# Patient Record
Sex: FEMALE | Race: WHITE | NOT HISPANIC OR LATINO | Employment: OTHER | URBAN - METROPOLITAN AREA
[De-identification: names, ages, dates, MRNs, and addresses within clinical notes are randomized per-mention and may not be internally consistent; named-entity substitution may affect disease eponyms.]

---

## 2017-05-10 RX ORDER — METOPROLOL SUCCINATE 100 MG/1
100 TABLET, EXTENDED RELEASE ORAL DAILY
Qty: 30 TABLET | Refills: 2 | Status: SHIPPED | OUTPATIENT
Start: 2017-05-10 | End: 2019-03-04

## 2019-03-04 ENCOUNTER — HOSPITAL ENCOUNTER (INPATIENT)
Facility: HOSPITAL | Age: 64
LOS: 16 days | Discharge: LEFT BEFORE DISCHARGE | End: 2019-03-20
Attending: EMERGENCY MEDICINE | Admitting: SURGERY

## 2019-03-04 ENCOUNTER — APPOINTMENT (OUTPATIENT)
Dept: MRI IMAGING | Facility: HOSPITAL | Age: 64
End: 2019-03-04

## 2019-03-04 DIAGNOSIS — D32.9 MENINGIOMA (HCC): ICD-10-CM

## 2019-03-04 DIAGNOSIS — E21.3 HYPERPARATHYROIDISM (HCC): ICD-10-CM

## 2019-03-04 DIAGNOSIS — E83.51 HYPOCALCEMIA: ICD-10-CM

## 2019-03-04 DIAGNOSIS — G93.6 CEREBRAL EDEMA (HCC): Primary | ICD-10-CM

## 2019-03-04 DIAGNOSIS — I67.1 CEREBROVASCULAR DURAL AV FISTULA: ICD-10-CM

## 2019-03-04 DIAGNOSIS — R26.2 DIFFICULTY WALKING: ICD-10-CM

## 2019-03-04 DIAGNOSIS — I77.0 AVF (ARTERIOVENOUS FISTULA) (HCC): ICD-10-CM

## 2019-03-04 DIAGNOSIS — E83.52 HYPERCALCEMIA: ICD-10-CM

## 2019-03-04 DIAGNOSIS — H49.22 LATERAL RECTUS PALSY, LEFT: ICD-10-CM

## 2019-03-04 PROBLEM — H53.2 DIPLOPIA: Status: ACTIVE | Noted: 2019-03-04

## 2019-03-04 LAB
ABO GROUP BLD: NORMAL
ANION GAP SERPL CALCULATED.3IONS-SCNC: 12.3 MMOL/L
BASOPHILS # BLD AUTO: 0.02 10*3/MM3 (ref 0–0.2)
BASOPHILS NFR BLD AUTO: 0.2 % (ref 0–1.5)
BLD GP AB SCN SERPL QL: NEGATIVE
BUN BLD-MCNC: 9 MG/DL (ref 8–23)
BUN/CREAT SERPL: 11.4 (ref 7–25)
CALCIUM SPEC-SCNC: 12.3 MG/DL (ref 8.6–10.5)
CHLORIDE SERPL-SCNC: 106 MMOL/L (ref 98–107)
CO2 SERPL-SCNC: 24.7 MMOL/L (ref 22–29)
CREAT BLD-MCNC: 0.79 MG/DL (ref 0.57–1)
CRP SERPL-MCNC: 0.06 MG/DL (ref 0–0.5)
DEPRECATED RDW RBC AUTO: 44.1 FL (ref 37–54)
EOSINOPHIL # BLD AUTO: 0.07 10*3/MM3 (ref 0–0.4)
EOSINOPHIL NFR BLD AUTO: 0.7 % (ref 0.3–6.2)
ERYTHROCYTE [DISTWIDTH] IN BLOOD BY AUTOMATED COUNT: 14 % (ref 12.3–15.4)
ERYTHROCYTE [SEDIMENTATION RATE] IN BLOOD: 41 MM/HR (ref 0–30)
GFR SERPL CREATININE-BSD FRML MDRD: 73 ML/MIN/1.73
GLUCOSE BLD-MCNC: 104 MG/DL (ref 65–99)
HCT VFR BLD AUTO: 45.9 % (ref 34–46.6)
HGB BLD-MCNC: 15.3 G/DL (ref 12–15.9)
HOLD SPECIMEN: NORMAL
HOLD SPECIMEN: NORMAL
IMM GRANULOCYTES # BLD AUTO: 0.06 10*3/MM3 (ref 0–0.05)
IMM GRANULOCYTES NFR BLD AUTO: 0.6 % (ref 0–0.5)
LYMPHOCYTES # BLD AUTO: 1.68 10*3/MM3 (ref 0.7–3.1)
LYMPHOCYTES NFR BLD AUTO: 17.4 % (ref 19.6–45.3)
MCH RBC QN AUTO: 29.2 PG (ref 26.6–33)
MCHC RBC AUTO-ENTMCNC: 33.3 G/DL (ref 31.5–35.7)
MCV RBC AUTO: 87.6 FL (ref 79–97)
MONOCYTES # BLD AUTO: 0.52 10*3/MM3 (ref 0.1–0.9)
MONOCYTES NFR BLD AUTO: 5.4 % (ref 5–12)
NEUTROPHILS # BLD AUTO: 7.28 10*3/MM3 (ref 1.4–7)
NEUTROPHILS NFR BLD AUTO: 75.7 % (ref 42.7–76)
NRBC BLD AUTO-RTO: 0 /100 WBC (ref 0–0)
PLATELET # BLD AUTO: 208 10*3/MM3 (ref 140–450)
PMV BLD AUTO: 9.8 FL (ref 6–12)
POTASSIUM BLD-SCNC: 4 MMOL/L (ref 3.5–5.2)
RBC # BLD AUTO: 5.24 10*6/MM3 (ref 3.77–5.28)
RH BLD: POSITIVE
SODIUM BLD-SCNC: 143 MMOL/L (ref 136–145)
T&S EXPIRATION DATE: NORMAL
T4 FREE SERPL-MCNC: 1.36 NG/DL (ref 0.93–1.7)
TSH SERPL DL<=0.05 MIU/L-ACNC: 1.31 MIU/ML (ref 0.27–4.2)
WBC NRBC COR # BLD: 9.63 10*3/MM3 (ref 3.4–10.8)
WHOLE BLOOD HOLD SPECIMEN: NORMAL
WHOLE BLOOD HOLD SPECIMEN: NORMAL

## 2019-03-04 PROCEDURE — 25010000002 LORAZEPAM PER 2 MG: Performed by: EMERGENCY MEDICINE

## 2019-03-04 PROCEDURE — 85025 COMPLETE CBC W/AUTO DIFF WBC: CPT | Performed by: EMERGENCY MEDICINE

## 2019-03-04 PROCEDURE — 25010000002 DEXAMETHASONE PER 1 MG: Performed by: NURSE PRACTITIONER

## 2019-03-04 PROCEDURE — 80048 BASIC METABOLIC PNL TOTAL CA: CPT | Performed by: EMERGENCY MEDICINE

## 2019-03-04 PROCEDURE — 86901 BLOOD TYPING SEROLOGIC RH(D): CPT | Performed by: EMERGENCY MEDICINE

## 2019-03-04 PROCEDURE — 25010000002 DEXAMETHASONE SODIUM PHOSPHATE 10 MG/ML SOLUTION 1 ML VIAL: Performed by: NURSE PRACTITIONER

## 2019-03-04 PROCEDURE — 70544 MR ANGIOGRAPHY HEAD W/O DYE: CPT

## 2019-03-04 PROCEDURE — 86900 BLOOD TYPING SEROLOGIC ABO: CPT | Performed by: EMERGENCY MEDICINE

## 2019-03-04 PROCEDURE — 25010000002 HYDROMORPHONE PER 4 MG: Performed by: EMERGENCY MEDICINE

## 2019-03-04 PROCEDURE — 99285 EMERGENCY DEPT VISIT HI MDM: CPT

## 2019-03-04 PROCEDURE — A9577 INJ MULTIHANCE: HCPCS | Performed by: EMERGENCY MEDICINE

## 2019-03-04 PROCEDURE — 99222 1ST HOSP IP/OBS MODERATE 55: CPT | Performed by: NURSE PRACTITIONER

## 2019-03-04 PROCEDURE — 84439 ASSAY OF FREE THYROXINE: CPT | Performed by: EMERGENCY MEDICINE

## 2019-03-04 PROCEDURE — 84443 ASSAY THYROID STIM HORMONE: CPT | Performed by: EMERGENCY MEDICINE

## 2019-03-04 PROCEDURE — 70549 MR ANGIOGRAPH NECK W/O&W/DYE: CPT

## 2019-03-04 PROCEDURE — 0 GADOBENATE DIMEGLUMINE 529 MG/ML SOLUTION: Performed by: EMERGENCY MEDICINE

## 2019-03-04 PROCEDURE — 86850 RBC ANTIBODY SCREEN: CPT | Performed by: EMERGENCY MEDICINE

## 2019-03-04 PROCEDURE — 85652 RBC SED RATE AUTOMATED: CPT | Performed by: EMERGENCY MEDICINE

## 2019-03-04 PROCEDURE — 70553 MRI BRAIN STEM W/O & W/DYE: CPT

## 2019-03-04 PROCEDURE — 86140 C-REACTIVE PROTEIN: CPT | Performed by: EMERGENCY MEDICINE

## 2019-03-04 RX ORDER — HYDROMORPHONE HYDROCHLORIDE 1 MG/ML
0.5 INJECTION, SOLUTION INTRAMUSCULAR; INTRAVENOUS; SUBCUTANEOUS
Status: DISCONTINUED | OUTPATIENT
Start: 2019-03-04 | End: 2019-03-14 | Stop reason: SDUPTHER

## 2019-03-04 RX ORDER — SODIUM CHLORIDE 0.9 % (FLUSH) 0.9 %
3 SYRINGE (ML) INJECTION EVERY 12 HOURS SCHEDULED
Status: DISCONTINUED | OUTPATIENT
Start: 2019-03-04 | End: 2019-03-20 | Stop reason: HOSPADM

## 2019-03-04 RX ORDER — DEXAMETHASONE SODIUM PHOSPHATE 4 MG/ML
4 INJECTION, SOLUTION INTRA-ARTICULAR; INTRALESIONAL; INTRAMUSCULAR; INTRAVENOUS; SOFT TISSUE EVERY 6 HOURS
Status: DISCONTINUED | OUTPATIENT
Start: 2019-03-04 | End: 2019-03-12

## 2019-03-04 RX ORDER — LORAZEPAM 2 MG/ML
1 INJECTION INTRAMUSCULAR ONCE
Status: COMPLETED | OUTPATIENT
Start: 2019-03-04 | End: 2019-03-04

## 2019-03-04 RX ORDER — HYDROMORPHONE HYDROCHLORIDE 1 MG/ML
0.5 INJECTION, SOLUTION INTRAMUSCULAR; INTRAVENOUS; SUBCUTANEOUS ONCE
Status: COMPLETED | OUTPATIENT
Start: 2019-03-04 | End: 2019-03-04

## 2019-03-04 RX ORDER — HYDROCODONE BITARTRATE AND ACETAMINOPHEN 7.5; 325 MG/1; MG/1
2 TABLET ORAL EVERY 4 HOURS PRN
Status: DISCONTINUED | OUTPATIENT
Start: 2019-03-04 | End: 2019-03-05

## 2019-03-04 RX ORDER — SODIUM CHLORIDE 0.9 % (FLUSH) 0.9 %
10 SYRINGE (ML) INJECTION AS NEEDED
Status: DISCONTINUED | OUTPATIENT
Start: 2019-03-04 | End: 2019-03-20 | Stop reason: HOSPADM

## 2019-03-04 RX ORDER — OMEPRAZOLE 20 MG/1
20 CAPSULE, DELAYED RELEASE ORAL AS NEEDED
COMMUNITY
End: 2019-03-20 | Stop reason: HOSPADM

## 2019-03-04 RX ORDER — SODIUM CHLORIDE 0.9 % (FLUSH) 0.9 %
3-10 SYRINGE (ML) INJECTION AS NEEDED
Status: DISCONTINUED | OUTPATIENT
Start: 2019-03-04 | End: 2019-03-20 | Stop reason: HOSPADM

## 2019-03-04 RX ORDER — HYDROCHLOROTHIAZIDE 25 MG/1
25 TABLET ORAL DAILY
Status: DISCONTINUED | OUTPATIENT
Start: 2019-03-04 | End: 2019-03-05

## 2019-03-04 RX ORDER — NITROGLYCERIN 0.4 MG/1
0.4 TABLET SUBLINGUAL
Status: DISCONTINUED | OUTPATIENT
Start: 2019-03-04 | End: 2019-03-20 | Stop reason: HOSPADM

## 2019-03-04 RX ORDER — FAMOTIDINE 20 MG/1
20 TABLET, FILM COATED ORAL 2 TIMES DAILY
Status: DISCONTINUED | OUTPATIENT
Start: 2019-03-04 | End: 2019-03-18

## 2019-03-04 RX ORDER — ACETAMINOPHEN 325 MG/1
650 TABLET ORAL EVERY 4 HOURS PRN
Status: DISCONTINUED | OUTPATIENT
Start: 2019-03-04 | End: 2019-03-14 | Stop reason: SDUPTHER

## 2019-03-04 RX ORDER — MELATONIN
3000 DAILY
COMMUNITY
End: 2019-03-20 | Stop reason: HOSPADM

## 2019-03-04 RX ORDER — SODIUM CHLORIDE 0.9 % (FLUSH) 0.9 %
10 SYRINGE (ML) INJECTION AS NEEDED
Status: DISCONTINUED | OUTPATIENT
Start: 2019-03-04 | End: 2019-03-04

## 2019-03-04 RX ORDER — IBUPROFEN 200 MG
400 TABLET ORAL EVERY 6 HOURS PRN
COMMUNITY
End: 2019-04-09

## 2019-03-04 RX ORDER — NALOXONE HCL 0.4 MG/ML
0.4 VIAL (ML) INJECTION
Status: DISCONTINUED | OUTPATIENT
Start: 2019-03-04 | End: 2019-03-14 | Stop reason: SDUPTHER

## 2019-03-04 RX ORDER — ONDANSETRON 2 MG/ML
4 INJECTION INTRAMUSCULAR; INTRAVENOUS EVERY 6 HOURS PRN
Status: DISCONTINUED | OUTPATIENT
Start: 2019-03-04 | End: 2019-03-20 | Stop reason: HOSPADM

## 2019-03-04 RX ADMIN — FAMOTIDINE 20 MG: 20 TABLET, FILM COATED ORAL at 20:46

## 2019-03-04 RX ADMIN — LORAZEPAM 1 MG: 2 INJECTION INTRAMUSCULAR; INTRAVENOUS at 13:29

## 2019-03-04 RX ADMIN — DEXAMETHASONE SODIUM PHOSPHATE 10 MG: 10 INJECTION INTRAMUSCULAR; INTRAVENOUS at 17:29

## 2019-03-04 RX ADMIN — HYDROMORPHONE HYDROCHLORIDE 0.5 MG: 1 INJECTION, SOLUTION INTRAMUSCULAR; INTRAVENOUS; SUBCUTANEOUS at 16:48

## 2019-03-04 RX ADMIN — SODIUM CHLORIDE, PRESERVATIVE FREE 3 ML: 5 INJECTION INTRAVENOUS at 23:19

## 2019-03-04 RX ADMIN — DEXAMETHASONE SODIUM PHOSPHATE 4 MG: 4 INJECTION, SOLUTION INTRAMUSCULAR; INTRAVENOUS at 23:39

## 2019-03-04 RX ADMIN — HYDROCHLOROTHIAZIDE 25 MG: 25 TABLET ORAL at 13:20

## 2019-03-04 RX ADMIN — GADOBENATE DIMEGLUMINE 20 ML: 529 INJECTION, SOLUTION INTRAVENOUS at 14:39

## 2019-03-04 NOTE — ED NOTES
"Pt complains of \"I started having double vision about a week or so ago, and I thought it would get better, but then on Saturday I noticed when I was looking up it was really painful behind my eyes.\"      Kerrie Branch, RN  03/04/19 0599    "

## 2019-03-04 NOTE — ED PROVIDER NOTES
EMERGENCY DEPARTMENT ENCOUNTER    Room Number:  38/38  Date seen:  3/4/2019  Time seen: 12:06 PM  PCP: Juarez Winters MD  Historian: Pt      HPI:  Chief Complaint: Diplopia  A complete HPI/ROS/PMH/PSH/SH/FH are unobtainable due to: Nothing  Context: Joan Almanza is a 64 y.o. female who presents to the ED c/o diplopia when both of her eyes open, which began last night. The pt states that symptoms began in her L eye, and it is easier to see objects up close. The pt mild headache 2 days ago and denies fever, neck pain. The pt states that the pt had some vision changes last year (saw a line in her vision), but the symptoms resolved on their own. The pt has not seen her PCP in 3 years. The pt states that her BP is normally 130s.     Pain Location: Bilateral eyes  Radiation: None  Quality: Double vision  Intensity/Severity: Moderate  Duration: 1 day  Onset quality: Gradual  Timing: Constant  Progression: No change  Aggravating Factors: Unknown  Alleviating Factors: Unknown  Previous Episodes: The pt saw a line in her vision 1 year ago and the symptoms resolved on their own.   Treatment before arrival: None  Associated Symptoms: Headache    PAST MEDICAL HISTORY  Active Ambulatory Problems     Diagnosis Date Noted   • No Active Ambulatory Problems     Resolved Ambulatory Problems     Diagnosis Date Noted   • No Resolved Ambulatory Problems     Past Medical History:   Diagnosis Date   • Tachycardia          PAST SURGICAL HISTORY  Past Surgical History:   Procedure Laterality Date   • ADENOIDECTOMY     • CHOLECYSTECTOMY     • OVARY SURGERY Right    • TONSILLECTOMY           FAMILY HISTORY  History reviewed. No pertinent family history.      SOCIAL HISTORY  Social History     Socioeconomic History   • Marital status:      Spouse name: Not on file   • Number of children: Not on file   • Years of education: Not on file   • Highest education level: Not on file   Social Needs   • Financial resource strain: Not on  file   • Food insecurity - worry: Not on file   • Food insecurity - inability: Not on file   • Transportation needs - medical: Not on file   • Transportation needs - non-medical: Not on file   Occupational History   • Not on file   Tobacco Use   • Smoking status: Never Smoker   Substance and Sexual Activity   • Alcohol use: No     Frequency: Never   • Drug use: No   • Sexual activity: Defer   Other Topics Concern   • Not on file   Social History Narrative   • Not on file         ALLERGIES  Patient has no known allergies.        REVIEW OF SYSTEMS  Review of Systems   Constitutional: Negative for fever.   HENT: Negative for sore throat.    Eyes: Positive for visual disturbance (Diplopia).   Respiratory: Negative for shortness of breath.    Cardiovascular: Negative for chest pain.   Gastrointestinal: Negative for abdominal pain.   Endocrine: Negative for polyuria.   Genitourinary: Negative for dysuria.   Musculoskeletal: Negative for neck pain.   Skin: Negative for rash.   Neurological: Positive for headaches.   All other systems reviewed and are negative.           PHYSICAL EXAM  ED Triage Vitals   Temp Heart Rate Resp BP SpO2   03/04/19 1050 03/04/19 1050 03/04/19 1100 03/04/19 1050 03/04/19 1050   98.4 °F (36.9 °C) 106 16 (!) 194/115 98 %      Temp src Heart Rate Source Patient Position BP Location FiO2 (%)   03/04/19 1050 03/04/19 1050 03/04/19 1151 -- --   Tympanic Monitor Lying           GENERAL: not distressed  HENT: nares patent  EYES: no scleral icterus, PERRL, pupils 5mm, lateral rectus palsy in L eye, L eye 20/20 -1, 20/20 in R eye  CV: regular rhythm, regular rate  RESPIRATORY: normal effort  MUSCULOSKELETAL: no deformity  NEURO: alert, HERNANDEZ, FC  SKIN: warm, dry    Vital signs and nursing notes reviewed.          LAB RESULTS  Recent Results (from the past 24 hour(s))   Light Blue Top    Collection Time: 03/04/19 11:51 AM   Result Value Ref Range    Extra Tube hold for add-on    Green Top (Gel)    Collection  Time: 03/04/19 11:51 AM   Result Value Ref Range    Extra Tube Hold for add-ons.    Lavender Top    Collection Time: 03/04/19 11:51 AM   Result Value Ref Range    Extra Tube hold for add-on    Gold Top - SST    Collection Time: 03/04/19 11:51 AM   Result Value Ref Range    Extra Tube Hold for add-ons.    Basic Metabolic Panel    Collection Time: 03/04/19 11:51 AM   Result Value Ref Range    Glucose 104 (H) 65 - 99 mg/dL    BUN 9 8 - 23 mg/dL    Creatinine 0.79 0.57 - 1.00 mg/dL    Sodium 143 136 - 145 mmol/L    Potassium 4.0 3.5 - 5.2 mmol/L    Chloride 106 98 - 107 mmol/L    CO2 24.7 22.0 - 29.0 mmol/L    Calcium 12.3 (H) 8.6 - 10.5 mg/dL    eGFR Non African Amer 73 >60 mL/min/1.73    BUN/Creatinine Ratio 11.4 7.0 - 25.0    Anion Gap 12.3 mmol/L   Sedimentation Rate    Collection Time: 03/04/19 11:51 AM   Result Value Ref Range    Sed Rate 41 (H) 0 - 30 mm/hr   C-reactive Protein    Collection Time: 03/04/19 11:51 AM   Result Value Ref Range    C-Reactive Protein 0.06 0.00 - 0.50 mg/dL   TSH    Collection Time: 03/04/19 11:51 AM   Result Value Ref Range    TSH 1.310 0.270 - 4.200 mIU/mL   T4, Free    Collection Time: 03/04/19 11:51 AM   Result Value Ref Range    Free T4 1.36 0.93 - 1.70 ng/dL   CBC Auto Differential    Collection Time: 03/04/19 11:51 AM   Result Value Ref Range    WBC 9.63 3.40 - 10.80 10*3/mm3    RBC 5.24 3.77 - 5.28 10*6/mm3    Hemoglobin 15.3 12.0 - 15.9 g/dL    Hematocrit 45.9 34.0 - 46.6 %    MCV 87.6 79.0 - 97.0 fL    MCH 29.2 26.6 - 33.0 pg    MCHC 33.3 31.5 - 35.7 g/dL    RDW 14.0 12.3 - 15.4 %    RDW-SD 44.1 37.0 - 54.0 fl    MPV 9.8 6.0 - 12.0 fL    Platelets 208 140 - 450 10*3/mm3    Neutrophil % 75.7 42.7 - 76.0 %    Lymphocyte % 17.4 (L) 19.6 - 45.3 %    Monocyte % 5.4 5.0 - 12.0 %    Eosinophil % 0.7 0.3 - 6.2 %    Basophil % 0.2 0.0 - 1.5 %    Immature Grans % 0.6 (H) 0.0 - 0.5 %    Neutrophils, Absolute 7.28 (H) 1.40 - 7.00 10*3/mm3    Lymphocytes, Absolute 1.68 0.70 - 3.10 10*3/mm3     Monocytes, Absolute 0.52 0.10 - 0.90 10*3/mm3    Eosinophils, Absolute 0.07 0.00 - 0.40 10*3/mm3    Basophils, Absolute 0.02 0.00 - 0.20 10*3/mm3    Immature Grans, Absolute 0.06 (H) 0.00 - 0.05 10*3/mm3    nRBC 0.0 0.0 - 0.0 /100 WBC       Ordered the above labs and reviewed the results.        RADIOLOGY  MRI Angiogram Head Without Contrast   Preliminary Result       There are findings most compatible with a meningioma along the medial   aspect of the left middle cranial fossa which potentially invades the   adjacent cavernous sinus measuring up to approximately 2.2 x 1.6 x 2.8   cm in greatest dimensions. There is some vasogenic edema within the   anterior left temporal lobe. Additionally, there are findings compatible   with a second, much smaller meningioma along the superior aspect of the   right superior parietal lobule measuring up to 6 mm in diameter that   does not result in any significant adjacent mass effect.       There are findings most compatible with a dural AV fistula along the   lateral aspect of the right temporal occipital junction with prominent   venous vasculature seen along the lateral aspect of the right temporal   and occipital lobes.       MRA of the head was obtained with 3D time-of-flight imaging technique.   Maximum intensity projection reconstructed images were obtained.        FINDINGS:         There are findings most compatible with a dural AV fistula along the   lateral aspect of the right temporal occipital junction. There is a   hypertrophied posterior division of the right middle meningeal artery.   Along its distal margins, there is a tuft of abnormal vasculature with   findings consistent with arterialized cortical draining veins. I suspect   that the fistula drains directly into a cortical vein. Some of these   veins are dilated and ultimately, the findings are suggestive of at   least a type III is not a type IV Djindjian-Merland classification   scheme fistula.   Additionally, there is some diminished flow related   enhancement within the proximal aspect of the posterior division of the   right middle meningeal artery along with potentially some diminished   flow related enhancement along the distal margins of the middle   meningeal artery potentially representing flow-related stenoses.       The right internal carotid artery, both middle cerebral arteries, both   anterior cerebral arteries, the vertebral arteries, the basilar artery,   and the posterior cerebral arteries are all unremarkable in appearance.   There is some atherosclerotic irregularity within the cavernous segment   of the left ICA.        IMPRESSION:       There are findings most compatible with at least a type III if not a   type IV Djindjian-Merland classification scheme dural AV fistula. Again,   the arterial supply appears to be predominantly via posterior division   of the right middle meningeal artery and this fistula appears to be   directly into a cortical vein with arterialized draining cortical veins   noted along the lateral aspect of the right temporal and occipital   lobes. Furthermore, some of these veins appear dilated suggesting varix   formation. Again, there may be a flow related stenosis within the   proximal and distal aspect of the feeding posterior division of right   middle meningeal artery.       Otherwise, mild atherosclerotic irregularity is noted within the left   cavernous ICA.       MRA of the neck was obtained with 3D time-of-flight imaging technique.   Additionally, there is a contrast enhanced MRA of the neck.       FINDINGS:        There is a classic configuration of the aortic arch. There is no NASCET   stenosis within either common carotid artery bifurcation or proximal   internal carotid. The vertebral arteries are unremarkable. The left   vertebral artery is dominant. Incidental note is made of prominent flow   within the right sigmoid sinus and internal jugular vein from  the   patient's apparent dural AV fistula.       IMPRESSION:        No significant NASCET stenosis within either common carotid artery   bifurcation or proximal internal carotid. Prominent flow is noted within   the right sigmoid sinus and internal jugular vein from the patient's   apparent dural AV fistula.       These findings were discussed with Bull Branch MD on 03/04/2019.           MRI Angiogram Neck With & Without Contrast   Preliminary Result       There are findings most compatible with a meningioma along the medial   aspect of the left middle cranial fossa which potentially invades the   adjacent cavernous sinus measuring up to approximately 2.2 x 1.6 x 2.8   cm in greatest dimensions. There is some vasogenic edema within the   anterior left temporal lobe. Additionally, there are findings compatible   with a second, much smaller meningioma along the superior aspect of the   right superior parietal lobule measuring up to 6 mm in diameter that   does not result in any significant adjacent mass effect.       There are findings most compatible with a dural AV fistula along the   lateral aspect of the right temporal occipital junction with prominent   venous vasculature seen along the lateral aspect of the right temporal   and occipital lobes.       MRA of the head was obtained with 3D time-of-flight imaging technique.   Maximum intensity projection reconstructed images were obtained.        FINDINGS:         There are findings most compatible with a dural AV fistula along the   lateral aspect of the right temporal occipital junction. There is a   hypertrophied posterior division of the right middle meningeal artery.   Along its distal margins, there is a tuft of abnormal vasculature with   findings consistent with arterialized cortical draining veins. I suspect   that the fistula drains directly into a cortical vein. Some of these   veins are dilated and ultimately, the findings are suggestive of at   least  a type III is not a type IV Djindjian-Merland classification   scheme fistula.  Additionally, there is some diminished flow related   enhancement within the proximal aspect of the posterior division of the   right middle meningeal artery along with potentially some diminished   flow related enhancement along the distal margins of the middle   meningeal artery potentially representing flow-related stenoses.       The right internal carotid artery, both middle cerebral arteries, both   anterior cerebral arteries, the vertebral arteries, the basilar artery,   and the posterior cerebral arteries are all unremarkable in appearance.   There is some atherosclerotic irregularity within the cavernous segment   of the left ICA.        IMPRESSION:       There are findings most compatible with at least a type III if not a   type IV Djindjian-Merland classification scheme dural AV fistula. Again,   the arterial supply appears to be predominantly via posterior division   of the right middle meningeal artery and this fistula appears to be   directly into a cortical vein with arterialized draining cortical veins   noted along the lateral aspect of the right temporal and occipital   lobes. Furthermore, some of these veins appear dilated suggesting varix   formation. Again, there may be a flow related stenosis within the   proximal and distal aspect of the feeding posterior division of right   middle meningeal artery.       Otherwise, mild atherosclerotic irregularity is noted within the left   cavernous ICA.       MRA of the neck was obtained with 3D time-of-flight imaging technique.   Additionally, there is a contrast enhanced MRA of the neck.       FINDINGS:        There is a classic configuration of the aortic arch. There is no NASCET   stenosis within either common carotid artery bifurcation or proximal   internal carotid. The vertebral arteries are unremarkable. The left   vertebral artery is dominant. Incidental note is made  of prominent flow   within the right sigmoid sinus and internal jugular vein from the   patient's apparent dural AV fistula.       IMPRESSION:        No significant NASCET stenosis within either common carotid artery   bifurcation or proximal internal carotid. Prominent flow is noted within   the right sigmoid sinus and internal jugular vein from the patient's   apparent dural AV fistula.       These findings were discussed with Bull Branch MD on 03/04/2019.           MRI Brain With & Without Contrast   Preliminary Result       There are findings most compatible with a meningioma along the medial   aspect of the left middle cranial fossa which potentially invades the   adjacent cavernous sinus measuring up to approximately 2.2 x 1.6 x 2.8   cm in greatest dimensions. There is some vasogenic edema within the   anterior left temporal lobe. Additionally, there are findings compatible   with a second, much smaller meningioma along the superior aspect of the   right superior parietal lobule measuring up to 6 mm in diameter that   does not result in any significant adjacent mass effect.       There are findings most compatible with a dural AV fistula along the   lateral aspect of the right temporal occipital junction with prominent   venous vasculature seen along the lateral aspect of the right temporal   and occipital lobes.       MRA of the head was obtained with 3D time-of-flight imaging technique.   Maximum intensity projection reconstructed images were obtained.        FINDINGS:         There are findings most compatible with a dural AV fistula along the   lateral aspect of the right temporal occipital junction. There is a   hypertrophied posterior division of the right middle meningeal artery.   Along its distal margins, there is a tuft of abnormal vasculature with   findings consistent with arterialized cortical draining veins. I suspect   that the fistula drains directly into a cortical vein. Some of these    veins are dilated and ultimately, the findings are suggestive of at   least a type III is not a type IV Djindjian-Merland classification   scheme fistula.  Additionally, there is some diminished flow related   enhancement within the proximal aspect of the posterior division of the   right middle meningeal artery along with potentially some diminished   flow related enhancement along the distal margins of the middle   meningeal artery potentially representing flow-related stenoses.       The right internal carotid artery, both middle cerebral arteries, both   anterior cerebral arteries, the vertebral arteries, the basilar artery,   and the posterior cerebral arteries are all unremarkable in appearance.   There is some atherosclerotic irregularity within the cavernous segment   of the left ICA.        IMPRESSION:       There are findings most compatible with at least a type III if not a   type IV Djindjian-Merland classification scheme dural AV fistula. Again,   the arterial supply appears to be predominantly via posterior division   of the right middle meningeal artery and this fistula appears to be   directly into a cortical vein with arterialized draining cortical veins   noted along the lateral aspect of the right temporal and occipital   lobes. Furthermore, some of these veins appear dilated suggesting varix   formation. Again, there may be a flow related stenosis within the   proximal and distal aspect of the feeding posterior division of right   middle meningeal artery.       Otherwise, mild atherosclerotic irregularity is noted within the left   cavernous ICA.       MRA of the neck was obtained with 3D time-of-flight imaging technique.   Additionally, there is a contrast enhanced MRA of the neck.       FINDINGS:        There is a classic configuration of the aortic arch. There is no NASCET   stenosis within either common carotid artery bifurcation or proximal   internal carotid. The vertebral arteries are  unremarkable. The left   vertebral artery is dominant. Incidental note is made of prominent flow   within the right sigmoid sinus and internal jugular vein from the   patient's apparent dural AV fistula.       IMPRESSION:        No significant NASCET stenosis within either common carotid artery   bifurcation or proximal internal carotid. Prominent flow is noted within   the right sigmoid sinus and internal jugular vein from the patient's   apparent dural AV fistula.       These findings were discussed with Bull Branch MD on 03/04/2019.                  Ordered the above noted radiological studies. Reviewed by me in PACS.  Spoke with Dr. Baker (radiologist) regarding MRI scan results.          PROCEDURES  Procedures        MEDICATIONS GIVEN IN ER  Medications   sodium chloride 0.9 % flush 10 mL (not administered)   hydrochlorothiazide (HYDRODIURIL) tablet 25 mg (25 mg Oral Given 3/4/19 1320)   dexamethasone (DECADRON) 10 mg/20ml NS IV syringe (not administered)   dexamethasone (DECADRON) injection 4 mg (not administered)   LORazepam (ATIVAN) injection 1 mg (1 mg Intravenous Given 3/4/19 1329)   gadobenate dimeglumine (MULTIHANCE) injection 20 mL (20 mL Intravenous Given 3/4/19 1439)   HYDROmorphone (DILAUDID) injection 0.5 mg (0.5 mg Intravenous Given 3/4/19 1648)           PROGRESS AND CONSULTS  ED Course as of Mar 04 1727   Mon Mar 04, 2019   1233 Differential for her diplopia includes cranial nerve palsy such as intracranial aneurysm, stroke, giant cell arteritis, migraine.  Also included includes myasthenia gravis, Warnicke's, Alvarado Adrian variant, pseudotumor cerebri, thyroid ophthalmopathy.  [TD]      ED Course User Index  [TD] Bull Branch II, MD     1215 Discussed the plan to order an MRI due to the pt's vision changes. The pt states that she is claustrophobic, and I told her that I would order her medication for her symptoms. The pt agrees with the plan.     1234 Ordered sed rate, CRP, TSH, T4,  BMP, CBC, MRI angiogram neck, MRI brain, and MRI angiogram head for further evaluation. Ordered hydrodiuril for BP.     1235 Ordered ativan for claustrophobia.     1612 Placed call to neurosurgery.     1632 Discussed the pt with APRN with Dr. Bhakta (neuro) who will see the pt in the hospital.     1636 Rechecked the pt. The pt states that she has a headache, and states that she hears a heartbeat in her R ear. The pt has heard the sounds in her ear for months.  Discussed the pt's MRI and MRA results. Discussed that I will have neurosurgery see the pt to decide a further plan.     1707 Placed call to [facility] for admission.    1720 Discussed the pt with Dr. Simmons ([facility]) who agrees to admit the pt.     1720 Ordered inpatient admission.     MEDICAL DECISION MAKING      MDM  Number of Diagnoses or Management Options  Cerebrovascular dural AV fistula:   Lateral rectus palsy, left:   Meningioma (CMS/HCC):      Amount and/or Complexity of Data Reviewed  Clinical lab tests: ordered and reviewed (Sed rate: 41)  Tests in the radiology section of CPT®: ordered and reviewed (MRI/MRA: Meningioma, high grade dural AV fistula)  Discussion of test results with the performing providers: yes (Dr. Baker (radiology))  Decide to obtain previous medical records or to obtain history from someone other than the patient: yes  Review and summarize past medical records: yes  Discuss the patient with other providers: yes (Dr. Simmons ([facility]), APRN with Dr. Bhakta (neuro))    Patient Progress  Patient progress: stable             DIAGNOSIS  Final diagnoses:   Meningioma (CMS/HCC)   Lateral rectus palsy, left   Cerebrovascular dural AV fistula         DISPOSITION  ADMISSION    Discussed treatment plan and reason for admission with pt/family and admitting physician.  Pt/family voiced understanding of the plan for admission for further testing/treatment as needed.       Latest Documented Vital Signs:  As of 5:27 PM  BP- 168/100 HR- 58 Temp- 98.4 °F (36.9 °C)  (Tympanic) O2 sat- 97%        --  Documentation assistance provided by paul Siddiqui for Dr. Jose Carlos MD.  Information recorded by the scribe was done at my direction and has been verified and validated by me.     Nicki Siddiqui  03/04/19 2523       Bull Branch II, MD  03/04/19 3327

## 2019-03-04 NOTE — PROGRESS NOTES
Clinical Pharmacy Services: Medication History    Joan Almanza is a 64 y.o. female presenting to [facility] for   Chief Complaint   Patient presents with   • Headache   • Eye Pain   • Diplopia       She  has a past medical history of Morbid obesity (CMS/HCC), Ovarian cyst, and Tachycardia.    Allergies as of 03/04/2019   • (No Known Allergies)       Medication information was obtained from: Patient  Pharmacy and Phone Number: [facility] [phone]    Prior to Admission Medications     Prescriptions Last Dose Informant Patient Reported? Taking?    cholecalciferol (VITAMIN D3) 1000 units tablet  Self Yes Yes    Take 3,000 Units by mouth Daily.    ibuprofen (ADVIL,MOTRIN) 200 MG tablet  Self Yes Yes    Take 400 mg by mouth Every 6 (Six) Hours As Needed for Mild Pain .    omeprazole (priLOSEC) 20 MG capsule  Self Yes Yes    Take 20 mg by mouth As Needed.            Medication notes: Toprol removed. Prilosec, Ibuprofen, and Vitamin D added per patient.    This medication list is complete to the best of my knowledge as of 3/4/2019    Please call if questions.    Ivis Davis, Medication History Technician  3/4/2019 5:59 PM

## 2019-03-04 NOTE — PLAN OF CARE
Problem: Patient Care Overview  Goal: Plan of Care Review  Outcome: Ongoing (interventions implemented as appropriate)   03/04/19 8009   Coping/Psychosocial   Plan of Care Reviewed With patient   OTHER   Outcome Summary New admission     Goal: Individualization and Mutuality  Outcome: Ongoing (interventions implemented as appropriate)

## 2019-03-04 NOTE — CONSULTS
Patient name: Joan Almanza  Referring Provider: Dr. Branch  Reason for Consultation: AVF, meningioma    Patient Care Team:  Juarez Winters MD as PCP - General (Family Medicine)    Chief complaint: double vision    Subjective .     History of present illness:    Patient is a 64 y.o. right handed female who presents with 1 week of diplopia, horizontal and apparent in distant vision. It was initially mild but progressively more obvious and now constant. It is associated with frontal pressure HA that is worse with looking up. She also report pulsation sound in right ear that is most notable in the mornings and worsens with valsalva. She has intermittent episodes of light headedness. No swallow or speech issues. Left knee gave away once but no falls or other weakness, numbness, tingling. She report slow purposeful weight loss of 130 pounds over last 3 years.     Review of Systems  Review of Systems   Constitutional: Positive for appetite change (diminished). Negative for fever and unexpected weight change.   HENT: Negative for trouble swallowing.    Eyes: Positive for pain (with looking up) and visual disturbance. Negative for photophobia.   Respiratory: Negative for shortness of breath.    Cardiovascular: Negative for chest pain.   Gastrointestinal: Negative for nausea and vomiting.   Musculoskeletal: Negative for gait problem and neck pain.   Neurological: Positive for dizziness and headaches. Negative for facial asymmetry, speech difficulty, weakness and numbness.   Psychiatric/Behavioral: Negative for confusion.        Some  Word finding difficulties       History  PAST MEDICAL HISTORY  Past Medical History:   Diagnosis Date   • Morbid obesity (CMS/HCC)    • Ovarian cyst    • Tachycardia        PAST SURGICAL HISTORY  Past Surgical History:   Procedure Laterality Date   • ADENOIDECTOMY     • CHOLECYSTECTOMY     • OVARY SURGERY Right    • TONSILLECTOMY         FAMILY HISTORY  History reviewed. No pertinent family  history.    SOCIAL HISTORY  Social History     Tobacco Use   • Smoking status: Never Smoker   Substance Use Topics   • Alcohol use: No     Frequency: Never   • Drug use: No       unemployed  Lives with     Allergies:  Patient has no known allergies.    MEDICATIONS:    Current Facility-Administered Medications:   •  dexamethasone (DECADRON) injection 4 mg, 4 mg, Intravenous, Q6H, Elisabet Skinner APRN  •  famotidine (PEPCID) tablet 20 mg, 20 mg, Oral, BID, Elisabet Skinner APRN  •  hydrochlorothiazide (HYDRODIURIL) tablet 25 mg, 25 mg, Oral, Daily, Bull Branch II, MD, 25 mg at 03/04/19 1320  •  [COMPLETED] Insert peripheral IV, , , Once **AND** sodium chloride 0.9 % flush 10 mL, 10 mL, Intravenous, PRN, Bull Branch II, MD    Current Outpatient Medications:   •  metoprolol succinate XL (TOPROL-XL) 100 MG 24 hr tablet, Take 1 tablet by mouth Daily., Disp: 30 tablet, Rfl: 2      Objective     Results Review:  LABS:  Results from last 7 days   Lab Units 03/04/19  1151   WBC 10*3/mm3 9.63   HEMOGLOBIN g/dL 15.3   HEMATOCRIT % 45.9   PLATELETS 10*3/mm3 208     Results from last 7 days   Lab Units 03/04/19  1151   SODIUM mmol/L 143   POTASSIUM mmol/L 4.0   CHLORIDE mmol/L 106   CO2 mmol/L 24.7   BUN mg/dL 9   CREATININE mg/dL 0.79   CALCIUM mg/dL 12.3*   GLUCOSE mg/dL 104*       DIAGNOSTICS:  MRI/MRA- right temporal/occipital AVF with draining veins; left medial cranial fossa briskly enhancing mass with invasion of cavernous sinus; there is mild edema; unable to appreciate small right parietal mass    Results Review:   I reviewed the patient's new clinical results.  I personally viewed and interpreted the patient's MRI brain/MRA head- discuss with and reviewed by Dr. Bhakta    Vital Signs   Temp:  [98.4 °F (36.9 °C)] 98.4 °F (36.9 °C)  Heart Rate:  [] 58  Resp:  [16-18] 18  BP: (159-194)/(100-115) 168/100    Physical Exam:  Physical Exam   Constitutional: She is oriented to person,  place, and time. She appears well-developed.   Morbidly obese  Body mass index is 49.38 kg/m².     HENT:   Right postauricular bruit is soft but heard on auscultation   Eyes: EOM are normal. Pupils are equal, round, and reactive to light.   Neck: Neck supple. Normal carotid pulses present. Carotid bruit is not present.   Cardiovascular: Normal rate and regular rhythm.  Occasional extrasystoles are present.   Pulses:       Carotid pulses are 2+ on the right side, and 2+ on the left side.       Radial pulses are 2+ on the right side, and 2+ on the left side.        Dorsalis pedis pulses are 1+ on the right side, and 2+ on the left side.   Pulmonary/Chest: Effort normal and breath sounds normal.   Abdominal: Soft. Bowel sounds are normal. She exhibits no distension. There is no hepatosplenomegaly. There is no tenderness.   Musculoskeletal:        Cervical back: She exhibits normal range of motion.   Neurological: She is alert and oriented to person, place, and time. She has an abnormal Heel to Shin Test (left). She has a normal Finger-Nose-Finger Test.   Skin: Skin is warm and dry. Capillary refill takes less than 2 seconds.   Psychiatric: She has a normal mood and affect. Her speech is normal and behavior is normal. Judgment normal.     Neurologic Exam     Mental Status   Oriented to person, place, and time.   Registration: recalls 3 of 3 objects. Follows 3 step commands.   Attention: normal. Concentration: normal.   Speech: speech is normal   Level of consciousness: alert  Knowledge: good.   Able to name object. Able to repeat. Normal comprehension.     Cranial Nerves     CN II   Visual fields full to confrontation.     CN III, IV, VI   Pupils are equal, round, and reactive to light.  Extraocular motions are normal.   CN III: no CN III palsy  CN VI: no CN VI palsy  Nystagmus: none   Diplopia: horizontal and bilateral  Ophthalmoparesis: none  Conjugate gaze: present    CN V   Facial sensation intact.     CN VII    Facial expression full, symmetric.     CN VIII   CN VIII normal.     CN IX, X   CN IX normal.   CN X normal.     CN XI   CN XI normal.     CN XII   CN XII normal.     Motor Exam   Right arm pronator drift: absent  Left arm pronator drift: present (slight with LEG not arm)    Sensory Exam   Light touch normal.     Gait, Coordination, and Reflexes     Gait  Gait: (not tested for safety)    Coordination   Finger to nose coordination: normal  Heel to shin coordination: abnormal (left)    Reflexes   Right plantar: normal  Left plantar: normal  Right Stearns: absent  Left Stearns: absent      Assessment/Plan       Cerebral edema (CMS/HCC)    Meningioma (CMS/HCC)    AVF (arteriovenous fistula) (CMS/[facility])      PLAN: Patient presents secondary to diplopia.  Imaging findings reveal a left medial fossa lesion with invasion into the cavernous sinus.  This is likely the source of the diplopia.  She was also found to have an incidental right temporal occipital AV fistula.  No evidence of hemorrhage.  After further investigation she does admit to pulsatile tinnitus over the last several months that is aggravated by Valsalva.    Her exam is as noted above.  She reports diplopia, visual fields and other cranial nerves are intact otherwise.  She has a mild drift of the left lower extremity and ataxia on heel to shin, but upper extremity normal.  At this time, we will observe her overnight to see if some steroid will improve her visual complaints.  I have also added GI prophylaxis.  Most important factor is blood pressure control.  Recommend systolic pressure to be normotensive, but definitely less than 150.  We will leave this to the admitting physician.  Dr. Bhakta to follow-up and discuss potential treatment options with the patient tomorrow morning.  Patient will likely need to be discharged on prednisone due to cost prohibitive nature of dexamethasone without insurance.    I discussed the patients findings and my recommendations  with patient and family    Elisabet Skinner, APRN  03/04/19  5:41 PM    Dragon disclaimer:   Much of this encounter note is an electronic transcription/translation of spoken language to printed text. The electronic translation of spoken language may permit erroneous, or at times, nonsensical words or phrases to be inadvertently transcribed; Although I have reviewed the note for such errors, some may still exist.

## 2019-03-04 NOTE — PROGRESS NOTES
"Entered room and introduced self to pt/explained role. Verified information on facesheet is correct. Asked pt if she currently had a PCP. Pt responded that she does have a PCP, but hasn't been to see him in a while \"beause I lost my job and my insurance. I currently see a clinic for primary care. But now I am only 86 days away for qualifying for medicare, so I will return to Dr. Winters when I get insurance again.\" Pt provided a clinic list, so that she may find a clinic more convenient to her address. Pt thanked CCP and asked that someone might come and assist her with the potential for applying for medicaid. [facility], who stated she would see the pt once a dispostion was set on pt's chart.   "

## 2019-03-04 NOTE — ED NOTES
MRI tech called and screening sheet gone over. MRI tech stated they will send for her now.     Rafaela Landin, RN  03/04/19 7051

## 2019-03-05 ENCOUNTER — TELEPHONE (OUTPATIENT)
Dept: NEUROSURGERY | Facility: CLINIC | Age: 64
End: 2019-03-05

## 2019-03-05 LAB
25(OH)D3 SERPL-MCNC: 36.2 NG/ML (ref 30–100)
ALBUMIN SERPL-MCNC: 4.1 G/DL (ref 3.5–5.2)
ALBUMIN/GLOB SERPL: 1.1 G/DL
ALP SERPL-CCNC: 114 U/L (ref 39–117)
ALT SERPL W P-5'-P-CCNC: 12 U/L (ref 1–33)
ANION GAP SERPL CALCULATED.3IONS-SCNC: 11.3 MMOL/L
AST SERPL-CCNC: 11 U/L (ref 1–32)
BASOPHILS # BLD AUTO: 0.01 10*3/MM3 (ref 0–0.2)
BASOPHILS NFR BLD AUTO: 0.1 % (ref 0–1.5)
BILIRUB SERPL-MCNC: 0.6 MG/DL (ref 0.1–1.2)
BUN BLD-MCNC: 10 MG/DL (ref 8–23)
BUN/CREAT SERPL: 12 (ref 7–25)
CALCIUM SPEC-SCNC: 13 MG/DL (ref 8.6–10.5)
CHLORIDE SERPL-SCNC: 103 MMOL/L (ref 98–107)
CO2 SERPL-SCNC: 24.7 MMOL/L (ref 22–29)
CREAT BLD-MCNC: 0.83 MG/DL (ref 0.57–1)
DEPRECATED RDW RBC AUTO: 42.4 FL (ref 37–54)
EOSINOPHIL # BLD AUTO: 0 10*3/MM3 (ref 0–0.4)
EOSINOPHIL NFR BLD AUTO: 0 % (ref 0.3–6.2)
ERYTHROCYTE [DISTWIDTH] IN BLOOD BY AUTOMATED COUNT: 13.6 % (ref 12.3–15.4)
GFR SERPL CREATININE-BSD FRML MDRD: 69 ML/MIN/1.73
GLOBULIN UR ELPH-MCNC: 3.8 GM/DL
GLUCOSE BLD-MCNC: 169 MG/DL (ref 65–99)
HBA1C MFR BLD: 5 % (ref 4.8–5.6)
HCT VFR BLD AUTO: 44.9 % (ref 34–46.6)
HGB BLD-MCNC: 15.2 G/DL (ref 12–15.9)
IMM GRANULOCYTES # BLD AUTO: 0.13 10*3/MM3 (ref 0–0.05)
IMM GRANULOCYTES NFR BLD AUTO: 1.4 % (ref 0–0.5)
INR PPP: 1.04 (ref 0.9–1.1)
LYMPHOCYTES # BLD AUTO: 0.69 10*3/MM3 (ref 0.7–3.1)
LYMPHOCYTES NFR BLD AUTO: 7.3 % (ref 19.6–45.3)
MAGNESIUM SERPL-MCNC: 1.8 MG/DL (ref 1.6–2.4)
MCH RBC QN AUTO: 29.3 PG (ref 26.6–33)
MCHC RBC AUTO-ENTMCNC: 33.9 G/DL (ref 31.5–35.7)
MCV RBC AUTO: 86.5 FL (ref 79–97)
MONOCYTES # BLD AUTO: 0.18 10*3/MM3 (ref 0.1–0.9)
MONOCYTES NFR BLD AUTO: 1.9 % (ref 5–12)
NEUTROPHILS # BLD AUTO: 8.39 10*3/MM3 (ref 1.4–7)
NEUTROPHILS NFR BLD AUTO: 89.3 % (ref 42.7–76)
NRBC BLD AUTO-RTO: 0 /100 WBC (ref 0–0)
PLATELET # BLD AUTO: 228 10*3/MM3 (ref 140–450)
PMV BLD AUTO: 9.9 FL (ref 6–12)
POTASSIUM BLD-SCNC: 3.9 MMOL/L (ref 3.5–5.2)
PROT SERPL-MCNC: 7.9 G/DL (ref 6–8.5)
PROTHROMBIN TIME: 13.4 SECONDS (ref 11.7–14.2)
PTH-INTACT SERPL-MCNC: 172 PG/ML (ref 15–65)
RBC # BLD AUTO: 5.19 10*6/MM3 (ref 3.77–5.28)
SODIUM BLD-SCNC: 139 MMOL/L (ref 136–145)
WBC NRBC COR # BLD: 9.4 10*3/MM3 (ref 3.4–10.8)

## 2019-03-05 PROCEDURE — 82306 VITAMIN D 25 HYDROXY: CPT | Performed by: INTERNAL MEDICINE

## 2019-03-05 PROCEDURE — 82397 CHEMILUMINESCENT ASSAY: CPT | Performed by: INTERNAL MEDICINE

## 2019-03-05 PROCEDURE — 86334 IMMUNOFIX E-PHORESIS SERUM: CPT | Performed by: INTERNAL MEDICINE

## 2019-03-05 PROCEDURE — G0008 ADMIN INFLUENZA VIRUS VAC: HCPCS | Performed by: INTERNAL MEDICINE

## 2019-03-05 PROCEDURE — 90661 CCIIV3 VAC ABX FR 0.5 ML IM: CPT | Performed by: INTERNAL MEDICINE

## 2019-03-05 PROCEDURE — 25010000002 DEXAMETHASONE PER 1 MG: Performed by: NURSE PRACTITIONER

## 2019-03-05 PROCEDURE — 25010000002 FUROSEMIDE PER 20 MG: Performed by: INTERNAL MEDICINE

## 2019-03-05 PROCEDURE — 99232 SBSQ HOSP IP/OBS MODERATE 35: CPT | Performed by: NEUROLOGICAL SURGERY

## 2019-03-05 PROCEDURE — 83036 HEMOGLOBIN GLYCOSYLATED A1C: CPT | Performed by: INTERNAL MEDICINE

## 2019-03-05 PROCEDURE — 82784 ASSAY IGA/IGD/IGG/IGM EACH: CPT | Performed by: INTERNAL MEDICINE

## 2019-03-05 PROCEDURE — 83970 ASSAY OF PARATHORMONE: CPT | Performed by: INTERNAL MEDICINE

## 2019-03-05 PROCEDURE — 83883 ASSAY NEPHELOMETRY NOT SPEC: CPT | Performed by: INTERNAL MEDICINE

## 2019-03-05 PROCEDURE — 85025 COMPLETE CBC W/AUTO DIFF WBC: CPT | Performed by: INTERNAL MEDICINE

## 2019-03-05 PROCEDURE — 80053 COMPREHEN METABOLIC PANEL: CPT | Performed by: INTERNAL MEDICINE

## 2019-03-05 PROCEDURE — 83735 ASSAY OF MAGNESIUM: CPT | Performed by: INTERNAL MEDICINE

## 2019-03-05 PROCEDURE — 86335 IMMUNFIX E-PHORSIS/URINE/CSF: CPT | Performed by: INTERNAL MEDICINE

## 2019-03-05 PROCEDURE — 85610 PROTHROMBIN TIME: CPT | Performed by: INTERNAL MEDICINE

## 2019-03-05 PROCEDURE — 25010000002 INFLUENZA VAC SUBUNIT QUAD 0.5 ML SUSPENSION PREFILLED SYRINGE: Performed by: INTERNAL MEDICINE

## 2019-03-05 RX ORDER — SODIUM CHLORIDE 9 MG/ML
100 INJECTION, SOLUTION INTRAVENOUS CONTINUOUS
Status: DISCONTINUED | OUTPATIENT
Start: 2019-03-05 | End: 2019-03-14

## 2019-03-05 RX ORDER — FUROSEMIDE 10 MG/ML
40 INJECTION INTRAMUSCULAR; INTRAVENOUS DAILY
Status: DISCONTINUED | OUTPATIENT
Start: 2019-03-05 | End: 2019-03-10

## 2019-03-05 RX ORDER — METHYLPREDNISOLONE 4 MG/1
TABLET ORAL
Qty: 21 TABLET | Refills: 0 | Status: SHIPPED | OUTPATIENT
Start: 2019-03-05 | End: 2019-03-20 | Stop reason: HOSPADM

## 2019-03-05 RX ORDER — HYDROCODONE BITARTRATE AND ACETAMINOPHEN 7.5; 325 MG/1; MG/1
1 TABLET ORAL EVERY 4 HOURS PRN
Status: DISCONTINUED | OUTPATIENT
Start: 2019-03-05 | End: 2019-03-14 | Stop reason: SDUPTHER

## 2019-03-05 RX ADMIN — SODIUM CHLORIDE, PRESERVATIVE FREE 3 ML: 5 INJECTION INTRAVENOUS at 22:55

## 2019-03-05 RX ADMIN — FUROSEMIDE 40 MG: 10 INJECTION, SOLUTION INTRAMUSCULAR; INTRAVENOUS at 17:22

## 2019-03-05 RX ADMIN — HYDROCODONE BITARTRATE AND ACETAMINOPHEN 2 TABLET: 7.5; 325 TABLET ORAL at 09:16

## 2019-03-05 RX ADMIN — HYDROCODONE BITARTRATE AND ACETAMINOPHEN 1 TABLET: 7.5; 325 TABLET ORAL at 22:54

## 2019-03-05 RX ADMIN — INFLUENZA A VIRUS A/SINGAPORE/GP1908/2015 IVR-180 (H1N1) ANTIGEN (MDCK CELL DERIVED, PROPIOLACTONE INACTIVATED), INFLUENZA A VIRUS A/NORTH CAROLINA/04/2016 (H3N2) HEMAGGLUTININ ANTIGEN (MDCK CELL DERIVED, PROPIOLACTONE INACTIVATED), INFLUENZA B VIRUS B/IOWA/06/2017 HEMAGGLUTININ ANTIGEN (MDCK CELL DERIVED, PROPIOLACTONE INACTIVATED), INFLUENZA B VIRUS B/SINGAPORE/INFTT-16-0610/2016 HEMAGGLUTININ ANTIGEN (MDCK CELL DERIVED, PROPIOLACTONE INACTIVATED) 0.5 ML: 15; 15; 15; 15 INJECTION, SUSPENSION INTRAMUSCULAR at 12:33

## 2019-03-05 RX ADMIN — DEXAMETHASONE SODIUM PHOSPHATE 4 MG: 4 INJECTION, SOLUTION INTRAMUSCULAR; INTRAVENOUS at 16:57

## 2019-03-05 RX ADMIN — SODIUM CHLORIDE, PRESERVATIVE FREE 3 ML: 5 INJECTION INTRAVENOUS at 09:16

## 2019-03-05 RX ADMIN — DEXAMETHASONE SODIUM PHOSPHATE 4 MG: 4 INJECTION, SOLUTION INTRAMUSCULAR; INTRAVENOUS at 05:04

## 2019-03-05 RX ADMIN — HYDROCHLOROTHIAZIDE 25 MG: 25 TABLET ORAL at 09:15

## 2019-03-05 RX ADMIN — DEXAMETHASONE SODIUM PHOSPHATE 4 MG: 4 INJECTION, SOLUTION INTRAMUSCULAR; INTRAVENOUS at 12:33

## 2019-03-05 RX ADMIN — DEXAMETHASONE SODIUM PHOSPHATE 4 MG: 4 INJECTION, SOLUTION INTRAMUSCULAR; INTRAVENOUS at 22:54

## 2019-03-05 RX ADMIN — SODIUM CHLORIDE 100 ML/HR: 9 INJECTION, SOLUTION INTRAVENOUS at 17:22

## 2019-03-05 RX ADMIN — FAMOTIDINE 20 MG: 20 TABLET, FILM COATED ORAL at 09:15

## 2019-03-05 RX ADMIN — METOPROLOL TARTRATE 25 MG: 25 TABLET ORAL at 05:04

## 2019-03-05 NOTE — PROGRESS NOTES
ALMA: prescribed Medrol dose leatha to listed [facility] pharmacy for  and completion after DC. Should continue on GI prophylaxis while taking steroid. We will arrange OP follow up in 4-6 weeks. She should contact us with progressive vision changes, headaches, or other new complaints. Sudden or severe neurologic changes or headaches should be evaluated in ER. We advise against driving due to double vision.

## 2019-03-05 NOTE — PLAN OF CARE
Problem: Patient Care Overview  Goal: Plan of Care Review  Outcome: Ongoing (interventions implemented as appropriate)   03/05/19 4730   Coping/Psychosocial   Plan of Care Reviewed With patient   OTHER   Outcome Summary VSS, pain meds X 1. Still has double vision.Will continue to monitor.   Plan of Care Review   Progress improving     Goal: Individualization and Mutuality  Outcome: Ongoing (interventions implemented as appropriate)      Problem: Skin Injury Risk (Adult)  Goal: Skin Health and Integrity  Outcome: Ongoing (interventions implemented as appropriate)      Problem: Pain, Acute (Adult)  Goal: Acceptable Pain Control/Comfort Level  Outcome: Ongoing (interventions implemented as appropriate)

## 2019-03-05 NOTE — CONSULTS
"  Referring Provider: Surjit Ellsworth MD   Reason for Consultation: hypercalcemia    Subjective     Chief complaint   Chief Complaint   Patient presents with   • Headache   • Eye Pain   • Diplopia       History of present illness:      65 Y/O WF with PMH of morbid obesity and HTN who presented to the hospital because of blurry vision and double vision MRI showed the meningioma however her calcium wasn't noted to be 13 today so we will consulted to help in management of her hypercalcemia.  Patient reported 130 pound weight loss and intended and she said that she lost her appetite.  She denied any black stool and she stated that she had the mammogram and Pap smear 2 years ago.    Past Medical History:   Diagnosis Date   • Morbid obesity (CMS/HCC)    • Ovarian cyst    • Tachycardia      Past Surgical History:   Procedure Laterality Date   • ADENOIDECTOMY     • CHOLECYSTECTOMY     • OVARY SURGERY Right    • TONSILLECTOMY       History reviewed. No pertinent family history.  Social History     Tobacco Use   • Smoking status: Never Smoker   Substance Use Topics   • Alcohol use: No     Frequency: Never   • Drug use: No     Medications Prior to Admission   Medication Sig Dispense Refill Last Dose   • cholecalciferol (VITAMIN D3) 1000 units tablet Take 3,000 Units by mouth Daily.      • ibuprofen (ADVIL,MOTRIN) 200 MG tablet Take 400 mg by mouth Every 6 (Six) Hours As Needed for Mild Pain .      • omeprazole (priLOSEC) 20 MG capsule Take 20 mg by mouth As Needed.        Allergies:  Patient has no known allergies.    Review of Systems  Pertinent items are noted in HPI.    Objective     Vital Signs  Temp:  [97.7 °F (36.5 °C)-98.3 °F (36.8 °C)] 97.7 °F (36.5 °C)  Heart Rate:  [55-88] 55  Resp:  [18-20] 18  BP: (150-178)/() 150/73    Flowsheet Rows      First Filed Value   Admission Height  157.5 cm (62\") Documented at 03/04/2019 1050   Admission Weight  122 kg (270 lb) Documented at 03/04/2019 1050           No " intake/output data recorded.  I/O last 3 completed shifts:  In: 200 [P.O.:200]  Out: -     Intake/Output Summary (Last 24 hours) at 3/5/2019 1608  Last data filed at 3/5/2019 0344  Gross per 24 hour   Intake 200 ml   Output --   Net 200 ml       Physical Exam:     General Appearance:    Alert, cooperative, in no acute distress   Head:    Normocephalic, without obvious abnormality, atraumatic   Eyes:            Lids and lashes normal, conjunctivae and sclerae normal, no   icterus, no pallor, corneas clear, PERRLA   Ears:    Ears appear intact with no abnormalities noted   Throat:   No oral lesions, no thrush, oral mucosa moist   Neck:   No adenopathy, supple, trachea midline, no thyromegaly, no     carotid bruit, no JVD   Back:     No kyphosis present, no scoliosis present, no skin lesions,       erythema or scars, no tenderness to percussion or                   palpation,   range of motion normal   Lungs:     Clear to auscultation,respirations regular, even and                   unlabored    Heart:    Regular rhythm and normal rate, normal S1 and S2, no            murmur, no gallop, no rub, no click   Breast Exam:    Deferred   Abdomen:     Normal bowel sounds, no masses, no organomegaly, soft        non-tender, non-distended, no guarding, no rebound                 tenderness   Genitalia:    Deferred   Extremities:   Moves all extremities well, no edema, no cyanosis, no              redness   Pulses:   Pulses palpable and equal bilaterally   Skin:   No bleeding, bruising or rash   Lymph nodes:   No palpable adenopathy   Neurologic:   Cranial nerves 2 - 12 grossly intact, sensation intact, DTR        present and equal bilaterally       Results Review:  Results from last 7 days   Lab Units 03/05/19  0607 03/04/19  1151   SODIUM mmol/L 139 143   POTASSIUM mmol/L 3.9 4.0   CHLORIDE mmol/L 103 106   CO2 mmol/L 24.7 24.7   BUN mg/dL 10 9   CREATININE mg/dL 0.83 0.79   CALCIUM mg/dL 13.0* 12.3*   BILIRUBIN mg/dL 0.6  --     ALK PHOS U/L 114  --    ALT (SGPT) U/L 12  --    AST (SGOT) U/L 11  --    GLUCOSE mg/dL 169* 104*       Estimated Creatinine Clearance: 85.3 mL/min (by C-G formula based on SCr of 0.83 mg/dL).    Results from last 7 days   Lab Units 03/05/19  0607   MAGNESIUM mg/dL 1.8       Results from last 7 days   Lab Units 03/05/19  0607 03/04/19  1151   WBC 10*3/mm3 9.40 9.63   HEMOGLOBIN g/dL 15.2 15.3   PLATELETS 10*3/mm3 228 208       Results from last 7 days   Lab Units 03/05/19  0607   INR  1.04       Active Medications    dexamethasone 4 mg Intravenous Q6H   famotidine 20 mg Oral BID   Hydrochlorothiazide Oral 25 mg Oral Daily   metoprolol tartrate 25 mg Oral Q12H   sodium chloride 3 mL Intravenous Q12H          Assessment/Plan     1. Hypercalcemia: Is a significant history of weight loss and intended the associated with her hypercalcemia.  I don't think her high calcium can be explained by being nonambulatory but being on thiazide can explain it.   We'll check PTH, parathyroid related peptide and the vitamin D as well as free serum light chain. Start IVF and lasix.    2. Morbidly obese  3. Hypertension- Hold Thiazide  4. 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

## 2019-03-05 NOTE — PROGRESS NOTES
[facility] PROGRESS NOTE    PATIENT IDENTIFICATION:   Name:  Joan Almanza      MRN:  [id number]     64 y.o.  female               CC: Arteriovenous fistula right parieto-occipital region, left cavernous sinus tumor      Subjective     Interval History: has complaints diplopia in all directions except when she looks up.  Headache is resolved..    Objective     Vital signs in last 24 hours:  Temp:  [98.2 °F (36.8 °C)-98.4 °F (36.9 °C)] 98.3 °F (36.8 °C)  Heart Rate:  [] 66  Resp:  [16-20] 20  BP: (159-194)/() 178/89  ICP ranges-    Intake/Output last 3 shifts:  I/O last 3 completed shifts:  In: 200 [P.O.:200]  Out: -     Intake/Output this shift:  No intake/output data recorded.      Physical Exam:  General:   Awake, alert, and oriented x 3. NAD    CN III IV VI: Extraocular movements are full , PERRL   CN V: Normal facial sensation and strength of muscles of mastication.  CN VII: Facial movements are symmetric. No weakness.      Motor: Normal muscle strength, bulk and tone in upper and lower extremities.  No fasciculations, rigidity, spasticity, or abnormal movements.  Reflexes: 2+ in the upper and lower extremities. Plantar responses are flexor.  Sensation: Normal to light touch, pinprick, vibration, temperature, and proprioception in arms and legs. Normal graphesthesia and no extinction on DSS.  Station and Gait: Normal gait and station.    Coordination: Finger to nose test shows no dysmetria.  Rapid alternating movements are normal.  Heel to shin normal.  Extremities:  Patient is not wearing IRASEMA and SCD bilaterally    LABS:    Lab Results (last 24 hours)     Procedure Component Value Units Date/Time    CBC & Differential [[id number]] Collected:  03/04/19 1151    Specimen:  Blood Updated:  03/04/19 1240    Narrative:       The following orders were created for panel order CBC & Differential.  Procedure                               Abnormality         Status                      ---------                               -----------         ------                     CBC Auto Differential[[id number]]        Abnormal            Final result                 Please view results for these tests on the individual orders.    Basic Metabolic Panel [[id number]]  (Abnormal) Collected:  03/04/19 1151    Specimen:  Blood Updated:  03/04/19 1303     Glucose 104 mg/dL      BUN 9 mg/dL      Creatinine 0.79 mg/dL      Sodium 143 mmol/L      Potassium 4.0 mmol/L      Chloride 106 mmol/L      CO2 24.7 mmol/L      Calcium 12.3 mg/dL      eGFR Non African Amer 73 mL/min/1.73      BUN/Creatinine Ratio 11.4     Anion Gap 12.3 mmol/L     Narrative:       GFR Normal >60  Chronic Kidney Disease <60  Kidney Failure <15    Sedimentation Rate [[id number]]  (Abnormal) Collected:  03/04/19 1151    Specimen:  Blood Updated:  03/04/19 1308     Sed Rate 41 mm/hr     C-reactive Protein [[id number]]  (Normal) Collected:  03/04/19 1151    Specimen:  Blood Updated:  03/04/19 1255     C-Reactive Protein 0.06 mg/dL     TSH [[id number]]  (Normal) Collected:  03/04/19 1151    Specimen:  Blood Updated:  03/04/19 1300     TSH 1.310 mIU/mL     T4, Free [[id number]]  (Normal) Collected:  03/04/19 1151    Specimen:  Blood Updated:  03/04/19 1300     Free T4 1.36 ng/dL     CBC Auto Differential [[id number]]  (Abnormal) Collected:  03/04/19 1151    Specimen:  Blood Updated:  03/04/19 1240     WBC 9.63 10*3/mm3      RBC 5.24 10*6/mm3      Hemoglobin 15.3 g/dL      Hematocrit 45.9 %      MCV 87.6 fL      MCH 29.2 pg      MCHC 33.3 g/dL      RDW 14.0 %      RDW-SD 44.1 fl      MPV 9.8 fL      Platelets 208 10*3/mm3      Neutrophil % 75.7 %      Lymphocyte % 17.4 %      Monocyte % 5.4 %      Eosinophil % 0.7 %      Basophil % 0.2 %      Immature Grans % 0.6 %      Neutrophils, Absolute 7.28 10*3/mm3      Lymphocytes, Absolute 1.68 10*3/mm3      Monocytes, Absolute 0.52 10*3/mm3      Eosinophils, Absolute 0.07 10*3/mm3      Basophils, Absolute 0.02  10*3/mm3      Immature Grans, Absolute 0.06 10*3/mm3      nRBC 0.0 /100 WBC     Comprehensive Metabolic Panel [[id number]]  (Abnormal) Collected:  03/05/19 0607    Specimen:  Blood Updated:  03/05/19 0658     Glucose 169 mg/dL      BUN 10 mg/dL      Creatinine 0.83 mg/dL      Sodium 139 mmol/L      Potassium 3.9 mmol/L      Chloride 103 mmol/L      CO2 24.7 mmol/L      Calcium 13.0 mg/dL      Total Protein 7.9 g/dL      Albumin 4.10 g/dL      ALT (SGPT) 12 U/L      AST (SGOT) 11 U/L      Alkaline Phosphatase 114 U/L      Total Bilirubin 0.6 mg/dL      eGFR Non African Amer 69 mL/min/1.73      Globulin 3.8 gm/dL      A/G Ratio 1.1 g/dL      BUN/Creatinine Ratio 12.0     Anion Gap 11.3 mmol/L     Narrative:       GFR Normal >60  Chronic Kidney Disease <60  Kidney Failure <15    CBC Auto Differential [[id number]]  (Abnormal) Collected:  03/05/19 0607    Specimen:  Blood Updated:  03/05/19 0628     WBC 9.40 10*3/mm3      RBC 5.19 10*6/mm3      Hemoglobin 15.2 g/dL      Hematocrit 44.9 %      MCV 86.5 fL      MCH 29.3 pg      MCHC 33.9 g/dL      RDW 13.6 %      RDW-SD 42.4 fl      MPV 9.9 fL      Platelets 228 10*3/mm3      Neutrophil % 89.3 %      Lymphocyte % 7.3 %      Monocyte % 1.9 %      Eosinophil % 0.0 %      Basophil % 0.1 %      Immature Grans % 1.4 %      Neutrophils, Absolute 8.39 10*3/mm3      Lymphocytes, Absolute 0.69 10*3/mm3      Monocytes, Absolute 0.18 10*3/mm3      Eosinophils, Absolute 0.00 10*3/mm3      Basophils, Absolute 0.01 10*3/mm3      Immature Grans, Absolute 0.13 10*3/mm3      nRBC 0.0 /100 WBC     Hemoglobin A1c [[id number]] Collected:  03/05/19 0607    Specimen:  Blood Updated:  03/05/19 0621    Magnesium [[id number]]  (Normal) Collected:  03/05/19 0607    Specimen:  Blood Updated:  03/05/19 0652     Magnesium 1.8 mg/dL     Protime-INR [[id number]]  (Normal) Collected:  03/05/19 0607    Specimen:  Blood Updated:  03/05/19 0645     Protime 13.4 Seconds      INR 1.04          IMAGING  STUDIES:    I personally viewed and interpreted the patient's MRI of the brain and MRA of brain and I agree with radiology interpretation.      Meds reviewed/changed: Yes    Current Facility-Administered Medications   Medication Dose Route Frequency Provider Last Rate Last Dose   • acetaminophen (TYLENOL) tablet 650 mg  650 mg Oral Q4H PRN Alice Simmons MD       • dexamethasone (DECADRON) injection 4 mg  4 mg Intravenous Q6H Elisabet Skinner APRN   4 mg at 03/05/19 0504   • famotidine (PEPCID) tablet 20 mg  20 mg Oral BID Elisabet Skinner APRN   20 mg at 03/04/19 2046   • hydrochlorothiazide (HYDRODIURIL) tablet 25 mg  25 mg Oral Daily Bull Branch II, MD   25 mg at 03/04/19 1320   • HYDROcodone-acetaminophen (NORCO) 7.5-325 MG per tablet 2 tablet  2 tablet Oral Q4H PRN Alice Simmons MD       • HYDROmorphone (DILAUDID) injection 0.5 mg  0.5 mg Intravenous Q2H PRN Alice Simmons MD        And   • naloxone (NARCAN) injection 0.4 mg  0.4 mg Intravenous Q5 Min PRN Alice Simmons MD       • Influenza Vac Subunit Quad (FLUCELVAX) injection 0.5 mL  0.5 mL Intramuscular Once Alice Simmons MD       • metoprolol tartrate (LOPRESSOR) tablet 25 mg  25 mg Oral Q12H Alice Simmons MD   25 mg at 03/05/19 0504   • nitroglycerin (NITROSTAT) SL tablet 0.4 mg  0.4 mg Sublingual Q5 Min PRN Alice Simmons MD       • ondansetron (ZOFRAN) injection 4 mg  4 mg Intravenous Q6H PRN Alice Simmons MD       • sodium chloride 0.9 % flush 10 mL  10 mL Intravenous PRN Bull Branch II, MD       • sodium chloride 0.9 % flush 3 mL  3 mL Intravenous Q12H Alice Simmons MD   3 mL at 03/04/19 2319   • sodium chloride 0.9 % flush 3-10 mL  3-10 mL Intravenous PRN Alice Simmons MD           Assessment/Plan     ASSESSMENT:      Diplopia    Cerebral edema (CMS/HCC)    Meningioma (CMS/HCC)    AVF (arteriovenous fistula) (CMS/HCC)    6th nerve palsy, left    I had a long discussion with the patient with regard to the dural arteriovenous  malformation/arteriovenous fistula as well as her cavernous sinus tumor.  I explained various treatment options to the patient including observation, surgical management, radiosurgical management, endovascular management etc.    I explained that an arteriovenous malformation of this type in this location carries up to a 5-10% risk of hemorrhage on a yearly basis and I recommended consideration of treatment earlier rather than later.  The patient indicates that she does not have any insurance is does not want to incur significant cost at this time prior to obtaining Medicare June 1.    I also went over the various risks benefits alternatives of treatment of the cavernous sinus tumor.  These may require surgical reduction in the size of the mass and then radiosurgical treatment.    Patient is not interested in management radiosurgical treatment or embolization treatment at this time but rather would like him back to the office in about a month for further discussion and to see how she is doing.      PLAN:   I recommend follow-up with us in approximately 1 month.    I discussed the patients findings and my recommendations with patient       LOS: 1 day       Abel Bhakta MD  3/5/2019  8:30 AM

## 2019-03-05 NOTE — H&P
Internal medicine history and physical  INTERNAL MEDICINE   [facility]       Patient Identification:  Name: Joan Almanza  Age: 64 y.o.  Sex: female  :  1955  MRN: [id number]                   Primary Care Physician: Juarez Winters MD                                   Chief Complaint: Progressive double vision since this past  associated with headache and pain over the weekend.    History of Present Illness:   Patient is a 64-year-old female with past medical history remarkable for morbid obesity with history of baseline weight of 400 pounds has lost about 130 pounds in the last 3 years and currently weighs around/about 270 pounds was in her usual state of her health until late  afternoon/evening she noticed that she is seeing some lines from her left eye.  She thought that she must have strained herself doing work on the computer so she did not think much of it until next morning when she woke up and noticed that she was seeing double with gets corrected when she covers one eye.  She has 87 days before her Medicare kicks in so she thought that maybe she could tough it out and it would get better.  By the weekend she started having discomfort and pain when she started to look up or move her eyes to look at different places.  This got her very much concerned so she decided to come to the emergency room for further evaluation.  Workup in the emergency room revealed a left 6th nerve palsy and meningioma in the left middle cranial fossa with invasion to the adjacent cavernous sinus causing associated vasogenic edema within the left anterior temporal lobe.  Additionally patient was noted to have a dural AV fistula along the lateral aspect of the right temporal occipital junction with prominent venous vasculature seen along the lateral aspect of the right temporal and occipital lobes.  Because of these findings patient was discussed with neurosurgery service and is being admitted  for further care.  Patient has not seen her physician for quite some time but does recall being on blood pressure medicine that was supposed to control her heart rate.            Past Medical History:  Past Medical History:   Diagnosis Date   • Morbid obesity (CMS/HCC)    • Ovarian cyst    • Tachycardia      Past Surgical History:  Past Surgical History:   Procedure Laterality Date   • ADENOIDECTOMY     • CHOLECYSTECTOMY     • OVARY SURGERY Right    • TONSILLECTOMY        Home Meds:  Medications Prior to Admission   Medication Sig Dispense Refill Last Dose   • cholecalciferol (VITAMIN D3) 1000 units tablet Take 3,000 Units by mouth Daily.      • ibuprofen (ADVIL,MOTRIN) 200 MG tablet Take 400 mg by mouth Every 6 (Six) Hours As Needed for Mild Pain .      • omeprazole (priLOSEC) 20 MG capsule Take 20 mg by mouth As Needed.        Current Meds:     Current Facility-Administered Medications:   •  dexamethasone (DECADRON) injection 4 mg, 4 mg, Intravenous, Q6H, Elisabet Skinner APRN  •  famotidine (PEPCID) tablet 20 mg, 20 mg, Oral, BID, Elisabet Skinner APRN  •  hydrochlorothiazide (HYDRODIURIL) tablet 25 mg, 25 mg, Oral, Daily, Bull Branch II, MD, 25 mg at 03/04/19 1320  •  [START ON 3/5/2019] Influenza Vac Subunit Quad (FLUCELVAX) injection 0.5 mL, 0.5 mL, Intramuscular, Once, Alice Simmons MD  •  [COMPLETED] Insert peripheral IV, , , Once **AND** sodium chloride 0.9 % flush 10 mL, 10 mL, Intravenous, PRN, Bull Branch II, MD  Allergies:  No Known Allergies  Social History:   Social History     Tobacco Use   • Smoking status: Never Smoker   Substance Use Topics   • Alcohol use: No     Frequency: Never      Family History:  History reviewed. No pertinent family history.       Review of Systems  See history of present illness and past medical history.   Constitutional: Negative for fever.   HENT: Negative for sore throat.    Eyes: Positive for visual disturbance (Diplopia).   Respiratory: Negative  "for shortness of breath.    Cardiovascular: Negative for chest pain.   Gastrointestinal: Negative for abdominal pain.   Endocrine: Negative for polyuria.   Genitourinary: Negative for dysuria.   Musculoskeletal: Negative for neck pain.   Skin: Negative for rash.   Neurological: Positive for headaches.   All other systems reviewed and are negative.        Vitals:   /88 (BP Location: Right arm, Patient Position: Sitting)   Pulse 73   Temp 98.2 °F (36.8 °C) (Oral)   Resp 18   Ht 157.5 cm (62\")   Wt 122 kg (270 lb)   SpO2 96%   BMI 49.38 kg/m²   I/O: No intake or output data in the 24 hours ending 03/04/19 2029  Exam:  General Appearance:    Alert, cooperative, morbidly obese female, no distress, appears stated age   Head:    Normocephalic, without obvious abnormality, atraumatic   Eyes:    PERRL, conjunctiva/corneas clear, EOM's intact, both eyes   Ears:    Normal external ear canals, both ears   Nose:   Nares normal, septum midline, mucosa normal, no drainage    or sinus tenderness   Throat:   Lips, tongue, gums normal; oral mucosa pink and moist   Neck:   Supple, symmetrical, trachea midline, no adenopathy;     thyroid:  no enlargement/tenderness/nodules; no carotid    bruit or JVD   Back:     Symmetric, no curvature, ROM normal, no CVA tenderness   Lungs:     Clear to auscultation bilaterally, respirations unlabored   Chest Wall:    No tenderness or deformity    Heart:    Regular rate and rhythm, S1 and S2 normal, no murmur, rub   or gallop   Abdomen:     Soft, obese, non-tender, bowel sounds active all four quadrants,     no masses, no hepatomegaly, no splenomegaly   Extremities:  Chronic edema due to lymphedema.   Pulses:   Pulses palpable in all extremities; symmetric all extremities   Skin:   Skin color normal, Skin is warm and dry,  no rashes or palpable lesions   Neurologic:  Left 6th nerve palsy noted, no other focal neurological deficits noted       Data Review:      I reviewed the patient's new " clinical results.  Results from last 7 days   Lab Units 03/04/19  1151   WBC 10*3/mm3 9.63   HEMOGLOBIN g/dL 15.3   PLATELETS 10*3/mm3 208     Results from last 7 days   Lab Units 03/04/19  1151   SODIUM mmol/L 143   POTASSIUM mmol/L 4.0   CHLORIDE mmol/L 106   CO2 mmol/L 24.7   BUN mg/dL 9   CREATININE mg/dL 0.79   CALCIUM mg/dL 12.3*   GLUCOSE mg/dL 104*       Assessment:  Active Hospital Problems    Diagnosis Date Noted   • **Diplopia [H53.2] 03/04/2019   • Cerebral edema (CMS/HCC) [G93.6] 03/04/2019   • Meningioma (CMS/HCC) [D32.9] 03/04/2019   • AVF (arteriovenous fistula) (CMS/[facility]) [I77.0] 03/04/2019   • 6th nerve palsy, left [H49.22] 03/04/2019       Medical decision making:  Diplopia secondary to intracranial meningioma with cerebral edema and 6th nerve palsy-patient has been seen by neurosurgery service and has been started on Decadron.  Plan is currently observe and see the impact of steroid on her vision symptoms and nerve palsy.  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  Morbid obesity-nutritional consult.  Hypertension-continue her antihypertensive medication and introduce beta-blockers unrelated to address her heart rate but also blood pressure to keep it within targeted range of less than 150.  Hypercalcemia-multifactorial including possibility of dehydration.  Will check PTH molecule if repeat BMP does not show improvement.  Also consideration should be given for possibility of underlying malignancy manifesting as hypercalcemia.    Alice Simmons MD   3/4/2019  8:29 PM  Much of this encounter note is an electronic transcription/translation of spoken language to printed text. The electronic translation of spoken language may permit erroneous, or at times, nonsensical words or phrases to be inadvertently transcribed; Although I have reviewed the  note for such errors, some may still exist

## 2019-03-05 NOTE — DISCHARGE INSTRUCTIONS
prescribed Medrol dose leatha to listed [facility] pharmacy for  and completion after DC. Should continue on GI prophylaxis (Pepcid, Prilosec, etc) while taking steroid. We will arrange OP follow up in 4-6 weeks with Dr. Bhakta. She should contact us with progressive vision changes, headaches, or other new complaints. Sudden or severe neurologic changes or headaches should be evaluated in ER. We advise against driving due to double vision.

## 2019-03-05 NOTE — PROGRESS NOTES
[facility]     LOS: 1 day     Subjective:    CC:Headache; Eye Pain; and Diplopia    DIET:  Diet Order   Procedures   • Diet Regular     No chest pain and no soa    Objective:    Vital Signs:  Temp:  [98.2 °F (36.8 °C)-98.4 °F (36.9 °C)] 98.2 °F (36.8 °C)  Heart Rate:  [] 66  Resp:  [16-20] 20  BP: (159-194)/() 178/89    SpO2:  [94 %-98 %] 95 %  on   ;   Device (Oxygen Therapy): room air  Body mass index is 49.38 kg/m².    Physical Exam   Constitutional: She appears well-developed and well-nourished.   HENT:   Head: Normocephalic and atraumatic.   Cardiovascular: Exam reveals no friction rub.   No murmur heard.  Pulmonary/Chest: Effort normal and breath sounds normal.   Abdominal: Soft. Bowel sounds are normal. She exhibits no distension. There is no tenderness.   Neurological: She is alert.   Skin: Skin is warm and dry.       Results Review:    Glucose   Date Value Ref Range Status   03/05/2019 169 (H) 65 - 99 mg/dL Final   03/04/2019 104 (H) 65 - 99 mg/dL Final     Results from last 7 days   Lab Units 03/05/19  0607   WBC 10*3/mm3 9.40   HEMOGLOBIN g/dL 15.2   HEMATOCRIT % 44.9   PLATELETS 10*3/mm3 228     Results from last 7 days   Lab Units 03/05/19  0607   SODIUM mmol/L 139   POTASSIUM mmol/L 3.9   CHLORIDE mmol/L 103   CO2 mmol/L 24.7   BUN mg/dL 10   CREATININE mg/dL 0.83   CALCIUM mg/dL 13.0*   BILIRUBIN mg/dL 0.6   ALK PHOS U/L 114   ALT (SGPT) U/L 12   AST (SGOT) U/L 11   GLUCOSE mg/dL 169*     Results from last 7 days   Lab Units 03/05/19  0607   INR  1.04     Results from last 7 days   Lab Units 03/05/19  0607   MAGNESIUM mg/dL 1.8         Cultures:       I have reviewed daily medications and changes in CPOE    Scheduled meds    dexamethasone 4 mg Intravenous Q6H   famotidine 20 mg Oral BID   Hydrochlorothiazide Oral 25 mg Oral Daily   influenza vaccine 0.5 mL Intramuscular Once   metoprolol tartrate 25 mg Oral Q12H   sodium chloride 3 mL Intravenous Q12H           PRN meds  •  acetaminophen  •  HYDROcodone-acetaminophen  •  HYDROmorphone **AND** naloxone  •  nitroglycerin  •  ondansetron  •  [COMPLETED] Insert peripheral IV **AND** sodium chloride  •  sodium chloride        Diplopia    Cerebral edema (CMS/HCC)    Meningioma (CMS/HCC)    AVF (arteriovenous fistula) (CMS/HCC)    6th nerve palsy, left        Assessment/Plan:    Diplopia secondary to intracranial meningioma with cerebral edema and 6th nerve palsy-patient has been seen by neurosurgery service and has been started on Decadron.    -d/w Lisa Lester  -medrol dose pack on discharge  -4-6 week follow outpatient  -no driving    Intracranial dural AV fistula   -follow outpatient with [facility]  -keep bp less than 150 systolic  -HCTZ (but could make the calcium worse, so may need to stop) and metoprolol    Morbid obesity-nutritional consult.    Hypertension-as above    Hypercalcemia-multifactorial including possibility of dehydration.  Will check PTH molecule if repeat BMP does not show improvement.  Also consideration should be given for possibility of underlying malignancy manifesting as hypercalcemia.     DVT PPX: scd, no lovenox because of risk of bleeding    >35 minutes spent, > 1/2 time spent counseling and coordination of care     Surjit Ellsworth MD  03/05/19  10:19 AM

## 2019-03-05 NOTE — TELEPHONE ENCOUNTER
Scheduled appt with JACOBY on 4/9 @ 10:45am.  Called Laura on [address] with appt info.  Letter/forms mailed to pt.

## 2019-03-05 NOTE — PLAN OF CARE
Problem: Patient Care Overview  Goal: Plan of Care Review  Outcome: Ongoing (interventions implemented as appropriate)   03/05/19 9997   Coping/Psychosocial   Plan of Care Reviewed With patient   OTHER   Outcome Summary VSS, pain improved since med dose given in ER, still complains of double vision, IV steroids given, neuro has seen, sinus nicole on monitor, will continue to monitor.   Plan of Care Review   Progress improving       Problem: Pain, Acute (Adult)  Goal: Identify Related Risk Factors and Signs and Symptoms  Outcome: Ongoing (interventions implemented as appropriate)    Goal: Acceptable Pain Control/Comfort Level  Outcome: Ongoing (interventions implemented as appropriate)

## 2019-03-05 NOTE — TELEPHONE ENCOUNTER
----- Message from MILA Herman sent at 3/5/2019  9:19 AM EST -----  Regarding: f/u  Pt seen for AVF and brain mass  by Dr. Abel Bhakta on March 4, 2019. Need follow up in 4-6 week(s)  without imaging. Ok to see MD to discuss surgical plans.

## 2019-03-06 LAB
ANION GAP SERPL CALCULATED.3IONS-SCNC: 12.2 MMOL/L
BUN BLD-MCNC: 20 MG/DL (ref 8–23)
BUN/CREAT SERPL: 20.4 (ref 7–25)
CALCIUM SPEC-SCNC: 13.3 MG/DL (ref 8.6–10.5)
CHLORIDE SERPL-SCNC: 100 MMOL/L (ref 98–107)
CO2 SERPL-SCNC: 26.8 MMOL/L (ref 22–29)
CREAT BLD-MCNC: 0.98 MG/DL (ref 0.57–1)
GFR SERPL CREATININE-BSD FRML MDRD: 57 ML/MIN/1.73
GLUCOSE BLD-MCNC: 115 MG/DL (ref 65–99)
POTASSIUM BLD-SCNC: 4.9 MMOL/L (ref 3.5–5.2)
SODIUM BLD-SCNC: 139 MMOL/L (ref 136–145)

## 2019-03-06 PROCEDURE — 80048 BASIC METABOLIC PNL TOTAL CA: CPT | Performed by: HOSPITALIST

## 2019-03-06 PROCEDURE — 25010000002 DEXAMETHASONE PER 1 MG: Performed by: NURSE PRACTITIONER

## 2019-03-06 PROCEDURE — 93005 ELECTROCARDIOGRAM TRACING: CPT | Performed by: INTERNAL MEDICINE

## 2019-03-06 PROCEDURE — 25010000002 FUROSEMIDE PER 20 MG: Performed by: INTERNAL MEDICINE

## 2019-03-06 PROCEDURE — 93010 ELECTROCARDIOGRAM REPORT: CPT | Performed by: INTERNAL MEDICINE

## 2019-03-06 PROCEDURE — 99253 IP/OBS CNSLTJ NEW/EST LOW 45: CPT | Performed by: INTERNAL MEDICINE

## 2019-03-06 RX ORDER — AMLODIPINE BESYLATE 2.5 MG/1
2.5 TABLET ORAL
Status: DISCONTINUED | OUTPATIENT
Start: 2019-03-06 | End: 2019-03-07

## 2019-03-06 RX ADMIN — FAMOTIDINE 20 MG: 20 TABLET, FILM COATED ORAL at 21:57

## 2019-03-06 RX ADMIN — DEXAMETHASONE SODIUM PHOSPHATE 4 MG: 4 INJECTION, SOLUTION INTRAMUSCULAR; INTRAVENOUS at 19:50

## 2019-03-06 RX ADMIN — SODIUM CHLORIDE 125 ML/HR: 9 INJECTION, SOLUTION INTRAVENOUS at 13:30

## 2019-03-06 RX ADMIN — SODIUM CHLORIDE 125 ML/HR: 9 INJECTION, SOLUTION INTRAVENOUS at 21:55

## 2019-03-06 RX ADMIN — SODIUM CHLORIDE, PRESERVATIVE FREE 3 ML: 5 INJECTION INTRAVENOUS at 21:56

## 2019-03-06 RX ADMIN — SODIUM CHLORIDE, PRESERVATIVE FREE 3 ML: 5 INJECTION INTRAVENOUS at 08:47

## 2019-03-06 RX ADMIN — DEXAMETHASONE SODIUM PHOSPHATE 4 MG: 4 INJECTION, SOLUTION INTRAMUSCULAR; INTRAVENOUS at 13:30

## 2019-03-06 RX ADMIN — AMLODIPINE BESYLATE 2.5 MG: 2.5 TABLET ORAL at 13:35

## 2019-03-06 RX ADMIN — DEXAMETHASONE SODIUM PHOSPHATE 4 MG: 4 INJECTION, SOLUTION INTRAMUSCULAR; INTRAVENOUS at 07:00

## 2019-03-06 RX ADMIN — FAMOTIDINE 20 MG: 20 TABLET, FILM COATED ORAL at 08:47

## 2019-03-06 RX ADMIN — FUROSEMIDE 40 MG: 10 INJECTION, SOLUTION INTRAMUSCULAR; INTRAVENOUS at 08:47

## 2019-03-06 RX ADMIN — ACETAMINOPHEN 650 MG: 325 TABLET, FILM COATED ORAL at 08:55

## 2019-03-06 RX ADMIN — SODIUM CHLORIDE 100 ML/HR: 9 INJECTION, SOLUTION INTRAVENOUS at 03:37

## 2019-03-06 NOTE — PROGRESS NOTES
NEPHROLOGY PROGRESS NOTE    PATIENT IDENTIFICATION:   Name:  Joan Almanza      MRN:  [id number]     64 y.o.  female             Reason for visit: Hypercalcemia    SUBJECTIVE:   Seen and examined. No SOA or CP  OBJECTIVE:  Vitals:    03/05/19 1930 03/05/19 2253 03/05/19 2303 03/06/19 0700   BP: 164/87 149/80  147/97   BP Location: Left arm   Left arm   Patient Position: Lying   Lying   Pulse: 53 51  58   Resp: 16   18   Temp: 97.3 °F (36.3 °C)  97.5 °F (36.4 °C) 98 °F (36.7 °C)   TempSrc: Oral  Oral Oral   SpO2:       Weight:       Height:               Body mass index is 49.38 kg/m².    Intake/Output Summary (Last 24 hours) at 3/6/2019 0826  Last data filed at 3/5/2019 2250  Gross per 24 hour   Intake --   Output 1500 ml   Net -1500 ml         Exam:  GEN:  No distress, appears stated age  EYES:   Anicteric sclera  ENT:    External ears/nose normal, MM are moist  NECK:  No adenopathy, JVP none  LUNGS: Normal chest on inspection; not labored  CV:  Normal S1S2, without murmur  ABD:  Non-tender, non-distended, no hepatosplenomegaly, +BS  EXT:  No edema; no cyanosis; clubbing    Scheduled meds:    dexamethasone 4 mg Intravenous Q6H   famotidine 20 mg Oral BID   furosemide 40 mg Intravenous Daily   metoprolol tartrate 25 mg Oral Q12H   sodium chloride 3 mL Intravenous Q12H     IV meds:                        sodium chloride 125 mL/hr Last Rate: 100 mL/hr (03/06/19 0337)       Data Review:    Results from last 7 days   Lab Units 03/05/19  0607 03/04/19  1151   SODIUM mmol/L 139 143   POTASSIUM mmol/L 3.9 4.0   CHLORIDE mmol/L 103 106   CO2 mmol/L 24.7 24.7   BUN mg/dL 10 9   CREATININE mg/dL 0.83 0.79   CALCIUM mg/dL 13.0* 12.3*   BILIRUBIN mg/dL 0.6  --    ALK PHOS U/L 114  --    ALT (SGPT) U/L 12  --    AST (SGOT) U/L 11  --    GLUCOSE mg/dL 169* 104*       Estimated Creatinine Clearance: 85.3 mL/min (by C-G formula based on SCr of 0.83 mg/dL).    Results from last 7 days   Lab Units 03/05/19  0607   MAGNESIUM  mg/dL 1.8       Results from last 7 days   Lab Units 03/05/19  0607 03/04/19  1151   WBC 10*3/mm3 9.40 9.63   HEMOGLOBIN g/dL 15.2 15.3   PLATELETS 10*3/mm3 228 208       Results from last 7 days   Lab Units 03/05/19  0607   INR  1.04             ASSESSMENT:     Diplopia    Cerebral edema (CMS/HCC)    Meningioma (CMS/HCC)    AVF (arteriovenous fistula) (CMS/HCC)    6th nerve palsy, left        1. Hypercalcemia: Is a significant history of weight loss and intended the associated with her hypercalcemia.  I don't think her high calcium can be explained by being nonambulatory but being on thiazide can explain it.   high PTH. Pending parathyroid related peptide and free serum light chain.     2. Morbidly obese  3. Hypertension- Hold Thiazide  4. Intracranial meningioma with cerebral edema and 6th nerve palsy     Plan:    Stop thiazide  Work-up for hyperparathyroidism as an outpatient  Continue Lasix and IVF  Surveillance labs        Ty Lofton MD  3/6/2019    8:26 AM

## 2019-03-06 NOTE — PROGRESS NOTES
Discharge Planning Assessment  [facility]     Patient Name: Joan Almanza  MRN: [id number]  Today's Date: 3/6/2019    Admit Date: 3/4/2019    Discharge Needs Assessment     Row Name 03/06/19 1105       Living Environment    Lives With  spouse    Name(s) of Who Lives With Patient  Jayy Almanza    Current Living Arrangements  home/apartment/condo    Primary Care Provided by  self    Provides Primary Care For  no one    Family Caregiver if Needed  spouse    Quality of Family Relationships  supportive;involved;helpful    Able to Return to Prior Arrangements  yes       Resource/Environmental Concerns    Resource/Environmental Concerns  other (see comments) Insurance       Transition Planning    Patient/Family Anticipates Transition to  home with family    Patient/Family Anticipated Services at Transition  none    Transportation Anticipated  family or friend will provide       Discharge Needs Assessment    Concerns to be Addressed  financial/insurance    Equipment Currently Used at Home  grab bar;ramp;cane, straight        Discharge Plan     Row Name 03/06/19 1107       Plan    Plan  Home with Spouse    Plan Comments  Spoke with pt for screening of DCP/needs.  Pt stated that she does live with her  and will be planning to return [facility] upon D/C.  Pt stated that she spoke with Bell lópez with [employer] and is in the process of applying for a [place] Medicaid spend down Card.   Per Bell with [employer] the spend down card with provided pt with Medicaid coverage from the time the application is submitted until the end of the following month.  Pt should have Medicaid spend down coverage untilt he end of 4/19.  During this spend down coverage time pt would have coverage for RX's however per Bell it may take up to 2 weeks for pt to show up having coverage in the medicaid system.  Pt did state that her Medicare will be effective as of 6/1/19.  Pt stated that  she does see Dr. Winters as her PCP. Pt stated that she has  not been on any MD prescribed RX's PTA.   Informed pt that CCP will review her D/C medciation to assess if there are  any assistance programs to help reduce cost.          Destination      No service coordination in this encounter.      Durable Medical Equipment      No service coordination in this encounter.      Dialysis/Infusion      No service coordination in this encounter.      Home Medical Care      No service coordination in this encounter.      Community Resources      No service coordination in this encounter.          Demographic Summary     Row Name 03/06/19 1105       General Information    Admission Type  inpatient    Arrived From  home    Referral Source  admission list    Preferred Language  English     Used During This Interaction  no        Functional Status     Row Name 03/06/19 1105       Functional Status    Usual Activity Tolerance  moderate    Current Activity Tolerance  moderate       Functional Status, IADL    Medications  independent    Meal Preparation  independent    Housekeeping  independent    Laundry  independent    Shopping  independent       Mental Status Summary    Recent Changes in Mental Status/Cognitive Functioning  no changes        Psychosocial    No documentation.       Abuse/Neglect    No documentation.       Legal    No documentation.       Substance Abuse    No documentation.       Patient Forms    No documentation.           MAYKEL Santacruz

## 2019-03-06 NOTE — PLAN OF CARE
Problem: Patient Care Overview  Goal: Plan of Care Review  Outcome: Ongoing (interventions implemented as appropriate)   03/06/19 0631   Coping/Psychosocial   Plan of Care Reviewed With patient   OTHER   Outcome Summary Pt diuresing well with Lasix from previous shift, heart rate in 50's, no distress noticed, I will continue to monitor.   Plan of Care Review   Progress improving     Goal: Individualization and Mutuality  Outcome: Ongoing (interventions implemented as appropriate)    Goal: Discharge Needs Assessment  Outcome: Ongoing (interventions implemented as appropriate)    Goal: Interprofessional Rounds/Family Conf  Outcome: Ongoing (interventions implemented as appropriate)      Problem: Skin Injury Risk (Adult)  Goal: Identify Related Risk Factors and Signs and Symptoms  Outcome: Ongoing (interventions implemented as appropriate)    Goal: Skin Health and Integrity  Outcome: Ongoing (interventions implemented as appropriate)      Problem: Pain, Acute (Adult)  Goal: Identify Related Risk Factors and Signs and Symptoms  Outcome: Ongoing (interventions implemented as appropriate)    Goal: Acceptable Pain Control/Comfort Level  Outcome: Ongoing (interventions implemented as appropriate)

## 2019-03-06 NOTE — CONSULTS
Patient Name: Joan Almanza  :1955  64 y.o.    Date of Admission: 3/4/2019  Date of Consultation:  19  Encounter Provider: Stephane Maldonado III, MD  Place of Service: [facility]  Referring Provider: Alice Simmons MD  Patient Care Team:  Juarez Winters MD as PCP - General (Family Medicine)      Chief complaint: bradycardia    History of Present Illness:  This is a 64-year-old female who presented to the emergency room on  with diplopia.  Imaging demonstrated a left medial fossa lesion with invasion of the cavernous sinus.  She has been seen by neurosurgery.    On the day of admission she was somewhat hypertensive and was started on beta-blockade.  We have been asked to see her today because of bradycardia.  Heart rate is been in the 50s.    She has been on metoprolol in the past, but it had been discontinued previously because she started having episodes of bradycardia.    She denies any symptoms.  No chest pain or chest discomfort.  No shortness of breath.  The only thing that is bothering her is the double vision.  She denies any lower extremity edema.  No orthopnea or PND.    This patient has no known cardiac history.  This patient has no history of coronary artery disease, congestive heart failure, rheumatic fever, rheumatic heart disease, congenital heart disease or heart murmur.  This patient has never required invasive cardiovascular evaluation.          Past Medical History:   Diagnosis Date   • Morbid obesity (CMS/HCC)    • Ovarian cyst    • Tachycardia        Past Surgical History:   Procedure Laterality Date   • ADENOIDECTOMY     • CHOLECYSTECTOMY     • OVARY SURGERY Right    • TONSILLECTOMY           Prior to Admission medications    Medication Sig Start Date End Date Taking? Authorizing Provider   cholecalciferol (VITAMIN D3) 1000 units tablet Take 3,000 Units by mouth Daily.   Yes Provider, MD Isacc   ibuprofen (ADVIL,MOTRIN) 200  MG tablet Take 400 mg by mouth Every 6 (Six) Hours As Needed for Mild Pain .   Yes Provider, MD Isacc   omeprazole (priLOSEC) 20 MG capsule Take 20 mg by mouth As Needed.   Yes Provider, MD Isacc   methylPREDNISolone (MEDROL, ANEESH,) 4 MG tablet Take as directed on package instructions. 3/5/19   Elisabet Skinner APRN       No Known Allergies    Social History     Socioeconomic History   • Marital status:      Spouse name: Not on file   • Number of children: Not on file   • Years of education: Not on file   • Highest education level: Not on file   Tobacco Use   • Smoking status: Never Smoker   Substance and Sexual Activity   • Alcohol use: No     Frequency: Never   • Drug use: No   • Sexual activity: Defer       History reviewed. No pertinent family history.    REVIEW OF SYSTEMS:   All systems reviewed.  Pertinent positives identified in HPI.  All other systems are negative.      Objective:     Vitals:    03/05/19 2253 03/05/19 2303 03/06/19 0700 03/06/19 1300   BP: 149/80  147/97 164/80   BP Location:   Left arm Left arm   Patient Position:   Lying Sitting   Pulse: 51  58    Resp:   18 18   Temp:  97.5 °F (36.4 °C) 98 °F (36.7 °C) 98.6 °F (37 °C)   TempSrc:  Oral Oral Oral   SpO2:       Weight:       Height:         Body mass index is 49.38 kg/m².    Physical Exam:  General Appearance:    Alert, cooperative, in no acute distress   Head:    Normocephalic, without obvious abnormality, atraumatic   Eyes:            Lids and lashes normal, conjunctivae and sclerae normal, no   icterus, no pallor, corneas clear, PERRLA   Ears:    Ears appear intact with no abnormalities noted   Throat:   No oral lesions, no thrush, oral mucosa moist   Neck:   No adenopathy, supple, trachea midline, no thyromegaly, no   carotid bruit, no JVD   Back:     No kyphosis present, no scoliosis present, no skin lesions, erythema or scars, no tenderness to percussion or palpation, range of motion normal   Lungs:     Clear to  auscultation,respirations regular, even and unlabored    Heart:    Regular rhythm and normal rate, normal S1 and S2, no murmur, no gallop, no rub, no click   Chest Wall:    No abnormalities observed   Abdomen:     Normal bowel sounds, no masses, no organomegaly, soft        non-tender, non-distended, no guarding, no rebound  tenderness   Extremities:   Moves all extremities well, no edema, no cyanosis, no redness   Pulses:   Pulses palpable and equal bilaterally. Normal radial, carotid, femoral, dorsalis pedis and posterior tibial pulses bilaterally. Normal abdominal aorta   Skin:  Psychiatric:   No bleeding, bruising or rash    Alert and oriented x 3, normal mood and affect         Lab Review:     Results from last 7 days   Lab Units 03/06/19  1040 03/05/19  0607   SODIUM mmol/L 139 139   POTASSIUM mmol/L 4.9 3.9   CHLORIDE mmol/L 100 103   CO2 mmol/L 26.8 24.7   BUN mg/dL 20 10   CREATININE mg/dL 0.98 0.83   CALCIUM mg/dL 13.3* 13.0*   BILIRUBIN mg/dL  --  0.6   ALK PHOS U/L  --  114   ALT (SGPT) U/L  --  12   AST (SGOT) U/L  --  11   GLUCOSE mg/dL 115* 169*         Results from last 7 days   Lab Units 03/05/19  0607   WBC 10*3/mm3 9.40   HEMOGLOBIN g/dL 15.2   HEMATOCRIT % 44.9   PLATELETS 10*3/mm3 228     Results from last 7 days   Lab Units 03/05/19  0607   INR  1.04     Results from last 7 days   Lab Units 03/05/19  0607   MAGNESIUM mg/dL 1.8                   I personally viewed and interpreted the patient's EKG/Telemetry data.      Current Facility-Administered Medications:   •  acetaminophen (TYLENOL) tablet 650 mg, 650 mg, Oral, Q4H PRN, Alice Simmons MD, 650 mg at 03/06/19 0855  •  amLODIPine (NORVASC) tablet 2.5 mg, 2.5 mg, Oral, Q24H, Surjit Ellsworth MD, 2.5 mg at 03/06/19 1335  •  dexamethasone (DECADRON) injection 4 mg, 4 mg, Intravenous, Q6H, Elisabet Skinner APRN, 4 mg at 03/06/19 1330  •  famotidine (PEPCID) tablet 20 mg, 20 mg, Oral, BID, Elisabet Skinner, APRN, 20 mg at 03/06/19 0800  •   furosemide (LASIX) injection 40 mg, 40 mg, Intravenous, Daily, Ty Lofton MD, 40 mg at 03/06/19 0847  •  HYDROcodone-acetaminophen (NORCO) 7.5-325 MG per tablet 1 tablet, 1 tablet, Oral, Q4H PRN, Surjit Ellsworth MD, 1 tablet at 03/05/19 2254  •  HYDROmorphone (DILAUDID) injection 0.5 mg, 0.5 mg, Intravenous, Q2H PRN **AND** naloxone (NARCAN) injection 0.4 mg, 0.4 mg, Intravenous, Q5 Min PRN, Alice Simmons MD  •  nitroglycerin (NITROSTAT) SL tablet 0.4 mg, 0.4 mg, Sublingual, Q5 Min PRN, Alice Simmons MD  •  ondansetron (ZOFRAN) injection 4 mg, 4 mg, Intravenous, Q6H PRN, Alice Simmons MD  •  [COMPLETED] Insert peripheral IV, , , Once **AND** sodium chloride 0.9 % flush 10 mL, 10 mL, Intravenous, PRN, Bull Branch II, MD  •  sodium chloride 0.9 % flush 3 mL, 3 mL, Intravenous, Q12H, Alice Simmons MD, 3 mL at 03/06/19 0847  •  sodium chloride 0.9 % flush 3-10 mL, 3-10 mL, Intravenous, PRN, Alice Simmons MD  •  sodium chloride 0.9 % infusion, 125 mL/hr, Intravenous, Continuous, Ty Lofton MD, Last Rate: 125 mL/hr at 03/06/19 1330, 125 mL/hr at 03/06/19 1330    Assessment and Plan:       Active Hospital Problems    Diagnosis  POA   • **Diplopia [H53.2]  Unknown   • Cerebral edema (CMS/HCC) [G93.6]  Yes   • Meningioma (CMS/HCC) [D32.9]  Yes   • AVF (arteriovenous fistula) (CMS/HCC) [I77.0]  Yes   • 6th nerve palsy, 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     This patient has asymptomatic bradycardia.  Metoprolol has been discontinued.  She has had problems with bradycardia in the past with metoprolol.  Additionally, the intracranial lesion could contribute somewhat to the bradycardia as well.  At this point there is no evidence of any cardiac pathology.  We will follow with you.    Stephane Maldonado III, MD  03/06/19  2:38 PM

## 2019-03-07 PROBLEM — E83.52 HYPERCALCEMIA: Status: ACTIVE | Noted: 2019-03-07

## 2019-03-07 PROBLEM — E21.3 HYPERPARATHYROIDISM: Status: ACTIVE | Noted: 2019-03-07

## 2019-03-07 LAB
IGA SERPL-MCNC: 614 MG/DL (ref 87–352)
IGG SERPL-MCNC: 1180 MG/DL (ref 700–1600)
IGM SERPL-MCNC: 84 MG/DL (ref 26–217)
KAPPA LC SERPL-MCNC: 18 MG/L (ref 3.3–19.4)
KAPPA LC/LAMBDA SER: 1.45 {RATIO} (ref 0.26–1.65)
LAMBDA LC FREE SERPL-MCNC: 12.4 MG/L (ref 5.7–26.3)
PROT PATTERN SERPL IFE-IMP: ABNORMAL

## 2019-03-07 PROCEDURE — 25010000002 DEXAMETHASONE PER 1 MG: Performed by: NURSE PRACTITIONER

## 2019-03-07 PROCEDURE — 25010000002 FUROSEMIDE PER 20 MG: Performed by: INTERNAL MEDICINE

## 2019-03-07 PROCEDURE — 99232 SBSQ HOSP IP/OBS MODERATE 35: CPT | Performed by: NURSE PRACTITIONER

## 2019-03-07 RX ORDER — DOCUSATE SODIUM 100 MG/1
100 CAPSULE, LIQUID FILLED ORAL 2 TIMES DAILY
Status: DISCONTINUED | OUTPATIENT
Start: 2019-03-07 | End: 2019-03-16

## 2019-03-07 RX ORDER — CALCITONIN SALMON 200 [USP'U]/ML
4 INJECTION, SOLUTION INTRAMUSCULAR; SUBCUTANEOUS EVERY 12 HOURS
Status: DISPENSED | OUTPATIENT
Start: 2019-03-07 | End: 2019-03-09

## 2019-03-07 RX ORDER — AMLODIPINE BESYLATE 5 MG/1
5 TABLET ORAL
Status: DISCONTINUED | OUTPATIENT
Start: 2019-03-08 | End: 2019-03-08

## 2019-03-07 RX ADMIN — AMLODIPINE BESYLATE 2.5 MG: 2.5 TABLET ORAL at 08:58

## 2019-03-07 RX ADMIN — FAMOTIDINE 20 MG: 20 TABLET, FILM COATED ORAL at 08:58

## 2019-03-07 RX ADMIN — DEXAMETHASONE SODIUM PHOSPHATE 4 MG: 4 INJECTION, SOLUTION INTRAMUSCULAR; INTRAVENOUS at 06:39

## 2019-03-07 RX ADMIN — SODIUM CHLORIDE, PRESERVATIVE FREE 3 ML: 5 INJECTION INTRAVENOUS at 08:59

## 2019-03-07 RX ADMIN — SODIUM CHLORIDE 125 ML/HR: 9 INJECTION, SOLUTION INTRAVENOUS at 03:50

## 2019-03-07 RX ADMIN — FUROSEMIDE 40 MG: 10 INJECTION, SOLUTION INTRAMUSCULAR; INTRAVENOUS at 08:59

## 2019-03-07 RX ADMIN — DEXAMETHASONE SODIUM PHOSPHATE 4 MG: 4 INJECTION, SOLUTION INTRAMUSCULAR; INTRAVENOUS at 00:21

## 2019-03-07 RX ADMIN — DEXAMETHASONE SODIUM PHOSPHATE 4 MG: 4 INJECTION, SOLUTION INTRAMUSCULAR; INTRAVENOUS at 23:53

## 2019-03-07 RX ADMIN — CALCITONIN SALMON 508 UNITS: 200 INJECTION, SOLUTION INTRAMUSCULAR; SUBCUTANEOUS at 22:22

## 2019-03-07 RX ADMIN — SODIUM CHLORIDE 175 ML/HR: 9 INJECTION, SOLUTION INTRAVENOUS at 19:28

## 2019-03-07 RX ADMIN — DOCUSATE SODIUM 100 MG: 100 CAPSULE, LIQUID FILLED ORAL at 22:24

## 2019-03-07 RX ADMIN — FAMOTIDINE 20 MG: 20 TABLET, FILM COATED ORAL at 22:23

## 2019-03-07 RX ADMIN — DEXAMETHASONE SODIUM PHOSPHATE 4 MG: 4 INJECTION, SOLUTION INTRAMUSCULAR; INTRAVENOUS at 11:51

## 2019-03-07 RX ADMIN — SODIUM CHLORIDE, PRESERVATIVE FREE 3 ML: 5 INJECTION INTRAVENOUS at 22:24

## 2019-03-07 RX ADMIN — DEXAMETHASONE SODIUM PHOSPHATE 4 MG: 4 INJECTION, SOLUTION INTRAMUSCULAR; INTRAVENOUS at 18:22

## 2019-03-07 NOTE — PROGRESS NOTES
"DAILY PROGRESS NOTE  [facility]    Patient Identification:  Name: Joan Almanza  Age: 64 y.o.  Sex: female  :  1955  MRN: [id number]         Primary Care Physician: Juarez Winters MD    Subjective:  Interval History:She is still having diplopia and elevated calcium.    Objective:    Scheduled Meds:  [START ON 3/8/2019] amLODIPine 5 mg Oral Q24H   dexamethasone 4 mg Intravenous Q6H   famotidine 20 mg Oral BID   furosemide 40 mg Intravenous Daily   sodium chloride 3 mL Intravenous Q12H     Continuous Infusions:  sodium chloride 125 mL/hr Last Rate: Stopped (19 1200)       Vital signs in last 24 hours:  Temp:  [97.2 °F (36.2 °C)-97.8 °F (36.6 °C)] 97.6 °F (36.4 °C)  Heart Rate:  [51-59] 54  Resp:  [18] 18  BP: (141-164)/() 142/93    Intake/Output:    Intake/Output Summary (Last 24 hours) at 3/7/2019 1525  Last data filed at 3/7/2019 1100  Gross per 24 hour   Intake 480 ml   Output 3300 ml   Net -2820 ml       Exam:  /93 (BP Location: Left arm, Patient Position: Sitting)   Pulse 54   Temp 97.6 °F (36.4 °C) (Oral)   Resp 18   Ht 157.5 cm (62\")   Wt 127 kg (279 lb 1.6 oz)   SpO2 96%   BMI 51.05 kg/m²     General Appearance:    Alert, cooperative, no distress   Head:    Normocephalic, without obvious abnormality, atraumatic   Eyes:       Throat:   Lips, tongue, gums normal   Neck:   Supple, symmetrical, trachea midline, no JVD   Lungs:     Clear to auscultation bilaterally, respirations unlabored   Chest Wall:    No tenderness or deformity    Heart:    Regular rate and rhythm, S1 and S2 normal, no murmur,no  Rub or gallop   Abdomen:     Soft, non-tender, bowel sounds active, no masses, no organomegaly    Extremities:   Extremities normal, atraumatic, no cyanosis or edema   Pulses:      Skin:   Skin is warm and dry,  no rashes or palpable lesions   Neurologic:   no focal deficits noted      Lab Results (last 72 hours)     Procedure Component Value Units Date/Time    " Immunofixation, Serum [[id number]]  (Abnormal) Collected:  03/05/19 1634    Specimen:  Blood Updated:  03/07/19 1515     Immunofixation Result, Serum Comment:     Comment: Presence of monoclonal protein is unclear at this time. Suggest  repeat in 3 to 6 months if clinically indicated.        IgG 1180 mg/dL      IgA 614 mg/dL      IgM 84 mg/dL     Narrative:       Performed at:  [address] [place] [address]  [id number]  : Huseyin Calderon PhD, Phone:  [phone]    Basic Metabolic Panel [[id number]]  (Abnormal) Collected:  03/06/19 1040    Specimen:  Blood Updated:  03/06/19 1138     Glucose 115 mg/dL      BUN 20 mg/dL      Creatinine 0.98 mg/dL      Sodium 139 mmol/L      Potassium 4.9 mmol/L      Chloride 100 mmol/L      CO2 26.8 mmol/L      Calcium 13.3 mg/dL      eGFR Non African Amer 57 mL/min/1.73      BUN/Creatinine Ratio 20.4     Anion Gap 12.2 mmol/L     Narrative:       GFR Normal >60  Chronic Kidney Disease <60  Kidney Failure <15    Vitamin D 25 Hydroxy [[id number]]  (Normal) Collected:  03/05/19 1634    Specimen:  Blood Updated:  03/05/19 1742     25 Hydroxy, Vitamin D 36.2 ng/ml     Narrative:       Reference Range for Total Vitamin D 25(OH)     Deficiency <20.0 ng/mL   Insufficiency 21-29 ng/mL   Sufficiency  ng/mL  Toxicity >100 ng/ml    PTH, Intact [[id number]]  (Abnormal) Collected:  03/05/19 1634    Specimen:  Blood Updated:  03/05/19 1737     PTH, Intact 172.0 pg/mL     Immunofixation, Urine - [[id number]] Collected:  03/05/19 1722    Specimen:  Urine Updated:  03/05/19 1727    Immunoglobulin Free LT Chains Blood [[id number]] Collected:  03/05/19 1634    Specimen:  Blood Updated:  03/05/19 1657    PTH-related Peptide [[id number]] Collected:  03/05/19 1634    Specimen:  Blood Updated:  03/05/19 1657    Hemoglobin A1c [[id number]]  (Normal) Collected:  03/05/19 0607    Specimen:  Blood Updated:  03/05/19 0847     Hemoglobin A1C 5.00 %     Narrative:       Hemoglobin  A1C Ranges:    Increased Risk for Diabetes  5.7% to 6.4%  Diabetes                     >= 6.5%  Diabetic Goal                < 7.0%    Comprehensive Metabolic Panel [[id number]]  (Abnormal) Collected:  03/05/19 0607    Specimen:  Blood Updated:  03/05/19 0658     Glucose 169 mg/dL      BUN 10 mg/dL      Creatinine 0.83 mg/dL      Sodium 139 mmol/L      Potassium 3.9 mmol/L      Chloride 103 mmol/L      CO2 24.7 mmol/L      Calcium 13.0 mg/dL      Total Protein 7.9 g/dL      Albumin 4.10 g/dL      ALT (SGPT) 12 U/L      AST (SGOT) 11 U/L      Alkaline Phosphatase 114 U/L      Total Bilirubin 0.6 mg/dL      eGFR Non African Amer 69 mL/min/1.73      Globulin 3.8 gm/dL      A/G Ratio 1.1 g/dL      BUN/Creatinine Ratio 12.0     Anion Gap 11.3 mmol/L     Narrative:       GFR Normal >60  Chronic Kidney Disease <60  Kidney Failure <15    Magnesium [[id number]]  (Normal) Collected:  03/05/19 0607    Specimen:  Blood Updated:  03/05/19 0652     Magnesium 1.8 mg/dL     Protime-INR [[id number]]  (Normal) Collected:  03/05/19 0607    Specimen:  Blood Updated:  03/05/19 0645     Protime 13.4 Seconds      INR 1.04    CBC Auto Differential [[id number]]  (Abnormal) Collected:  03/05/19 0607    Specimen:  Blood Updated:  03/05/19 0628     WBC 9.40 10*3/mm3      RBC 5.19 10*6/mm3      Hemoglobin 15.2 g/dL      Hematocrit 44.9 %      MCV 86.5 fL      MCH 29.3 pg      MCHC 33.9 g/dL      RDW 13.6 %      RDW-SD 42.4 fl      MPV 9.9 fL      Platelets 228 10*3/mm3      Neutrophil % 89.3 %      Lymphocyte % 7.3 %      Monocyte % 1.9 %      Eosinophil % 0.0 %      Basophil % 0.1 %      Immature Grans % 1.4 %      Neutrophils, Absolute 8.39 10*3/mm3      Lymphocytes, Absolute 0.69 10*3/mm3      Monocytes, Absolute 0.18 10*3/mm3      Eosinophils, Absolute 0.00 10*3/mm3      Basophils, Absolute 0.01 10*3/mm3      Immature Grans, Absolute 0.13 10*3/mm3      nRBC 0.0 /100 WBC         Data Review:  Results from last 7 days   Lab Units 03/06/19  1048  03/05/19  0607 03/04/19  1151   SODIUM mmol/L 139 139 143   POTASSIUM mmol/L 4.9 3.9 4.0   CHLORIDE mmol/L 100 103 106   CO2 mmol/L 26.8 24.7 24.7   BUN mg/dL 20 10 9   CREATININE mg/dL 0.98 0.83 0.79   GLUCOSE mg/dL 115* 169* 104*   CALCIUM mg/dL 13.3* 13.0* 12.3*     Results from last 7 days   Lab Units 03/05/19  0607 03/04/19  1151   WBC 10*3/mm3 9.40 9.63   HEMOGLOBIN g/dL 15.2 15.3   HEMATOCRIT % 44.9 45.9   PLATELETS 10*3/mm3 228 208     Results from last 7 days   Lab Units 03/04/19  1151   TSH mIU/mL 1.310   FREE T4 ng/dL 1.36     Results from last 7 days   Lab Units 03/05/19  0607   HEMOGLOBIN A1C % 5.00     No results found for: TROPONINT      Results from last 7 days   Lab Units 03/05/19  0607   ALK PHOS U/L 114   BILIRUBIN mg/dL 0.6   ALT (SGPT) U/L 12   AST (SGOT) U/L 11     Results from last 7 days   Lab Units 03/04/19  1151   TSH mIU/mL 1.310   FREE T4 ng/dL 1.36     Results from last 7 days   Lab Units 03/05/19  0607   HEMOGLOBIN A1C % 5.00     No results found for: POCGLU  Results from last 7 days   Lab Units 03/05/19  0607   INR  1.04       Past Medical History:   Diagnosis Date   • Morbid obesity (CMS/HCC)    • Ovarian cyst    • Tachycardia        Assessment:  Active Hospital Problems    Diagnosis  POA   • **Diplopia [H53.2]  Unknown   • Hypercalcemia [E83.52]  Unknown   • Hyperparathyroidism (CMS/HCC) [E21.3]  Unknown   • Cerebral edema (CMS/HCC) [G93.6]  Yes   • Meningioma (CMS/HCC) [D32.9]  Yes   • AVF (arteriovenous fistula) (CMS/HCC) [I77.0]  Yes   • 6th nerve palsy, 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       Plan:  Continue with IV fluid hydration . Follow up labs. As per nephrology and neurosurgery.    Pranav Galindo MD  3/7/2019  3:25 PM

## 2019-03-07 NOTE — PLAN OF CARE
Problem: Patient Care Overview  Goal: Plan of Care Review  Outcome: Ongoing (interventions implemented as appropriate)   03/07/19 1459 03/07/19 1701   Coping/Psychosocial   Plan of Care Reviewed With patient --    OTHER   Outcome Summary --  VSS. Able to shower per MD. New IV. Continue meds. CTM    Plan of Care Review   Progress --  no change     Goal: Individualization and Mutuality  Outcome: Ongoing (interventions implemented as appropriate)    Goal: Discharge Needs Assessment  Outcome: Ongoing (interventions implemented as appropriate)    Goal: Interprofessional Rounds/Family Conf  Outcome: Ongoing (interventions implemented as appropriate)      Problem: Skin Injury Risk (Adult)  Goal: Identify Related Risk Factors and Signs and Symptoms  Outcome: Ongoing (interventions implemented as appropriate)    Goal: Skin Health and Integrity  Outcome: Ongoing (interventions implemented as appropriate)      Problem: Pain, Acute (Adult)  Goal: Identify Related Risk Factors and Signs and Symptoms  Outcome: Ongoing (interventions implemented as appropriate)    Goal: Acceptable Pain Control/Comfort Level  Outcome: Ongoing (interventions implemented as appropriate)

## 2019-03-07 NOTE — PROGRESS NOTES
[facility]     LOS: 2 days     Subjective:    CC:Headache; Eye Pain; and Diplopia    DIET:  Diet Order   Procedures   • Diet Regular     No chest pain and no soa    Objective:    Vital Signs:  Temp:  [97.5 °F (36.4 °C)-98.6 °F (37 °C)] 97.5 °F (36.4 °C)  Heart Rate:  [51-59] 59  Resp:  [18] 18  BP: (147-164)/() 164/102       on   ;   Device (Oxygen Therapy): room air  Body mass index is 49.38 kg/m².    Physical Exam   Constitutional: She appears well-developed and well-nourished.   HENT:   Head: Normocephalic and atraumatic.   Cardiovascular: Exam reveals no friction rub.   No murmur heard.  Pulmonary/Chest: Effort normal and breath sounds normal.   Abdominal: Soft. Bowel sounds are normal. She exhibits no distension. There is no tenderness.   Neurological: She is alert.   Skin: Skin is warm and dry.       Results Review:    Glucose   Date Value Ref Range Status   03/06/2019 115 (H) 65 - 99 mg/dL Final   03/05/2019 169 (H) 65 - 99 mg/dL Final   03/04/2019 104 (H) 65 - 99 mg/dL Final     Results from last 7 days   Lab Units 03/05/19  0607   WBC 10*3/mm3 9.40   HEMOGLOBIN g/dL 15.2   HEMATOCRIT % 44.9   PLATELETS 10*3/mm3 228     Results from last 7 days   Lab Units 03/06/19  1040 03/05/19  0607   SODIUM mmol/L 139 139   POTASSIUM mmol/L 4.9 3.9   CHLORIDE mmol/L 100 103   CO2 mmol/L 26.8 24.7   BUN mg/dL 20 10   CREATININE mg/dL 0.98 0.83   CALCIUM mg/dL 13.3* 13.0*   BILIRUBIN mg/dL  --  0.6   ALK PHOS U/L  --  114   ALT (SGPT) U/L  --  12   AST (SGOT) U/L  --  11   GLUCOSE mg/dL 115* 169*     Results from last 7 days   Lab Units 03/05/19  0607   INR  1.04     Results from last 7 days   Lab Units 03/05/19  0607   MAGNESIUM mg/dL 1.8         Cultures:       I have reviewed daily medications and changes in CPOE    Scheduled meds    amLODIPine 2.5 mg Oral Q24H   dexamethasone 4 mg Intravenous Q6H   famotidine 20 mg Oral BID   furosemide 40 mg Intravenous Daily   sodium chloride 3 mL  Intravenous Q12H         sodium chloride 125 mL/hr Last Rate: 125 mL/hr (03/06/19 1330)     PRN meds  •  acetaminophen  •  HYDROcodone-acetaminophen  •  HYDROmorphone **AND** naloxone  •  nitroglycerin  •  ondansetron  •  [COMPLETED] Insert peripheral IV **AND** sodium chloride  •  sodium chloride        Diplopia    Cerebral edema (CMS/HCC)    Meningioma (CMS/HCC)    AVF (arteriovenous fistula) (CMS/HCC)    6th nerve palsy, left        Assessment/Plan:  Hypercalcemia from hyperparathyroidism-stop thiazide  -fluids and diuretics    Diplopia secondary to intracranial meningioma with cerebral edema and 6th nerve palsy-patient has been seen by neurosurgery service and has been started on Decadron.    -d/w Lisa Lester  -medrol dose pack on discharge  -4-6 week follow outpatient  -no driving    Intracranial dural AV fistula   -follow outpatient with [facility]  -keep bp less than 150 systolic  -HCTZ (but could make the calcium worse, so may need to stop) and metoprolol (pauses on monitor so stopped)  -starting norvasc    Morbid obesity-nutritional consult.    Hypertension-as above         DVT PPX: scd, no lovenox because of risk of bleeding        Surjit Ellsworth MD  03/06/19  8:31 PM

## 2019-03-07 NOTE — PROGRESS NOTES
"    Patient Name: Joan Almanza  :1955  64 y.o.      Patient Care Team:  Juarez Winters MD as PCP - General (Family Medicine)    Chief Complaint: follow up bradycardia, HTN    Interval History: HR in th 50's. Asymptomatic. Metoprolol stopped and started on amlodipine. BP remains elevated but better.        Objective   Vital Signs  Temp:  [97.2 °F (36.2 °C)-97.8 °F (36.6 °C)] 97.8 °F (36.6 °C)  Heart Rate:  [51-59] 51  Resp:  [18] 18  BP: (141-164)/() 150/72    Intake/Output Summary (Last 24 hours) at 3/7/2019 1301  Last data filed at 3/7/2019 1100  Gross per 24 hour   Intake 600 ml   Output 3300 ml   Net -2700 ml     Flowsheet Rows      First Filed Value   Admission Height  157.5 cm (62\") Documented at 2019 1050   Admission Weight  122 kg (270 lb) Documented at 2019 1050          Physical Exam:   General Appearance:    Alert, cooperative, in no acute distress   Lungs:     Clear to auscultation.  Normal respiratory effort and rate.      Heart:    Regular rhythm and normal rate, normal S1 and S2, no murmurs, gallops or rubs.     Chest Wall:    No abnormalities observed   Abdomen:     Soft, nontender, positive bowel sounds.     Extremities:   no cyanosis, clubbing or edema.  No marked joint deformities.  Adequate musculoskeletal strength.       Results Review:    Results from last 7 days   Lab Units 19  1040   SODIUM mmol/L 139   POTASSIUM mmol/L 4.9   CHLORIDE mmol/L 100   CO2 mmol/L 26.8   BUN mg/dL 20   CREATININE mg/dL 0.98   GLUCOSE mg/dL 115*   CALCIUM mg/dL 13.3*         Results from last 7 days   Lab Units 19  0607   WBC 10*3/mm3 9.40   HEMOGLOBIN g/dL 15.2   HEMATOCRIT % 44.9   PLATELETS 10*3/mm3 228     Results from last 7 days   Lab Units 19  0607   INR  1.04     Results from last 7 days   Lab Units 19  0607   MAGNESIUM mg/dL 1.8                   Medication Review:     amLODIPine 2.5 mg Oral Q24H   dexamethasone 4 mg Intravenous Q6H   famotidine 20 mg " Oral BID   furosemide 40 mg Intravenous Daily   sodium chloride 3 mL Intravenous Q12H          sodium chloride 125 mL/hr Last Rate: Stopped (03/07/19 1200)       Assessment/Plan   1. Asymptomatic bradycardia - metoprolol stopped and she was placed on amlodipine. Possible undiagnosed sleep apnea as well. May need outpatient study.   2.  HTN - improving. Still a little sonia. Per [facility] , goal <150. Will increase amlodipine and monitor response (will take 4-5 days to reach steady state).   3. Intracranial meningioma  - steroids.   4. intracranial dural AV fistula - control BP as above  5. Morbid obesity   6. Hypercalcemia     MILA Pool  [facility]  03/07/19  1:01 PM

## 2019-03-07 NOTE — PROGRESS NOTES
NEPHROLOGY PROGRESS NOTE    PATIENT IDENTIFICATION:   Name:  Joan Almanza      MRN:  [id number]     64 y.o.  female             Reason for visit: Hypercalcemia    SUBJECTIVE:   Seen and examined. No SOA or CP  OBJECTIVE:  Vitals:    03/06/19 2352 03/07/19 0500 03/07/19 0713 03/07/19 1434   BP: 141/90  150/72 142/93   BP Location: Left arm  Left arm Left arm   Patient Position: Lying  Sitting Sitting   Pulse: 53  51 54   Resp: 18  18 18   Temp: 97.2 °F (36.2 °C)  97.8 °F (36.6 °C) 97.6 °F (36.4 °C)   TempSrc: Oral  Oral Oral   SpO2:   95% 96%   Weight:  127 kg (279 lb 1.6 oz)     Height:               Body mass index is 51.05 kg/m².    Intake/Output Summary (Last 24 hours) at 3/7/2019 1713  Last data filed at 3/7/2019 1700  Gross per 24 hour   Intake 840 ml   Output 3000 ml   Net -2160 ml         Exam:  GEN:  No distress, appears stated age  EYES:   Anicteric sclera  ENT:    External ears/nose normal, MM are moist  NECK:  No adenopathy, JVP none  LUNGS: Normal chest on inspection; not labored  CV:  Normal S1S2, without murmur  ABD:  Non-tender, non-distended, no hepatosplenomegaly, +BS  EXT:  No edema; no cyanosis; clubbing    Scheduled meds:      [START ON 3/8/2019] amLODIPine 5 mg Oral Q24H   dexamethasone 4 mg Intravenous Q6H   famotidine 20 mg Oral BID   furosemide 40 mg Intravenous Daily   sodium chloride 3 mL Intravenous Q12H     IV meds:                          sodium chloride 125 mL/hr Last Rate: Stopped (03/07/19 1200)       Data Review:    Results from last 7 days   Lab Units 03/06/19  1040 03/05/19  0607 03/04/19  1151   SODIUM mmol/L 139 139 143   POTASSIUM mmol/L 4.9 3.9 4.0   CHLORIDE mmol/L 100 103 106   CO2 mmol/L 26.8 24.7 24.7   BUN mg/dL 20 10 9   CREATININE mg/dL 0.98 0.83 0.79   CALCIUM mg/dL 13.3* 13.0* 12.3*   BILIRUBIN mg/dL  --  0.6  --    ALK PHOS U/L  --  114  --    ALT (SGPT) U/L  --  12  --    AST (SGOT) U/L  --  11  --    GLUCOSE mg/dL 115* 169* 104*       Estimated Creatinine  Clearance: 74.1 mL/min (by C-G formula based on SCr of 0.98 mg/dL).    Results from last 7 days   Lab Units 03/05/19  0607   MAGNESIUM mg/dL 1.8       Results from last 7 days   Lab Units 03/05/19  0607 03/04/19  1151   WBC 10*3/mm3 9.40 9.63   HEMOGLOBIN g/dL 15.2 15.3   PLATELETS 10*3/mm3 228 208       Results from last 7 days   Lab Units 03/05/19  0607   INR  1.04             ASSESSMENT:     Diplopia    Cerebral edema (CMS/HCC)    Meningioma (CMS/HCC)    AVF (arteriovenous fistula) (CMS/HCC)    6th nerve palsy, left    Hypercalcemia    Hyperparathyroidism (CMS/HCC)        1. Hypercalcemia: Is a significant history of weight loss and intended the associated with her hypercalcemia.  I don't think her high calcium can be explained by being nonambulatory but being on thiazide can explain it.   high PTH. Pending parathyroid related peptide and free serum light chain.     2. Morbidly obese  3. Hypertension- Hold Thiazide  4. Intracranial meningioma with cerebral edema and 6th nerve palsy     Plan:    Start calcitonin   Work-up for hyperparathyroidism as an outpatient  Continue Lasix and IVF  Surveillance labs        Ty Lofton MD  3/7/2019    5:13 PM

## 2019-03-08 LAB
ANION GAP SERPL CALCULATED.3IONS-SCNC: 11.2 MMOL/L
BASOPHILS # BLD AUTO: 0.02 10*3/MM3 (ref 0–0.2)
BASOPHILS NFR BLD AUTO: 0.1 % (ref 0–1.5)
BUN BLD-MCNC: 21 MG/DL (ref 8–23)
BUN/CREAT SERPL: 31.8 (ref 7–25)
CALCIUM SPEC-SCNC: 11.6 MG/DL (ref 8.6–10.5)
CHLORIDE SERPL-SCNC: 106 MMOL/L (ref 98–107)
CO2 SERPL-SCNC: 23.8 MMOL/L (ref 22–29)
CREAT BLD-MCNC: 0.66 MG/DL (ref 0.57–1)
DEPRECATED RDW RBC AUTO: 44.5 FL (ref 37–54)
EOSINOPHIL # BLD AUTO: 0 10*3/MM3 (ref 0–0.4)
EOSINOPHIL NFR BLD AUTO: 0 % (ref 0.3–6.2)
ERYTHROCYTE [DISTWIDTH] IN BLOOD BY AUTOMATED COUNT: 13.9 % (ref 12.3–15.4)
GFR SERPL CREATININE-BSD FRML MDRD: 90 ML/MIN/1.73
GLUCOSE BLD-MCNC: 127 MG/DL (ref 65–99)
HCT VFR BLD AUTO: 43.3 % (ref 34–46.6)
HGB BLD-MCNC: 14 G/DL (ref 12–15.9)
IMM GRANULOCYTES # BLD AUTO: 0.13 10*3/MM3 (ref 0–0.05)
IMM GRANULOCYTES NFR BLD AUTO: 1 % (ref 0–0.5)
INTERPRETATION UR IFE-IMP: NORMAL
LYMPHOCYTES # BLD AUTO: 1 10*3/MM3 (ref 0.7–3.1)
LYMPHOCYTES NFR BLD AUTO: 7.4 % (ref 19.6–45.3)
MCH RBC QN AUTO: 28.9 PG (ref 26.6–33)
MCHC RBC AUTO-ENTMCNC: 32.3 G/DL (ref 31.5–35.7)
MCV RBC AUTO: 89.3 FL (ref 79–97)
MONOCYTES # BLD AUTO: 1.08 10*3/MM3 (ref 0.1–0.9)
MONOCYTES NFR BLD AUTO: 8 % (ref 5–12)
NEUTROPHILS # BLD AUTO: 11.31 10*3/MM3 (ref 1.4–7)
NEUTROPHILS NFR BLD AUTO: 83.5 % (ref 42.7–76)
NRBC BLD AUTO-RTO: 0 /100 WBC (ref 0–0)
PLATELET # BLD AUTO: 226 10*3/MM3 (ref 140–450)
PMV BLD AUTO: 9.9 FL (ref 6–12)
POTASSIUM BLD-SCNC: 3.4 MMOL/L (ref 3.5–5.2)
RBC # BLD AUTO: 4.85 10*6/MM3 (ref 3.77–5.28)
SODIUM BLD-SCNC: 141 MMOL/L (ref 136–145)
WBC NRBC COR # BLD: 13.54 10*3/MM3 (ref 3.4–10.8)

## 2019-03-08 PROCEDURE — 99232 SBSQ HOSP IP/OBS MODERATE 35: CPT | Performed by: NURSE PRACTITIONER

## 2019-03-08 PROCEDURE — 85025 COMPLETE CBC W/AUTO DIFF WBC: CPT | Performed by: HOSPITALIST

## 2019-03-08 PROCEDURE — 25010000002 FUROSEMIDE PER 20 MG: Performed by: INTERNAL MEDICINE

## 2019-03-08 PROCEDURE — 80048 BASIC METABOLIC PNL TOTAL CA: CPT | Performed by: HOSPITALIST

## 2019-03-08 PROCEDURE — 25010000002 DEXAMETHASONE PER 1 MG: Performed by: NURSE PRACTITIONER

## 2019-03-08 RX ORDER — HYDRALAZINE HYDROCHLORIDE 20 MG/ML
10 INJECTION INTRAMUSCULAR; INTRAVENOUS EVERY 6 HOURS PRN
Status: DISCONTINUED | OUTPATIENT
Start: 2019-03-08 | End: 2019-03-20 | Stop reason: HOSPADM

## 2019-03-08 RX ORDER — AMLODIPINE BESYLATE 10 MG/1
10 TABLET ORAL
Status: DISCONTINUED | OUTPATIENT
Start: 2019-03-09 | End: 2019-03-17

## 2019-03-08 RX ORDER — POTASSIUM CHLORIDE 750 MG/1
80 CAPSULE, EXTENDED RELEASE ORAL DAILY
Status: DISCONTINUED | OUTPATIENT
Start: 2019-03-08 | End: 2019-03-17

## 2019-03-08 RX ADMIN — POTASSIUM CHLORIDE 80 MEQ: 750 CAPSULE, EXTENDED RELEASE ORAL at 09:26

## 2019-03-08 RX ADMIN — FAMOTIDINE 20 MG: 20 TABLET, FILM COATED ORAL at 09:22

## 2019-03-08 RX ADMIN — SODIUM CHLORIDE 175 ML/HR: 9 INJECTION, SOLUTION INTRAVENOUS at 16:30

## 2019-03-08 RX ADMIN — DOCUSATE SODIUM 100 MG: 100 CAPSULE, LIQUID FILLED ORAL at 09:23

## 2019-03-08 RX ADMIN — DEXAMETHASONE SODIUM PHOSPHATE 4 MG: 4 INJECTION, SOLUTION INTRAMUSCULAR; INTRAVENOUS at 23:57

## 2019-03-08 RX ADMIN — SODIUM CHLORIDE 175 ML/HR: 9 INJECTION, SOLUTION INTRAVENOUS at 23:57

## 2019-03-08 RX ADMIN — CALCITONIN SALMON 508 UNITS: 200 INJECTION, SOLUTION INTRAMUSCULAR; SUBCUTANEOUS at 06:38

## 2019-03-08 RX ADMIN — SODIUM CHLORIDE, PRESERVATIVE FREE 3 ML: 5 INJECTION INTRAVENOUS at 21:47

## 2019-03-08 RX ADMIN — AMLODIPINE BESYLATE 5 MG: 5 TABLET ORAL at 09:22

## 2019-03-08 RX ADMIN — DEXAMETHASONE SODIUM PHOSPHATE 4 MG: 4 INJECTION, SOLUTION INTRAMUSCULAR; INTRAVENOUS at 06:38

## 2019-03-08 RX ADMIN — FAMOTIDINE 20 MG: 20 TABLET, FILM COATED ORAL at 21:47

## 2019-03-08 RX ADMIN — SODIUM CHLORIDE 175 ML/HR: 9 INJECTION, SOLUTION INTRAVENOUS at 01:19

## 2019-03-08 RX ADMIN — SODIUM CHLORIDE, PRESERVATIVE FREE 3 ML: 5 INJECTION INTRAVENOUS at 09:23

## 2019-03-08 RX ADMIN — SODIUM CHLORIDE 175 ML/HR: 9 INJECTION, SOLUTION INTRAVENOUS at 06:48

## 2019-03-08 RX ADMIN — DEXAMETHASONE SODIUM PHOSPHATE 4 MG: 4 INJECTION, SOLUTION INTRAMUSCULAR; INTRAVENOUS at 18:23

## 2019-03-08 RX ADMIN — DEXAMETHASONE SODIUM PHOSPHATE 4 MG: 4 INJECTION, SOLUTION INTRAMUSCULAR; INTRAVENOUS at 11:45

## 2019-03-08 RX ADMIN — FUROSEMIDE 40 MG: 10 INJECTION, SOLUTION INTRAMUSCULAR; INTRAVENOUS at 09:23

## 2019-03-08 NOTE — PROGRESS NOTES
NEPHROLOGY PROGRESS NOTE    PATIENT IDENTIFICATION:   Name:  Joan Almanza      MRN:  [id number]     64 y.o.  female             Reason for visit: Hypercalcemia    SUBJECTIVE:   Seen and examined. No SOA or CP. Naseous. Had 4 BM ysterday  OBJECTIVE:  Vitals:    03/07/19 1434 03/07/19 1900 03/07/19 2300 03/08/19 0738   BP: 142/93 173/98 177/95 162/84   BP Location: Left arm Left arm Left arm Right arm   Patient Position: Sitting Lying Lying Lying   Pulse: 54   50   Resp: 18 18 18 18   Temp: 97.6 °F (36.4 °C) 97.8 °F (36.6 °C) 98 °F (36.7 °C) 97.7 °F (36.5 °C)   TempSrc: Oral Oral Oral Oral   SpO2: 96% 96% 96%    Weight:       Height:               Body mass index is 51.05 kg/m².    Intake/Output Summary (Last 24 hours) at 3/8/2019 0852  Last data filed at 3/8/2019 0627  Gross per 24 hour   Intake 840 ml   Output 4020 ml   Net -3180 ml         Exam:  GEN:  No distress, appears stated age  EYES:   Anicteric sclera  ENT:    External ears/nose normal, MM are moist  NECK:  No adenopathy, JVP none  LUNGS: Normal chest on inspection; not labored  CV:  Normal S1S2, without murmur  ABD:  Non-tender, non-distended, no hepatosplenomegaly, +BS  EXT:  No edema; no cyanosis; clubbing    Scheduled meds:      amLODIPine 5 mg Oral Q24H   calcitonin 4 Units/kg Subcutaneous Q12H   dexamethasone 4 mg Intravenous Q6H   docusate sodium 100 mg Oral BID   famotidine 20 mg Oral BID   furosemide 40 mg Intravenous Daily   potassium chloride 80 mEq Oral Daily   sodium chloride 3 mL Intravenous Q12H     IV meds:                          sodium chloride 175 mL/hr Last Rate: 175 mL/hr (03/08/19 0648)       Data Review:    Results from last 7 days   Lab Units 03/08/19  0522 03/06/19  1040 03/05/19  0607   SODIUM mmol/L 141 139 139   POTASSIUM mmol/L 3.4* 4.9 3.9   CHLORIDE mmol/L 106 100 103   CO2 mmol/L 23.8 26.8 24.7   BUN mg/dL 21 20 10   CREATININE mg/dL 0.66 0.98 0.83   CALCIUM mg/dL 11.6* 13.3* 13.0*   BILIRUBIN mg/dL  --   --  0.6   ALK  PHOS U/L  --   --  114   ALT (SGPT) U/L  --   --  12   AST (SGOT) U/L  --   --  11   GLUCOSE mg/dL 127* 115* 169*       Estimated Creatinine Clearance: 110 mL/min (by C-G formula based on SCr of 0.66 mg/dL).    Results from last 7 days   Lab Units 03/05/19  0607   MAGNESIUM mg/dL 1.8       Results from last 7 days   Lab Units 03/08/19  0522 03/05/19  0607 03/04/19  1151   WBC 10*3/mm3 13.54* 9.40 9.63   HEMOGLOBIN g/dL 14.0 15.2 15.3   PLATELETS 10*3/mm3 226 228 208       Results from last 7 days   Lab Units 03/05/19  0607   INR  1.04             ASSESSMENT:     Diplopia    Cerebral edema (CMS/HCC)    Meningioma (CMS/HCC)    AVF (arteriovenous fistula) (CMS/HCC)    6th nerve palsy, left    Hypercalcemia    Hyperparathyroidism (CMS/HCC)        1. Hypercalcemia: Is a significant history of weight loss and intended the associated with her hypercalcemia.  I don't think her high calcium can be explained by being nonambulatory but being on thiazide can explain it.   high PTH. Pending parathyroid related peptide and free serum light chain.     2. Morbidly obese  3. Hypertension- Hold Thiazide  4. Intracranial meningioma with cerebral edema and 6th nerve palsy     Plan:    Continue calcitonin   Work-up for hyperparathyroidism as an outpatient  Continue Lasix and IVF for now  Replete potassium  Might need Sensipar till her sirtgery  Surveillance labs        Ty Lofton MD  3/8/2019    8:52 AM

## 2019-03-08 NOTE — PROGRESS NOTES
"    Patient Name: Joan Almanza  :1955  64 y.o.      Patient Care Team:  Juarez Winters MD as PCP - General (Family Medicine)    Chief Complaint: follow up bradycardia, HTN    Interval History: HR remains in the 50's. Asymptomatic. She had diarrhea and nausea this morning. She continues to have double vision.      Objective   Vital Signs  Temp:  [97.7 °F (36.5 °C)-98 °F (36.7 °C)] 97.7 °F (36.5 °C)  Heart Rate:  [50-74] 74  Resp:  [18] 18  BP: (162-177)/(76-98) 171/76    Intake/Output Summary (Last 24 hours) at 3/8/2019 1550  Last data filed at 3/8/2019 1300  Gross per 24 hour   Intake 720 ml   Output 1620 ml   Net -900 ml     Flowsheet Rows      First Filed Value   Admission Height  157.5 cm (62\") Documented at 2019 1050   Admission Weight  122 kg (270 lb) Documented at 2019 1050          Physical Exam:   General Appearance:    Alert, cooperative, in no acute distress   Lungs:     Clear to auscultation.  Normal respiratory effort and rate.      Heart:    Regular rhythm and normal rate, normal S1 and S2, no murmurs, gallops or rubs.     Chest Wall:    No abnormalities observed   Abdomen:     Soft, nontender, positive bowel sounds.     Extremities:   no cyanosis, clubbing or edema.  No marked joint deformities.  Adequate musculoskeletal strength.       Results Review:    Results from last 7 days   Lab Units 19  0522   SODIUM mmol/L 141   POTASSIUM mmol/L 3.4*   CHLORIDE mmol/L 106   CO2 mmol/L 23.8   BUN mg/dL 21   CREATININE mg/dL 0.66   GLUCOSE mg/dL 127*   CALCIUM mg/dL 11.6*         Results from last 7 days   Lab Units 19  0522   WBC 10*3/mm3 13.54*   HEMOGLOBIN g/dL 14.0   HEMATOCRIT % 43.3   PLATELETS 10*3/mm3 226     Results from last 7 days   Lab Units 19  0607   INR  1.04     Results from last 7 days   Lab Units 19  0607   MAGNESIUM mg/dL 1.8                   Medication Review:     amLODIPine 5 mg Oral Q24H   calcitonin 4 Units/kg Subcutaneous Q12H "   dexamethasone 4 mg Intravenous Q6H   docusate sodium 100 mg Oral BID   famotidine 20 mg Oral BID   furosemide 40 mg Intravenous Daily   potassium chloride 80 mEq Oral Daily   sodium chloride 3 mL Intravenous Q12H          sodium chloride 175 mL/hr Last Rate: 175 mL/hr (03/08/19 0648)       Assessment/Plan   1. Asymptomatic bradycardia - metoprolol stopped and she was placed on amlodipine. Possible undiagnosed sleep apnea as well. May need outpatient study.   2.  HTN - improving. Still a little high. Per [facility] , goal <150. Will increase amlodipine and monitor response (will take 4-5 days to reach steady state).   3. Intracranial meningioma  - steroids.   4. intracranial dural AV fistula - control BP as above  5. Morbid obesity   6. Hypercalcemia     Add as needed hydralazine. Increase amlodipine.      MILA Pool  [facility]  03/08/19  3:50 PM

## 2019-03-08 NOTE — PROGRESS NOTES
"DAILY PROGRESS NOTE  [facility]    Patient Identification:  Name: Joan Almanza  Age: 64 y.o.  Sex: female  :  1955  MRN: [id number]         Primary Care Physician: Juarez Winters MD    Subjective:  Interval History:She is still having diplopia and elevated calcium.    Objective:    Scheduled Meds:    amLODIPine 5 mg Oral Q24H   calcitonin 4 Units/kg Subcutaneous Q12H   dexamethasone 4 mg Intravenous Q6H   docusate sodium 100 mg Oral BID   famotidine 20 mg Oral BID   furosemide 40 mg Intravenous Daily   potassium chloride 80 mEq Oral Daily   sodium chloride 3 mL Intravenous Q12H     Continuous Infusions:    sodium chloride 175 mL/hr Last Rate: 175 mL/hr (19 0648)       Vital signs in last 24 hours:  Temp:  [97.6 °F (36.4 °C)-98 °F (36.7 °C)] 97.7 °F (36.5 °C)  Heart Rate:  [50-54] 50  Resp:  [18] 18  BP: (142-177)/(84-98) 162/84    Intake/Output:    Intake/Output Summary (Last 24 hours) at 3/8/2019 1257  Last data filed at 3/8/2019 0738  Gross per 24 hour   Intake 720 ml   Output 1620 ml   Net -900 ml       Exam:  /84 (BP Location: Right arm, Patient Position: Lying)   Pulse 50   Temp 97.7 °F (36.5 °C) (Oral)   Resp 18   Ht 157.5 cm (62\")   Wt 127 kg (279 lb 1.6 oz)   SpO2 96%   BMI 51.05 kg/m²     General Appearance:    Alert, cooperative, no distress   Head:    Normocephalic, without obvious abnormality, atraumatic   Eyes:       Throat:   Lips, tongue, gums normal   Neck:   Supple, symmetrical, trachea midline, no JVD   Lungs:     Clear to auscultation bilaterally, respirations unlabored   Chest Wall:    No tenderness or deformity    Heart:    Regular rate and rhythm, S1 and S2 normal, no murmur,no  Rub or gallop   Abdomen:     Soft, non-tender, bowel sounds active, no masses, no organomegaly    Extremities:   Extremities normal, atraumatic, no cyanosis or edema   Pulses:      Skin:   Skin is warm and dry,  no rashes or palpable lesions   Neurologic:   no focal " deficits noted      Lab Results (last 72 hours)     Procedure Component Value Units Date/Time    Immunofixation, Serum [[id number]]  (Abnormal) Collected:  03/05/19 1634    Specimen:  Blood Updated:  03/07/19 1515     Immunofixation Result, Serum Comment:     Comment: Presence of monoclonal protein is unclear at this time. Suggest  repeat in 3 to 6 months if clinically indicated.        IgG 1180 mg/dL      IgA 614 mg/dL      IgM 84 mg/dL     Narrative:       Performed at:  [address] [place] [address]  [id number]  : Huseyin Calderon PhD, Phone:  [phone]    Basic Metabolic Panel [[id number]]  (Abnormal) Collected:  03/06/19 1040    Specimen:  Blood Updated:  03/06/19 1138     Glucose 115 mg/dL      BUN 20 mg/dL      Creatinine 0.98 mg/dL      Sodium 139 mmol/L      Potassium 4.9 mmol/L      Chloride 100 mmol/L      CO2 26.8 mmol/L      Calcium 13.3 mg/dL      eGFR Non African Amer 57 mL/min/1.73      BUN/Creatinine Ratio 20.4     Anion Gap 12.2 mmol/L     Narrative:       GFR Normal >60  Chronic Kidney Disease <60  Kidney Failure <15    Vitamin D 25 Hydroxy [[id number]]  (Normal) Collected:  03/05/19 1634    Specimen:  Blood Updated:  03/05/19 1742     25 Hydroxy, Vitamin D 36.2 ng/ml     Narrative:       Reference Range for Total Vitamin D 25(OH)     Deficiency <20.0 ng/mL   Insufficiency 21-29 ng/mL   Sufficiency  ng/mL  Toxicity >100 ng/ml    PTH, Intact [[id number]]  (Abnormal) Collected:  03/05/19 1634    Specimen:  Blood Updated:  03/05/19 1737     PTH, Intact 172.0 pg/mL     Immunofixation, Urine - [[id number]] Collected:  03/05/19 1722    Specimen:  Urine Updated:  03/05/19 1727    Immunoglobulin Free LT Chains Blood [[id number]] Collected:  03/05/19 1634    Specimen:  Blood Updated:  03/05/19 1657    PTH-related Peptide [[id number]] Collected:  03/05/19 1634    Specimen:  Blood Updated:  03/05/19 1657    Hemoglobin A1c [[id number]]  (Normal) Collected:  03/05/19 0607     Specimen:  Blood Updated:  03/05/19 0847     Hemoglobin A1C 5.00 %     Narrative:       Hemoglobin A1C Ranges:    Increased Risk for Diabetes  5.7% to 6.4%  Diabetes                     >= 6.5%  Diabetic Goal                < 7.0%    Comprehensive Metabolic Panel [[id number]]  (Abnormal) Collected:  03/05/19 0607    Specimen:  Blood Updated:  03/05/19 0658     Glucose 169 mg/dL      BUN 10 mg/dL      Creatinine 0.83 mg/dL      Sodium 139 mmol/L      Potassium 3.9 mmol/L      Chloride 103 mmol/L      CO2 24.7 mmol/L      Calcium 13.0 mg/dL      Total Protein 7.9 g/dL      Albumin 4.10 g/dL      ALT (SGPT) 12 U/L      AST (SGOT) 11 U/L      Alkaline Phosphatase 114 U/L      Total Bilirubin 0.6 mg/dL      eGFR Non African Amer 69 mL/min/1.73      Globulin 3.8 gm/dL      A/G Ratio 1.1 g/dL      BUN/Creatinine Ratio 12.0     Anion Gap 11.3 mmol/L     Narrative:       GFR Normal >60  Chronic Kidney Disease <60  Kidney Failure <15    Magnesium [[id number]]  (Normal) Collected:  03/05/19 0607    Specimen:  Blood Updated:  03/05/19 0652     Magnesium 1.8 mg/dL     Protime-INR [[id number]]  (Normal) Collected:  03/05/19 0607    Specimen:  Blood Updated:  03/05/19 0645     Protime 13.4 Seconds      INR 1.04    CBC Auto Differential [[id number]]  (Abnormal) Collected:  03/05/19 0607    Specimen:  Blood Updated:  03/05/19 0628     WBC 9.40 10*3/mm3      RBC 5.19 10*6/mm3      Hemoglobin 15.2 g/dL      Hematocrit 44.9 %      MCV 86.5 fL      MCH 29.3 pg      MCHC 33.9 g/dL      RDW 13.6 %      RDW-SD 42.4 fl      MPV 9.9 fL      Platelets 228 10*3/mm3      Neutrophil % 89.3 %      Lymphocyte % 7.3 %      Monocyte % 1.9 %      Eosinophil % 0.0 %      Basophil % 0.1 %      Immature Grans % 1.4 %      Neutrophils, Absolute 8.39 10*3/mm3      Lymphocytes, Absolute 0.69 10*3/mm3      Monocytes, Absolute 0.18 10*3/mm3      Eosinophils, Absolute 0.00 10*3/mm3      Basophils, Absolute 0.01 10*3/mm3      Immature Grans, Absolute 0.13 10*3/mm3       nRBC 0.0 /100 WBC         Data Review:  Results from last 7 days   Lab Units 03/08/19  0522 03/06/19  1040 03/05/19  0607   SODIUM mmol/L 141 139 139   POTASSIUM mmol/L 3.4* 4.9 3.9   CHLORIDE mmol/L 106 100 103   CO2 mmol/L 23.8 26.8 24.7   BUN mg/dL 21 20 10   CREATININE mg/dL 0.66 0.98 0.83   GLUCOSE mg/dL 127* 115* 169*   CALCIUM mg/dL 11.6* 13.3* 13.0*     Results from last 7 days   Lab Units 03/08/19  0522 03/05/19  0607 03/04/19  1151   WBC 10*3/mm3 13.54* 9.40 9.63   HEMOGLOBIN g/dL 14.0 15.2 15.3   HEMATOCRIT % 43.3 44.9 45.9   PLATELETS 10*3/mm3 226 228 208     Results from last 7 days   Lab Units 03/04/19  1151   TSH mIU/mL 1.310   FREE T4 ng/dL 1.36     Results from last 7 days   Lab Units 03/05/19  0607   HEMOGLOBIN A1C % 5.00     No results found for: TROPONINT      Results from last 7 days   Lab Units 03/05/19  0607   ALK PHOS U/L 114   BILIRUBIN mg/dL 0.6   ALT (SGPT) U/L 12   AST (SGOT) U/L 11     Results from last 7 days   Lab Units 03/04/19  1151   TSH mIU/mL 1.310   FREE T4 ng/dL 1.36     Results from last 7 days   Lab Units 03/05/19  0607   HEMOGLOBIN A1C % 5.00     No results found for: POCGLU  Results from last 7 days   Lab Units 03/05/19  0607   INR  1.04       Past Medical History:   Diagnosis Date   • Morbid obesity (CMS/HCC)    • Ovarian cyst    • Tachycardia        Assessment:  Active Hospital Problems    Diagnosis  POA   • **Diplopia [H53.2]  Unknown   • Hypercalcemia [E83.52]  Unknown   • Hyperparathyroidism (CMS/HCC) [E21.3]  Unknown   • Cerebral edema (CMS/HCC) [G93.6]  Yes   • Meningioma (CMS/HCC) [D32.9]  Yes   • AVF (arteriovenous fistula) (CMS/HCC) [I77.0]  Yes   • 6th nerve palsy, 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       Plan:  Continue with IV fluid hydration . Follow up labs. As per nephrology and neurosurgery. Calcitonin injections.    Pranav Galindo MD  3/8/2019  12:57 PM

## 2019-03-09 LAB
ANION GAP SERPL CALCULATED.3IONS-SCNC: 8.1 MMOL/L
BASOPHILS # BLD AUTO: 0.02 10*3/MM3 (ref 0–0.2)
BASOPHILS NFR BLD AUTO: 0.1 % (ref 0–1.5)
BUN BLD-MCNC: 17 MG/DL (ref 8–23)
BUN/CREAT SERPL: 27.9 (ref 7–25)
CALCIUM SPEC-SCNC: 12 MG/DL (ref 8.6–10.5)
CHLORIDE SERPL-SCNC: 109 MMOL/L (ref 98–107)
CO2 SERPL-SCNC: 22.9 MMOL/L (ref 22–29)
CREAT BLD-MCNC: 0.61 MG/DL (ref 0.57–1)
DEPRECATED RDW RBC AUTO: 42.5 FL (ref 37–54)
EOSINOPHIL # BLD AUTO: 0 10*3/MM3 (ref 0–0.4)
EOSINOPHIL NFR BLD AUTO: 0 % (ref 0.3–6.2)
ERYTHROCYTE [DISTWIDTH] IN BLOOD BY AUTOMATED COUNT: 13.3 % (ref 12.3–15.4)
GFR SERPL CREATININE-BSD FRML MDRD: 99 ML/MIN/1.73
GLUCOSE BLD-MCNC: 110 MG/DL (ref 65–99)
HCT VFR BLD AUTO: 45 % (ref 34–46.6)
HGB BLD-MCNC: 15.2 G/DL (ref 12–15.9)
IMM GRANULOCYTES # BLD AUTO: 0.16 10*3/MM3 (ref 0–0.05)
IMM GRANULOCYTES NFR BLD AUTO: 1 % (ref 0–0.5)
LYMPHOCYTES # BLD AUTO: 1.12 10*3/MM3 (ref 0.7–3.1)
LYMPHOCYTES NFR BLD AUTO: 7 % (ref 19.6–45.3)
MCH RBC QN AUTO: 29.5 PG (ref 26.6–33)
MCHC RBC AUTO-ENTMCNC: 33.8 G/DL (ref 31.5–35.7)
MCV RBC AUTO: 87.2 FL (ref 79–97)
MONOCYTES # BLD AUTO: 1.18 10*3/MM3 (ref 0.1–0.9)
MONOCYTES NFR BLD AUTO: 7.3 % (ref 5–12)
NEUTROPHILS # BLD AUTO: 13.63 10*3/MM3 (ref 1.4–7)
NEUTROPHILS NFR BLD AUTO: 84.6 % (ref 42.7–76)
NRBC BLD AUTO-RTO: 0 /100 WBC (ref 0–0)
PLATELET # BLD AUTO: 250 10*3/MM3 (ref 140–450)
PMV BLD AUTO: 10.1 FL (ref 6–12)
POTASSIUM BLD-SCNC: 4.2 MMOL/L (ref 3.5–5.2)
RBC # BLD AUTO: 5.16 10*6/MM3 (ref 3.77–5.28)
SODIUM BLD-SCNC: 140 MMOL/L (ref 136–145)
WBC NRBC COR # BLD: 16.11 10*3/MM3 (ref 3.4–10.8)

## 2019-03-09 PROCEDURE — 25010000002 ONDANSETRON PER 1 MG: Performed by: INTERNAL MEDICINE

## 2019-03-09 PROCEDURE — 80048 BASIC METABOLIC PNL TOTAL CA: CPT | Performed by: HOSPITALIST

## 2019-03-09 PROCEDURE — 25010000002 FUROSEMIDE PER 20 MG: Performed by: INTERNAL MEDICINE

## 2019-03-09 PROCEDURE — 85025 COMPLETE CBC W/AUTO DIFF WBC: CPT | Performed by: HOSPITALIST

## 2019-03-09 PROCEDURE — 99232 SBSQ HOSP IP/OBS MODERATE 35: CPT | Performed by: INTERNAL MEDICINE

## 2019-03-09 PROCEDURE — 25010000002 DEXAMETHASONE PER 1 MG: Performed by: NURSE PRACTITIONER

## 2019-03-09 RX ORDER — CALCITONIN SALMON 200 [USP'U]/ML
4 INJECTION, SOLUTION INTRAMUSCULAR; SUBCUTANEOUS EVERY 12 HOURS
Status: COMPLETED | OUTPATIENT
Start: 2019-03-09 | End: 2019-03-10

## 2019-03-09 RX ADMIN — SODIUM CHLORIDE, PRESERVATIVE FREE 3 ML: 5 INJECTION INTRAVENOUS at 20:55

## 2019-03-09 RX ADMIN — AMLODIPINE BESYLATE 10 MG: 10 TABLET ORAL at 08:59

## 2019-03-09 RX ADMIN — SODIUM CHLORIDE 175 ML/HR: 9 INJECTION, SOLUTION INTRAVENOUS at 23:50

## 2019-03-09 RX ADMIN — DEXAMETHASONE SODIUM PHOSPHATE 4 MG: 4 INJECTION, SOLUTION INTRAMUSCULAR; INTRAVENOUS at 06:05

## 2019-03-09 RX ADMIN — SODIUM CHLORIDE 175 ML/HR: 9 INJECTION, SOLUTION INTRAVENOUS at 17:31

## 2019-03-09 RX ADMIN — DEXAMETHASONE SODIUM PHOSPHATE 4 MG: 4 INJECTION, SOLUTION INTRAMUSCULAR; INTRAVENOUS at 23:50

## 2019-03-09 RX ADMIN — FUROSEMIDE 40 MG: 10 INJECTION, SOLUTION INTRAMUSCULAR; INTRAVENOUS at 08:58

## 2019-03-09 RX ADMIN — DEXAMETHASONE SODIUM PHOSPHATE 4 MG: 4 INJECTION, SOLUTION INTRAMUSCULAR; INTRAVENOUS at 17:30

## 2019-03-09 RX ADMIN — ACETAMINOPHEN 650 MG: 325 TABLET, FILM COATED ORAL at 02:05

## 2019-03-09 RX ADMIN — POTASSIUM CHLORIDE 80 MEQ: 750 CAPSULE, EXTENDED RELEASE ORAL at 08:58

## 2019-03-09 RX ADMIN — FAMOTIDINE 20 MG: 20 TABLET, FILM COATED ORAL at 08:59

## 2019-03-09 RX ADMIN — DEXAMETHASONE SODIUM PHOSPHATE 4 MG: 4 INJECTION, SOLUTION INTRAMUSCULAR; INTRAVENOUS at 11:45

## 2019-03-09 RX ADMIN — SODIUM CHLORIDE 175 ML/HR: 9 INJECTION, SOLUTION INTRAVENOUS at 06:04

## 2019-03-09 RX ADMIN — SODIUM CHLORIDE, PRESERVATIVE FREE 3 ML: 5 INJECTION INTRAVENOUS at 08:59

## 2019-03-09 RX ADMIN — CALCITONIN SALMON 508 UNITS: 200 INJECTION, SOLUTION INTRAMUSCULAR; SUBCUTANEOUS at 20:53

## 2019-03-09 RX ADMIN — FAMOTIDINE 20 MG: 20 TABLET, FILM COATED ORAL at 20:54

## 2019-03-09 RX ADMIN — ACETAMINOPHEN 650 MG: 325 TABLET, FILM COATED ORAL at 11:49

## 2019-03-09 RX ADMIN — SODIUM CHLORIDE 175 ML/HR: 9 INJECTION, SOLUTION INTRAVENOUS at 11:51

## 2019-03-09 RX ADMIN — ONDANSETRON HYDROCHLORIDE 4 MG: 2 SOLUTION INTRAMUSCULAR; INTRAVENOUS at 08:59

## 2019-03-09 NOTE — PROGRESS NOTES
"DAILY PROGRESS NOTE  [facility]    Patient Identification:  Name: Joan Almanza  Age: 64 y.o.  Sex: female  :  1955  MRN: [id number]         Primary Care Physician: Juarez Winters MD    Subjective:  Interval History:She is still having diplopia and elevated calcium.  Headache and nausea.    Objective:    Scheduled Meds:    amLODIPine 10 mg Oral Q24H   calcitonin 4 Units/kg Subcutaneous Q12H   dexamethasone 4 mg Intravenous Q6H   docusate sodium 100 mg Oral BID   famotidine 20 mg Oral BID   furosemide 40 mg Intravenous Daily   potassium chloride 80 mEq Oral Daily   sodium chloride 3 mL Intravenous Q12H     Continuous Infusions:    sodium chloride 175 mL/hr Last Rate: 175 mL/hr (19 1151)       Vital signs in last 24 hours:  Temp:  [97.4 °F (36.3 °C)-98.4 °F (36.9 °C)] 97.4 °F (36.3 °C)  Heart Rate:  [50-65] 50  Resp:  [16-18] 18  BP: (146-187)/(71-86) 147/72    Intake/Output:    Intake/Output Summary (Last 24 hours) at 3/9/2019 1411  Last data filed at 3/9/2019 1400  Gross per 24 hour   Intake 1480 ml   Output 4400 ml   Net -2920 ml       Exam:  /72 (BP Location: Right arm, Patient Position: Lying)   Pulse 50   Temp 97.4 °F (36.3 °C) (Oral)   Resp 18   Ht 157.5 cm (62\")   Wt 130 kg (287 lb 0.6 oz)   SpO2 92%   BMI 52.50 kg/m²     General Appearance:    Alert, cooperative, no distress   Head:    Normocephalic, without obvious abnormality, atraumatic   Eyes:       Throat:   Lips, tongue, gums normal   Neck:   Supple, symmetrical, trachea midline, no JVD   Lungs:     Clear to auscultation bilaterally, respirations unlabored   Chest Wall:    No tenderness or deformity    Heart:    Regular rate and rhythm, S1 and S2 normal, no murmur,no  Rub or gallop   Abdomen:     Soft, non-tender, bowel sounds active, no masses, no organomegaly    Extremities:   Extremities normal, atraumatic, no cyanosis or edema   Pulses:      Skin:   Skin is warm and dry,  no rashes or palpable lesions "   Neurologic:   no focal deficits noted      Lab Results (last 72 hours)     Procedure Component Value Units Date/Time    Immunofixation, Serum [[id number]]  (Abnormal) Collected:  03/05/19 1634    Specimen:  Blood Updated:  03/07/19 1515     Immunofixation Result, Serum Comment:     Comment: Presence of monoclonal protein is unclear at this time. Suggest  repeat in 3 to 6 months if clinically indicated.        IgG 1180 mg/dL      IgA 614 mg/dL      IgM 84 mg/dL     Narrative:       Performed at:  [address] [place] [address]  [id number]  : Huseyin Calderon PhD, Phone:  [phone]    Basic Metabolic Panel [[id number]]  (Abnormal) Collected:  03/06/19 1040    Specimen:  Blood Updated:  03/06/19 1138     Glucose 115 mg/dL      BUN 20 mg/dL      Creatinine 0.98 mg/dL      Sodium 139 mmol/L      Potassium 4.9 mmol/L      Chloride 100 mmol/L      CO2 26.8 mmol/L      Calcium 13.3 mg/dL      eGFR Non African Amer 57 mL/min/1.73      BUN/Creatinine Ratio 20.4     Anion Gap 12.2 mmol/L     Narrative:       GFR Normal >60  Chronic Kidney Disease <60  Kidney Failure <15    Vitamin D 25 Hydroxy [[id number]]  (Normal) Collected:  03/05/19 1634    Specimen:  Blood Updated:  03/05/19 1742     25 Hydroxy, Vitamin D 36.2 ng/ml     Narrative:       Reference Range for Total Vitamin D 25(OH)     Deficiency <20.0 ng/mL   Insufficiency 21-29 ng/mL   Sufficiency  ng/mL  Toxicity >100 ng/ml    PTH, Intact [[id number]]  (Abnormal) Collected:  03/05/19 1634    Specimen:  Blood Updated:  03/05/19 1737     PTH, Intact 172.0 pg/mL     Immunofixation, Urine - [[id number]] Collected:  03/05/19 1722    Specimen:  Urine Updated:  03/05/19 1727    Immunoglobulin Free LT Chains Blood [[id number]] Collected:  03/05/19 1634    Specimen:  Blood Updated:  03/05/19 1657    PTH-related Peptide [[id number]] Collected:  03/05/19 1634    Specimen:  Blood Updated:  03/05/19 1657    Hemoglobin A1c [[id number]]  (Normal)  Collected:  03/05/19 0607    Specimen:  Blood Updated:  03/05/19 0847     Hemoglobin A1C 5.00 %     Narrative:       Hemoglobin A1C Ranges:    Increased Risk for Diabetes  5.7% to 6.4%  Diabetes                     >= 6.5%  Diabetic Goal                < 7.0%    Comprehensive Metabolic Panel [[id number]]  (Abnormal) Collected:  03/05/19 0607    Specimen:  Blood Updated:  03/05/19 0658     Glucose 169 mg/dL      BUN 10 mg/dL      Creatinine 0.83 mg/dL      Sodium 139 mmol/L      Potassium 3.9 mmol/L      Chloride 103 mmol/L      CO2 24.7 mmol/L      Calcium 13.0 mg/dL      Total Protein 7.9 g/dL      Albumin 4.10 g/dL      ALT (SGPT) 12 U/L      AST (SGOT) 11 U/L      Alkaline Phosphatase 114 U/L      Total Bilirubin 0.6 mg/dL      eGFR Non African Amer 69 mL/min/1.73      Globulin 3.8 gm/dL      A/G Ratio 1.1 g/dL      BUN/Creatinine Ratio 12.0     Anion Gap 11.3 mmol/L     Narrative:       GFR Normal >60  Chronic Kidney Disease <60  Kidney Failure <15    Magnesium [[id number]]  (Normal) Collected:  03/05/19 0607    Specimen:  Blood Updated:  03/05/19 0652     Magnesium 1.8 mg/dL     Protime-INR [[id number]]  (Normal) Collected:  03/05/19 0607    Specimen:  Blood Updated:  03/05/19 0645     Protime 13.4 Seconds      INR 1.04    CBC Auto Differential [[id number]]  (Abnormal) Collected:  03/05/19 0607    Specimen:  Blood Updated:  03/05/19 0628     WBC 9.40 10*3/mm3      RBC 5.19 10*6/mm3      Hemoglobin 15.2 g/dL      Hematocrit 44.9 %      MCV 86.5 fL      MCH 29.3 pg      MCHC 33.9 g/dL      RDW 13.6 %      RDW-SD 42.4 fl      MPV 9.9 fL      Platelets 228 10*3/mm3      Neutrophil % 89.3 %      Lymphocyte % 7.3 %      Monocyte % 1.9 %      Eosinophil % 0.0 %      Basophil % 0.1 %      Immature Grans % 1.4 %      Neutrophils, Absolute 8.39 10*3/mm3      Lymphocytes, Absolute 0.69 10*3/mm3      Monocytes, Absolute 0.18 10*3/mm3      Eosinophils, Absolute 0.00 10*3/mm3      Basophils, Absolute 0.01 10*3/mm3      Immature  Grans, Absolute 0.13 10*3/mm3      nRBC 0.0 /100 WBC         Data Review:  Results from last 7 days   Lab Units 03/09/19  0522 03/08/19  0522 03/06/19  1040   SODIUM mmol/L 140 141 139   POTASSIUM mmol/L 4.2 3.4* 4.9   CHLORIDE mmol/L 109* 106 100   CO2 mmol/L 22.9 23.8 26.8   BUN mg/dL 17 21 20   CREATININE mg/dL 0.61 0.66 0.98   GLUCOSE mg/dL 110* 127* 115*   CALCIUM mg/dL 12.0* 11.6* 13.3*     Results from last 7 days   Lab Units 03/09/19  0522 03/08/19  0522 03/05/19  0607   WBC 10*3/mm3 16.11* 13.54* 9.40   HEMOGLOBIN g/dL 15.2 14.0 15.2   HEMATOCRIT % 45.0 43.3 44.9   PLATELETS 10*3/mm3 250 226 228     Results from last 7 days   Lab Units 03/04/19  1151   TSH mIU/mL 1.310   FREE T4 ng/dL 1.36     Results from last 7 days   Lab Units 03/05/19  0607   HEMOGLOBIN A1C % 5.00     No results found for: TROPONINT      Results from last 7 days   Lab Units 03/05/19  0607   ALK PHOS U/L 114   BILIRUBIN mg/dL 0.6   ALT (SGPT) U/L 12   AST (SGOT) U/L 11     Results from last 7 days   Lab Units 03/04/19  1151   TSH mIU/mL 1.310   FREE T4 ng/dL 1.36     Results from last 7 days   Lab Units 03/05/19  0607   HEMOGLOBIN A1C % 5.00     No results found for: POCGLU  Results from last 7 days   Lab Units 03/05/19  0607   INR  1.04       Past Medical History:   Diagnosis Date   • Morbid obesity (CMS/HCC)    • Ovarian cyst    • Tachycardia        Assessment:  Active Hospital Problems    Diagnosis  POA   • **Diplopia [H53.2]  Unknown   • Hypercalcemia [E83.52]  Unknown   • Hyperparathyroidism (CMS/HCC) [E21.3]  Unknown   • Cerebral edema (CMS/HCC) [G93.6]  Yes   • Meningioma (CMS/HCC) [D32.9]  Yes   • AVF (arteriovenous fistula) (CMS/HCC) [I77.0]  Yes   • 6th nerve palsy, 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       Plan:  Continue with IV fluid hydration . Follow up labs. As per nephrology and neurosurgery. Calcitonin injections if they can get it.    Pranav Galindo,  MD  3/9/2019  2:11 PM

## 2019-03-09 NOTE — PLAN OF CARE
Problem: Patient Care Overview  Goal: Plan of Care Review  Outcome: Ongoing (interventions implemented as appropriate)   03/09/19 0632   Coping/Psychosocial   Plan of Care Reviewed With patient   OTHER   Outcome Summary Pt was medicated as ordered & tolerated well, double vission still present, vss, I will continue to monitor   Plan of Care Review   Progress improving     Goal: Individualization and Mutuality  Outcome: Ongoing (interventions implemented as appropriate)    Goal: Discharge Needs Assessment  Outcome: Ongoing (interventions implemented as appropriate)    Goal: Interprofessional Rounds/Family Conf  Outcome: Ongoing (interventions implemented as appropriate)      Problem: Skin Injury Risk (Adult)  Goal: Identify Related Risk Factors and Signs and Symptoms  Outcome: Ongoing (interventions implemented as appropriate)    Goal: Skin Health and Integrity  Outcome: Ongoing (interventions implemented as appropriate)      Problem: Pain, Acute (Adult)  Goal: Identify Related Risk Factors and Signs and Symptoms  Outcome: Ongoing (interventions implemented as appropriate)    Goal: Acceptable Pain Control/Comfort Level  Outcome: Ongoing (interventions implemented as appropriate)

## 2019-03-09 NOTE — PROGRESS NOTES
LOS: 5 days   Patient Care Team:  Juarez Winters MD as PCP - General (Family Medicine)    Chief Complaint: following for low HR and high BP     Interval History:   Continues with HA, visual changes.  No cardiac complaints.      Objective   Vital Signs  Temp:  [97.4 °F (36.3 °C)-98.4 °F (36.9 °C)] 97.4 °F (36.3 °C)  Heart Rate:  [50-74] 50  Resp:  [16-18] 18  BP: (146-187)/(71-86) 187/86    Intake/Output Summary (Last 24 hours) at 3/9/2019 1007  Last data filed at 3/9/2019 0900  Gross per 24 hour   Intake 720 ml   Output 1900 ml   Net -1180 ml           Physical Exam   Constitutional:   obese   HENT:   Head: Normocephalic.   Nose: Nose normal.   Mouth/Throat: Oropharynx is clear and moist. Abnormal dentition.   Eyes: Conjunctivae and EOM are normal. Pupils are equal, round, and reactive to light.   Neck: Normal range of motion.   Cannot assess JVD due to body habitus   Cardiovascular: Regular rhythm and normal heart sounds. Bradycardia present.   Pulmonary/Chest: Effort normal and breath sounds normal.   Abdominal: Soft.   Cannot feel organs or aorta   Musculoskeletal: Normal range of motion. She exhibits no edema.   Neurological: She is alert.   Skin: Skin is warm and dry. No erythema.   Psychiatric: She has a normal mood and affect. Her behavior is normal.   Vitals reviewed.      Results Review:      Results from last 7 days   Lab Units 03/09/19 0522 03/08/19  0522 03/06/19  1040   SODIUM mmol/L 140 141 139   POTASSIUM mmol/L 4.2 3.4* 4.9   CHLORIDE mmol/L 109* 106 100   CO2 mmol/L 22.9 23.8 26.8   BUN mg/dL 17 21 20   CREATININE mg/dL 0.61 0.66 0.98   GLUCOSE mg/dL 110* 127* 115*   CALCIUM mg/dL 12.0* 11.6* 13.3*         Results from last 7 days   Lab Units 03/09/19  0522 03/08/19  0522 03/05/19  0607   WBC 10*3/mm3 16.11* 13.54* 9.40   HEMOGLOBIN g/dL 15.2 14.0 15.2   HEMATOCRIT % 45.0 43.3 44.9   PLATELETS 10*3/mm3 250 226 228     Results from last 7 days   Lab Units 03/05/19  0607   INR  1.04          Results from last 7 days   Lab Units 03/05/19  0607   MAGNESIUM mg/dL 1.8           I reviewed the patient's new clinical results.I personally viewed and interpreted the patient's EKG/Telemetry data -- SR/SB on tele        Medication Review:     amLODIPine 10 mg Oral Q24H   calcitonin 4 Units/kg Subcutaneous Q12H   dexamethasone 4 mg Intravenous Q6H   docusate sodium 100 mg Oral BID   famotidine 20 mg Oral BID   furosemide 40 mg Intravenous Daily   potassium chloride 80 mEq Oral Daily   sodium chloride 3 mL Intravenous Q12H         sodium chloride 175 mL/hr Last Rate: 175 mL/hr (03/09/19 0604)       Assessment/Plan     Mild sinus bradycardia and elevated BP are worsened by intracranial hypertension/cerebral edema and steroids.  Continue amlodipine.  She's on IV furosemide for hypercalcemia.  An ACE/ARB could be considered.  Ultimately though, her BP is going to be tough to treat since it's secondary.    Will see as needed; defer further BP management to primary service.    Porfirio Roman MD  03/09/19  10:07 AM

## 2019-03-10 LAB
ANION GAP SERPL CALCULATED.3IONS-SCNC: 10.5 MMOL/L
BASOPHILS # BLD AUTO: 0.02 10*3/MM3 (ref 0–0.2)
BASOPHILS NFR BLD AUTO: 0.1 % (ref 0–1.5)
BUN BLD-MCNC: 19 MG/DL (ref 8–23)
BUN/CREAT SERPL: 26.4 (ref 7–25)
CALCIUM SPEC-SCNC: 11.5 MG/DL (ref 8.6–10.5)
CHLORIDE SERPL-SCNC: 107 MMOL/L (ref 98–107)
CO2 SERPL-SCNC: 22.5 MMOL/L (ref 22–29)
CREAT BLD-MCNC: 0.72 MG/DL (ref 0.57–1)
DEPRECATED RDW RBC AUTO: 43.5 FL (ref 37–54)
EOSINOPHIL # BLD AUTO: 0 10*3/MM3 (ref 0–0.4)
EOSINOPHIL NFR BLD AUTO: 0 % (ref 0.3–6.2)
ERYTHROCYTE [DISTWIDTH] IN BLOOD BY AUTOMATED COUNT: 13.6 % (ref 12.3–15.4)
GFR SERPL CREATININE-BSD FRML MDRD: 82 ML/MIN/1.73
GLUCOSE BLD-MCNC: 126 MG/DL (ref 65–99)
HCT VFR BLD AUTO: 43 % (ref 34–46.6)
HGB BLD-MCNC: 14.3 G/DL (ref 12–15.9)
IMM GRANULOCYTES # BLD AUTO: 0.2 10*3/MM3 (ref 0–0.05)
IMM GRANULOCYTES NFR BLD AUTO: 1.3 % (ref 0–0.5)
LYMPHOCYTES # BLD AUTO: 0.9 10*3/MM3 (ref 0.7–3.1)
LYMPHOCYTES NFR BLD AUTO: 5.8 % (ref 19.6–45.3)
MCH RBC QN AUTO: 29.2 PG (ref 26.6–33)
MCHC RBC AUTO-ENTMCNC: 33.3 G/DL (ref 31.5–35.7)
MCV RBC AUTO: 87.9 FL (ref 79–97)
MONOCYTES # BLD AUTO: 1.13 10*3/MM3 (ref 0.1–0.9)
MONOCYTES NFR BLD AUTO: 7.3 % (ref 5–12)
NEUTROPHILS # BLD AUTO: 13.14 10*3/MM3 (ref 1.4–7)
NEUTROPHILS NFR BLD AUTO: 85.5 % (ref 42.7–76)
NRBC BLD AUTO-RTO: 0 /100 WBC (ref 0–0)
PLATELET # BLD AUTO: 214 10*3/MM3 (ref 140–450)
PMV BLD AUTO: 10 FL (ref 6–12)
POTASSIUM BLD-SCNC: 4.5 MMOL/L (ref 3.5–5.2)
PTH RELATED PROT SERPL-SCNC: <2 PMOL/L
RBC # BLD AUTO: 4.89 10*6/MM3 (ref 3.77–5.28)
SODIUM BLD-SCNC: 140 MMOL/L (ref 136–145)
WBC NRBC COR # BLD: 15.39 10*3/MM3 (ref 3.4–10.8)

## 2019-03-10 PROCEDURE — 25010000002 DEXAMETHASONE PER 1 MG: Performed by: NURSE PRACTITIONER

## 2019-03-10 PROCEDURE — 80048 BASIC METABOLIC PNL TOTAL CA: CPT | Performed by: HOSPITALIST

## 2019-03-10 PROCEDURE — 85025 COMPLETE CBC W/AUTO DIFF WBC: CPT | Performed by: HOSPITALIST

## 2019-03-10 PROCEDURE — 25010000002 FUROSEMIDE PER 20 MG: Performed by: INTERNAL MEDICINE

## 2019-03-10 PROCEDURE — 94799 UNLISTED PULMONARY SVC/PX: CPT

## 2019-03-10 RX ORDER — CINACALCET 30 MG/1
30 TABLET, FILM COATED ORAL
Status: DISCONTINUED | OUTPATIENT
Start: 2019-03-10 | End: 2019-03-13

## 2019-03-10 RX ORDER — HYDRALAZINE HYDROCHLORIDE 25 MG/1
25 TABLET, FILM COATED ORAL EVERY 8 HOURS SCHEDULED
Status: DISCONTINUED | OUTPATIENT
Start: 2019-03-10 | End: 2019-03-17

## 2019-03-10 RX ORDER — FUROSEMIDE 10 MG/ML
20 INJECTION INTRAMUSCULAR; INTRAVENOUS DAILY
Status: DISCONTINUED | OUTPATIENT
Start: 2019-03-11 | End: 2019-03-14

## 2019-03-10 RX ADMIN — SODIUM CHLORIDE 175 ML/HR: 9 INJECTION, SOLUTION INTRAVENOUS at 06:43

## 2019-03-10 RX ADMIN — FUROSEMIDE 40 MG: 10 INJECTION, SOLUTION INTRAMUSCULAR; INTRAVENOUS at 09:28

## 2019-03-10 RX ADMIN — DEXAMETHASONE SODIUM PHOSPHATE 4 MG: 4 INJECTION, SOLUTION INTRAMUSCULAR; INTRAVENOUS at 23:24

## 2019-03-10 RX ADMIN — DEXAMETHASONE SODIUM PHOSPHATE 4 MG: 4 INJECTION, SOLUTION INTRAMUSCULAR; INTRAVENOUS at 11:39

## 2019-03-10 RX ADMIN — DEXAMETHASONE SODIUM PHOSPHATE 4 MG: 4 INJECTION, SOLUTION INTRAMUSCULAR; INTRAVENOUS at 17:58

## 2019-03-10 RX ADMIN — CINACALCET HYDROCHLORIDE 30 MG: 30 TABLET, COATED ORAL at 18:45

## 2019-03-10 RX ADMIN — ACETAMINOPHEN 650 MG: 325 TABLET, FILM COATED ORAL at 11:41

## 2019-03-10 RX ADMIN — HYDRALAZINE HYDROCHLORIDE 25 MG: 25 TABLET ORAL at 22:49

## 2019-03-10 RX ADMIN — FAMOTIDINE 20 MG: 20 TABLET, FILM COATED ORAL at 09:28

## 2019-03-10 RX ADMIN — FAMOTIDINE 20 MG: 20 TABLET, FILM COATED ORAL at 22:49

## 2019-03-10 RX ADMIN — CALCITONIN SALMON 508 UNITS: 200 INJECTION, SOLUTION INTRAMUSCULAR; SUBCUTANEOUS at 09:29

## 2019-03-10 RX ADMIN — DEXAMETHASONE SODIUM PHOSPHATE 4 MG: 4 INJECTION, SOLUTION INTRAMUSCULAR; INTRAVENOUS at 06:43

## 2019-03-10 RX ADMIN — POTASSIUM CHLORIDE 80 MEQ: 750 CAPSULE, EXTENDED RELEASE ORAL at 09:28

## 2019-03-10 RX ADMIN — ACETAMINOPHEN 650 MG: 325 TABLET, FILM COATED ORAL at 17:58

## 2019-03-10 RX ADMIN — SODIUM CHLORIDE 175 ML/HR: 9 INJECTION, SOLUTION INTRAVENOUS at 11:41

## 2019-03-10 RX ADMIN — ACETAMINOPHEN 650 MG: 325 TABLET, FILM COATED ORAL at 03:45

## 2019-03-10 RX ADMIN — AMLODIPINE BESYLATE 10 MG: 10 TABLET ORAL at 09:28

## 2019-03-10 RX ADMIN — SODIUM CHLORIDE 100 ML/HR: 9 INJECTION, SOLUTION INTRAVENOUS at 20:28

## 2019-03-10 RX ADMIN — SODIUM CHLORIDE, PRESERVATIVE FREE 3 ML: 5 INJECTION INTRAVENOUS at 09:30

## 2019-03-10 NOTE — PROGRESS NOTES
"   LOS: 6 days    Patient Care Team:  Juarez Winters MD as PCP - General (Family Medicine)    Chief Complaint:    Chief Complaint   Patient presents with   • Headache   • Eye Pain   • Diplopia     Follow UP Hypercalcemia  Subjective     Interval History:     S:  Nervous about ongoing medical issues  O:  No soa or cp.  No n/v/d.  Objective     Vital Signs  Temp:  [97.3 °F (36.3 °C)-98.5 °F (36.9 °C)] 97.3 °F (36.3 °C)  Heart Rate:  [45-50] 45  Resp:  [18] 18  BP: (148-176)/(72-90) 174/82    Flowsheet Rows      First Filed Value   Admission Height  157.5 cm (62\") Documented at 03/04/2019 1050   Admission Weight  122 kg (270 lb) Documented at 03/04/2019 1050          I/O this shift:  In: 1000 [I.V.:1000]  Out: 2900 [Urine:2900]  I/O last 3 completed shifts:  In: 2240 [P.O.:240; I.V.:2000]  Out: 5900 [Urine:5900]    Intake/Output Summary (Last 24 hours) at 3/10/2019 1817  Last data filed at 3/10/2019 1303  Gross per 24 hour   Intake 1000 ml   Output 5100 ml   Net -4100 ml       Physical Exam:  Exam:  GEN:               No distress, appears stated age  EYES:              Anicteric sclera  ENT:                External ears/nose normal, MM are moist  NECK:             No adenopathy, JVP none  LUNGS:           Normal chest on inspection; not labored  CV:                  Normal S1S2, without murmur  ABD:                Non-tender, non-distended, no hepatosplenomegaly, +BS  EXT:                No edema; no cyanosis; clubbing       Results Review:    Results from last 7 days   Lab Units 03/10/19  0539 03/09/19  0522 03/08/19  0522  03/05/19  0607   SODIUM mmol/L 140 140 141   < > 139   POTASSIUM mmol/L 4.5 4.2 3.4*   < > 3.9   CHLORIDE mmol/L 107 109* 106   < > 103   CO2 mmol/L 22.5 22.9 23.8   < > 24.7   BUN mg/dL 19 17 21   < > 10   CREATININE mg/dL 0.72 0.61 0.66   < > 0.83   CALCIUM mg/dL 11.5* 12.0* 11.6*   < > 13.0*   BILIRUBIN mg/dL  --   --   --   --  0.6   ALK PHOS U/L  --   --   --   --  114   ALT (SGPT) U/L  -- "   --   --   --  12   AST (SGOT) U/L  --   --   --   --  11   GLUCOSE mg/dL 126* 110* 127*   < > 169*    < > = values in this interval not displayed.       Estimated Creatinine Clearance: 102.3 mL/min (by C-G formula based on SCr of 0.72 mg/dL).    Results from last 7 days   Lab Units 03/05/19  0607   MAGNESIUM mg/dL 1.8             Results from last 7 days   Lab Units 03/10/19  0539 03/09/19  0522 03/08/19  0522 03/05/19  0607 03/04/19  1151   WBC 10*3/mm3 15.39* 16.11* 13.54* 9.40 9.63   HEMOGLOBIN g/dL 14.3 15.2 14.0 15.2 15.3   PLATELETS 10*3/mm3 214 250 226 228 208       Results from last 7 days   Lab Units 03/05/19  0607   INR  1.04         Imaging Results (last 24 hours)     ** No results found for the last 24 hours. **          amLODIPine 10 mg Oral Q24H   dexamethasone 4 mg Intravenous Q6H   docusate sodium 100 mg Oral BID   famotidine 20 mg Oral BID   [START ON 3/11/2019] furosemide 20 mg Intravenous Daily   hydrALAZINE 25 mg Oral Q8H   potassium chloride 80 mEq Oral Daily   sodium chloride 3 mL Intravenous Q12H       sodium chloride 100 mL/hr Last Rate: 175 mL/hr (03/10/19 1141)       Medication Review:   Current Facility-Administered Medications   Medication Dose Route Frequency Provider Last Rate Last Dose   • acetaminophen (TYLENOL) tablet 650 mg  650 mg Oral Q4H PRN Alice Simmons MD   650 mg at 03/10/19 1758   • amLODIPine (NORVASC) tablet 10 mg  10 mg Oral Q24H Carol Ann Johnson APRN   10 mg at 03/10/19 0928   • dexamethasone (DECADRON) injection 4 mg  4 mg Intravenous Q6H Elisabet Skinner APRN   4 mg at 03/10/19 1758   • docusate sodium (COLACE) capsule 100 mg  100 mg Oral BID Pranav Galindo MD   100 mg at 03/08/19 0923   • famotidine (PEPCID) tablet 20 mg  20 mg Oral BID Elisabet Skinner APRN   20 mg at 03/10/19 0928   • [START ON 3/11/2019] furosemide (LASIX) injection 20 mg  20 mg Intravenous Daily So Foley MD       • hydrALAZINE (APRESOLINE) injection 10 mg  10 mg Intravenous  Q6H PRN Carol Ann Johnson APRN       • hydrALAZINE (APRESOLINE) tablet 25 mg  25 mg Oral Q8H So Foley MD       • HYDROcodone-acetaminophen (NORCO) 7.5-325 MG per tablet 1 tablet  1 tablet Oral Q4H PRN Surjit Ellsworth MD   1 tablet at 03/05/19 3584   • HYDROmorphone (DILAUDID) injection 0.5 mg  0.5 mg Intravenous Q2H PRN Alice Simmons MD        And   • naloxone (NARCAN) injection 0.4 mg  0.4 mg Intravenous Q5 Min PRN Alice Simmons MD       • nitroglycerin (NITROSTAT) SL tablet 0.4 mg  0.4 mg Sublingual Q5 Min PRN Alice Simmons MD       • ondansetron (ZOFRAN) injection 4 mg  4 mg Intravenous Q6H PRN Alice Simmons MD   4 mg at 03/09/19 0859   • potassium chloride (MICRO-K) CR capsule 80 mEq  80 mEq Oral Daily Ty Lofton MD   80 mEq at 03/10/19 0928   • sodium chloride 0.9 % flush 10 mL  10 mL Intravenous PRN Bull Branch II, MD       • sodium chloride 0.9 % flush 3 mL  3 mL Intravenous Q12H Alice Simmons MD   3 mL at 03/10/19 0930   • sodium chloride 0.9 % flush 3-10 mL  3-10 mL Intravenous PRN Alice Simmons MD       • sodium chloride 0.9 % infusion  100 mL/hr Intravenous Continuous So Foley  mL/hr at 03/10/19 1141 175 mL/hr at 03/10/19 1141       Assessment/Plan         Diplopia    Cerebral edema (CMS/HCC)    Meningioma (CMS/HCC)    AVF (arteriovenous fistula) (CMS/HCC)    6th nerve palsy, left    Hypercalcemia    Hyperparathyroidism (CMS/HCC)    1. Hypercalcemia:  thiazide, hyperparathyroidism all contributing etiologies.  Pending parathyroid related peptide and free serum light chain.  Slowly improving calcium 11.5.       2. Hypertension- Hold Thiazide.  Uncontrolled.  Bradycardia noted  3. Intracranial meningioma with cerebral edema and 6th nerve palsy  4.  Severe obesity:  BMI 52     Plan:     Continue calcitonin   Work-up for hyperparathyroidism  Continue Lasix and IVF for now, cut back due to elevated bp  Start hydralazine for bp  Start sensipar 30mg  daily to try to get better control  Surveillance       So Foley MD  03/10/19  6:17 PM

## 2019-03-10 NOTE — PROGRESS NOTES
"DAILY PROGRESS NOTE  [facility]    Patient Identification:  Name: Joan Almanza  Age: 64 y.o.  Sex: female  :  1955  MRN: [id number]         Primary Care Physician: Juarez Winters MD    Subjective:  Interval History:She is still having diplopia and elevated calcium.  Headache and nausea.    Objective:    Scheduled Meds:    amLODIPine 10 mg Oral Q24H   dexamethasone 4 mg Intravenous Q6H   docusate sodium 100 mg Oral BID   famotidine 20 mg Oral BID   furosemide 40 mg Intravenous Daily   potassium chloride 80 mEq Oral Daily   sodium chloride 3 mL Intravenous Q12H     Continuous Infusions:    sodium chloride 175 mL/hr Last Rate: 175 mL/hr (03/10/19 1141)       Vital signs in last 24 hours:  Temp:  [97.6 °F (36.4 °C)-98.5 °F (36.9 °C)] 97.6 °F (36.4 °C)  Heart Rate:  [46-50] 46  Resp:  [18] 18  BP: (147-176)/(72-90) 175/77    Intake/Output:    Intake/Output Summary (Last 24 hours) at 3/10/2019 1352  Last data filed at 3/10/2019 1303  Gross per 24 hour   Intake 2000 ml   Output 6200 ml   Net -4200 ml       Exam:  /77 (BP Location: Left arm, Patient Position: Lying)   Pulse (!) 46   Temp 97.6 °F (36.4 °C) (Oral)   Resp 18   Ht 157.5 cm (62\")   Wt 130 kg (286 lb)   SpO2 91%   BMI 52.31 kg/m²     General Appearance:    Alert, cooperative, no distress   Head:    Normocephalic, without obvious abnormality, atraumatic   Eyes:       Throat:   Lips, tongue, gums normal   Neck:   Supple, symmetrical, trachea midline, no JVD   Lungs:     Clear to auscultation bilaterally, respirations unlabored   Chest Wall:    No tenderness or deformity    Heart:    Regular rate and rhythm, S1 and S2 normal, no murmur,no  Rub or gallop   Abdomen:     Soft, non-tender, bowel sounds active, no masses, no organomegaly    Extremities:   Extremities normal, atraumatic, no cyanosis or edema   Pulses:      Skin:   Skin is warm and dry,  no rashes or palpable lesions   Neurologic:   no focal deficits noted    "   Lab Results (last 72 hours)     Procedure Component Value Units Date/Time    Immunofixation, Serum [[id number]]  (Abnormal) Collected:  03/05/19 1634    Specimen:  Blood Updated:  03/07/19 1515     Immunofixation Result, Serum Comment:     Comment: Presence of monoclonal protein is unclear at this time. Suggest  repeat in 3 to 6 months if clinically indicated.        IgG 1180 mg/dL      IgA 614 mg/dL      IgM 84 mg/dL     Narrative:       Performed at:  [address] [place] [address]  [id number]  : Huseyin Calderon PhD, Phone:  [phone]    Basic Metabolic Panel [[id number]]  (Abnormal) Collected:  03/06/19 1040    Specimen:  Blood Updated:  03/06/19 1138     Glucose 115 mg/dL      BUN 20 mg/dL      Creatinine 0.98 mg/dL      Sodium 139 mmol/L      Potassium 4.9 mmol/L      Chloride 100 mmol/L      CO2 26.8 mmol/L      Calcium 13.3 mg/dL      eGFR Non African Amer 57 mL/min/1.73      BUN/Creatinine Ratio 20.4     Anion Gap 12.2 mmol/L     Narrative:       GFR Normal >60  Chronic Kidney Disease <60  Kidney Failure <15    Vitamin D 25 Hydroxy [[id number]]  (Normal) Collected:  03/05/19 1634    Specimen:  Blood Updated:  03/05/19 1742     25 Hydroxy, Vitamin D 36.2 ng/ml     Narrative:       Reference Range for Total Vitamin D 25(OH)     Deficiency <20.0 ng/mL   Insufficiency 21-29 ng/mL   Sufficiency  ng/mL  Toxicity >100 ng/ml    PTH, Intact [[id number]]  (Abnormal) Collected:  03/05/19 1634    Specimen:  Blood Updated:  03/05/19 1737     PTH, Intact 172.0 pg/mL     Immunofixation, Urine - [[id number]] Collected:  03/05/19 1722    Specimen:  Urine Updated:  03/05/19 1727    Immunoglobulin Free LT Chains Blood [[id number]] Collected:  03/05/19 1634    Specimen:  Blood Updated:  03/05/19 1657    PTH-related Peptide [[id number]] Collected:  03/05/19 1634    Specimen:  Blood Updated:  03/05/19 1657    Hemoglobin A1c [[id number]]  (Normal) Collected:  03/05/19 0607    Specimen:  Blood  Updated:  03/05/19 0847     Hemoglobin A1C 5.00 %     Narrative:       Hemoglobin A1C Ranges:    Increased Risk for Diabetes  5.7% to 6.4%  Diabetes                     >= 6.5%  Diabetic Goal                < 7.0%    Comprehensive Metabolic Panel [[id number]]  (Abnormal) Collected:  03/05/19 0607    Specimen:  Blood Updated:  03/05/19 0658     Glucose 169 mg/dL      BUN 10 mg/dL      Creatinine 0.83 mg/dL      Sodium 139 mmol/L      Potassium 3.9 mmol/L      Chloride 103 mmol/L      CO2 24.7 mmol/L      Calcium 13.0 mg/dL      Total Protein 7.9 g/dL      Albumin 4.10 g/dL      ALT (SGPT) 12 U/L      AST (SGOT) 11 U/L      Alkaline Phosphatase 114 U/L      Total Bilirubin 0.6 mg/dL      eGFR Non African Amer 69 mL/min/1.73      Globulin 3.8 gm/dL      A/G Ratio 1.1 g/dL      BUN/Creatinine Ratio 12.0     Anion Gap 11.3 mmol/L     Narrative:       GFR Normal >60  Chronic Kidney Disease <60  Kidney Failure <15    Magnesium [[id number]]  (Normal) Collected:  03/05/19 0607    Specimen:  Blood Updated:  03/05/19 0652     Magnesium 1.8 mg/dL     Protime-INR [[id number]]  (Normal) Collected:  03/05/19 0607    Specimen:  Blood Updated:  03/05/19 0645     Protime 13.4 Seconds      INR 1.04    CBC Auto Differential [[id number]]  (Abnormal) Collected:  03/05/19 0607    Specimen:  Blood Updated:  03/05/19 0628     WBC 9.40 10*3/mm3      RBC 5.19 10*6/mm3      Hemoglobin 15.2 g/dL      Hematocrit 44.9 %      MCV 86.5 fL      MCH 29.3 pg      MCHC 33.9 g/dL      RDW 13.6 %      RDW-SD 42.4 fl      MPV 9.9 fL      Platelets 228 10*3/mm3      Neutrophil % 89.3 %      Lymphocyte % 7.3 %      Monocyte % 1.9 %      Eosinophil % 0.0 %      Basophil % 0.1 %      Immature Grans % 1.4 %      Neutrophils, Absolute 8.39 10*3/mm3      Lymphocytes, Absolute 0.69 10*3/mm3      Monocytes, Absolute 0.18 10*3/mm3      Eosinophils, Absolute 0.00 10*3/mm3      Basophils, Absolute 0.01 10*3/mm3      Immature Grans, Absolute 0.13 10*3/mm3      nRBC 0.0  /100 WBC         Data Review:  Results from last 7 days   Lab Units 03/10/19  0539 03/09/19 0522 03/08/19 0522   SODIUM mmol/L 140 140 141   POTASSIUM mmol/L 4.5 4.2 3.4*   CHLORIDE mmol/L 107 109* 106   CO2 mmol/L 22.5 22.9 23.8   BUN mg/dL 19 17 21   CREATININE mg/dL 0.72 0.61 0.66   GLUCOSE mg/dL 126* 110* 127*   CALCIUM mg/dL 11.5* 12.0* 11.6*     Results from last 7 days   Lab Units 03/10/19  0539 03/09/19 0522 03/08/19  0522   WBC 10*3/mm3 15.39* 16.11* 13.54*   HEMOGLOBIN g/dL 14.3 15.2 14.0   HEMATOCRIT % 43.0 45.0 43.3   PLATELETS 10*3/mm3 214 250 226     Results from last 7 days   Lab Units 03/04/19  1151   TSH mIU/mL 1.310   FREE T4 ng/dL 1.36     Results from last 7 days   Lab Units 03/05/19  0607   HEMOGLOBIN A1C % 5.00     No results found for: TROPONINT      Results from last 7 days   Lab Units 03/05/19  0607   ALK PHOS U/L 114   BILIRUBIN mg/dL 0.6   ALT (SGPT) U/L 12   AST (SGOT) U/L 11     Results from last 7 days   Lab Units 03/04/19  1151   TSH mIU/mL 1.310   FREE T4 ng/dL 1.36     Results from last 7 days   Lab Units 03/05/19  0607   HEMOGLOBIN A1C % 5.00     No results found for: POCGLU  Results from last 7 days   Lab Units 03/05/19  0607   INR  1.04       Past Medical History:   Diagnosis Date   • Morbid obesity (CMS/HCC)    • Ovarian cyst    • Tachycardia        Assessment:  Active Hospital Problems    Diagnosis  POA   • **Diplopia [H53.2]  Unknown   • Hypercalcemia [E83.52]  Unknown   • Hyperparathyroidism (CMS/HCC) [E21.3]  Unknown   • Cerebral edema (CMS/HCC) [G93.6]  Yes   • Meningioma (CMS/HCC) [D32.9]  Yes   • AVF (arteriovenous fistula) (CMS/HCC) [I77.0]  Yes   • 6th nerve palsy, 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       Plan:  Continue with IV fluid hydration . Follow up labs. As per nephrology and neurosurgery. Calcitonin injections.    Pranav Galindo MD  3/10/2019  1:52 PM

## 2019-03-10 NOTE — PLAN OF CARE
Problem: Patient Care Overview  Goal: Plan of Care Review  Outcome: Ongoing (interventions implemented as appropriate)    Goal: Discharge Needs Assessment  Outcome: Ongoing (interventions implemented as appropriate)    Goal: Interprofessional Rounds/Family Conf  Outcome: Ongoing (interventions implemented as appropriate)      Problem: Skin Injury Risk (Adult)  Goal: Identify Related Risk Factors and Signs and Symptoms  Outcome: Ongoing (interventions implemented as appropriate)    Goal: Skin Health and Integrity  Outcome: Ongoing (interventions implemented as appropriate)      Problem: Pain, Acute (Adult)  Goal: Identify Related Risk Factors and Signs and Symptoms  Outcome: Ongoing (interventions implemented as appropriate)    Goal: Acceptable Pain Control/Comfort Level  Outcome: Ongoing (interventions implemented as appropriate)

## 2019-03-11 LAB
ALBUMIN SERPL-MCNC: 3.5 G/DL (ref 3.5–5.2)
ANION GAP SERPL CALCULATED.3IONS-SCNC: 13.6 MMOL/L
BASOPHILS # BLD AUTO: 0.03 10*3/MM3 (ref 0–0.2)
BASOPHILS NFR BLD AUTO: 0.2 % (ref 0–1.5)
BUN BLD-MCNC: 18 MG/DL (ref 8–23)
BUN/CREAT SERPL: 25.4 (ref 7–25)
CALCIUM SPEC-SCNC: 11.6 MG/DL (ref 8.6–10.5)
CHLORIDE SERPL-SCNC: 102 MMOL/L (ref 98–107)
CO2 SERPL-SCNC: 21.4 MMOL/L (ref 22–29)
CREAT BLD-MCNC: 0.71 MG/DL (ref 0.57–1)
DEPRECATED RDW RBC AUTO: 42.4 FL (ref 37–54)
EOSINOPHIL # BLD AUTO: 0 10*3/MM3 (ref 0–0.4)
EOSINOPHIL NFR BLD AUTO: 0 % (ref 0.3–6.2)
ERYTHROCYTE [DISTWIDTH] IN BLOOD BY AUTOMATED COUNT: 13.6 % (ref 12.3–15.4)
GFR SERPL CREATININE-BSD FRML MDRD: 83 ML/MIN/1.73
GLUCOSE BLD-MCNC: 127 MG/DL (ref 65–99)
HCT VFR BLD AUTO: 46.2 % (ref 34–46.6)
HGB BLD-MCNC: 15.3 G/DL (ref 12–15.9)
IMM GRANULOCYTES # BLD AUTO: 0.25 10*3/MM3 (ref 0–0.05)
IMM GRANULOCYTES NFR BLD AUTO: 1.5 % (ref 0–0.5)
LYMPHOCYTES # BLD AUTO: 1.07 10*3/MM3 (ref 0.7–3.1)
LYMPHOCYTES NFR BLD AUTO: 6.3 % (ref 19.6–45.3)
MCH RBC QN AUTO: 28.6 PG (ref 26.6–33)
MCHC RBC AUTO-ENTMCNC: 33.1 G/DL (ref 31.5–35.7)
MCV RBC AUTO: 86.4 FL (ref 79–97)
MONOCYTES # BLD AUTO: 1.31 10*3/MM3 (ref 0.1–0.9)
MONOCYTES NFR BLD AUTO: 7.7 % (ref 5–12)
NEUTROPHILS # BLD AUTO: 14.3 10*3/MM3 (ref 1.4–7)
NEUTROPHILS NFR BLD AUTO: 84.3 % (ref 42.7–76)
NRBC BLD AUTO-RTO: 0 /100 WBC (ref 0–0)
PHOSPHATE SERPL-MCNC: 1.8 MG/DL (ref 2.5–4.5)
PLATELET # BLD AUTO: 232 10*3/MM3 (ref 140–450)
PMV BLD AUTO: 10.3 FL (ref 6–12)
POTASSIUM BLD-SCNC: 4.3 MMOL/L (ref 3.5–5.2)
RBC # BLD AUTO: 5.35 10*6/MM3 (ref 3.77–5.28)
SODIUM BLD-SCNC: 137 MMOL/L (ref 136–145)
WBC NRBC COR # BLD: 16.96 10*3/MM3 (ref 3.4–10.8)

## 2019-03-11 PROCEDURE — 25010000002 ONDANSETRON PER 1 MG: Performed by: INTERNAL MEDICINE

## 2019-03-11 PROCEDURE — 80069 RENAL FUNCTION PANEL: CPT | Performed by: INTERNAL MEDICINE

## 2019-03-11 PROCEDURE — 25010000002 FUROSEMIDE PER 20 MG: Performed by: INTERNAL MEDICINE

## 2019-03-11 PROCEDURE — 85025 COMPLETE CBC W/AUTO DIFF WBC: CPT | Performed by: HOSPITALIST

## 2019-03-11 PROCEDURE — 25010000002 DEXAMETHASONE PER 1 MG: Performed by: NURSE PRACTITIONER

## 2019-03-11 RX ADMIN — SODIUM CHLORIDE, PRESERVATIVE FREE 3 ML: 5 INJECTION INTRAVENOUS at 08:40

## 2019-03-11 RX ADMIN — HYDRALAZINE HYDROCHLORIDE 25 MG: 25 TABLET ORAL at 06:30

## 2019-03-11 RX ADMIN — FUROSEMIDE 20 MG: 10 INJECTION, SOLUTION INTRAMUSCULAR; INTRAVENOUS at 08:40

## 2019-03-11 RX ADMIN — DEXAMETHASONE SODIUM PHOSPHATE 4 MG: 4 INJECTION, SOLUTION INTRAMUSCULAR; INTRAVENOUS at 06:30

## 2019-03-11 RX ADMIN — ACETAMINOPHEN 650 MG: 325 TABLET, FILM COATED ORAL at 21:30

## 2019-03-11 RX ADMIN — POTASSIUM CHLORIDE 80 MEQ: 750 CAPSULE, EXTENDED RELEASE ORAL at 08:40

## 2019-03-11 RX ADMIN — HYDRALAZINE HYDROCHLORIDE 25 MG: 25 TABLET ORAL at 14:26

## 2019-03-11 RX ADMIN — DOCUSATE SODIUM 100 MG: 100 CAPSULE, LIQUID FILLED ORAL at 21:11

## 2019-03-11 RX ADMIN — SODIUM CHLORIDE, PRESERVATIVE FREE 3 ML: 5 INJECTION INTRAVENOUS at 21:12

## 2019-03-11 RX ADMIN — DEXAMETHASONE SODIUM PHOSPHATE 4 MG: 4 INJECTION, SOLUTION INTRAMUSCULAR; INTRAVENOUS at 12:20

## 2019-03-11 RX ADMIN — FAMOTIDINE 20 MG: 20 TABLET, FILM COATED ORAL at 08:40

## 2019-03-11 RX ADMIN — CINACALCET HYDROCHLORIDE 30 MG: 30 TABLET, COATED ORAL at 08:40

## 2019-03-11 RX ADMIN — AMLODIPINE BESYLATE 10 MG: 10 TABLET ORAL at 08:40

## 2019-03-11 RX ADMIN — ONDANSETRON HYDROCHLORIDE 4 MG: 2 SOLUTION INTRAMUSCULAR; INTRAVENOUS at 06:39

## 2019-03-11 RX ADMIN — FAMOTIDINE 20 MG: 20 TABLET, FILM COATED ORAL at 21:11

## 2019-03-11 RX ADMIN — HYDRALAZINE HYDROCHLORIDE 25 MG: 25 TABLET ORAL at 21:11

## 2019-03-11 RX ADMIN — SODIUM CHLORIDE 100 ML/HR: 9 INJECTION, SOLUTION INTRAVENOUS at 17:42

## 2019-03-11 RX ADMIN — DEXAMETHASONE SODIUM PHOSPHATE 4 MG: 4 INJECTION, SOLUTION INTRAMUSCULAR; INTRAVENOUS at 17:41

## 2019-03-11 NOTE — PROGRESS NOTES
"   LOS: 7 days    Patient Care Team:  Juarez Winters MD as PCP - General (Family Medicine)    Chief Complaint:    Chief Complaint   Patient presents with   • Headache   • Eye Pain   • Diplopia     Follow UP Hypercalcemia  Subjective     Interval History:     S:  Nervous about ongoing medical issues  O:  No soa or cp.  No n/v/d.  Objective     Vital Signs  Temp:  [97.3 °F (36.3 °C)-97.4 °F (36.3 °C)] 97.4 °F (36.3 °C)  Heart Rate:  [44-61] 48  Resp:  [18] 18  BP: (135-189)/(60-82) 151/72    Flowsheet Rows      First Filed Value   Admission Height  157.5 cm (62\") Documented at 03/04/2019 1050   Admission Weight  122 kg (270 lb) Documented at 03/04/2019 1050          No intake/output data recorded.  I/O last 3 completed shifts:  In: 2575 [P.O.:625; I.V.:1950]  Out: 8300 [Urine:8300]    Intake/Output Summary (Last 24 hours) at 3/11/2019 0731  Last data filed at 3/11/2019 0634  Gross per 24 hour   Intake 2575 ml   Output 6100 ml   Net -3525 ml       Physical Exam:  Exam:  GEN:               No distress, appears stated age  EYES:              Anicteric sclera  ENT:                External ears/nose normal, MM are moist  NECK:             No adenopathy, JVP none  LUNGS:           Normal chest on inspection; not labored  CV:                  Normal S1S2, without murmur  ABD:                Non-tender, non-distended, no hepatosplenomegaly, +BS  EXT:                No edema; no cyanosis; clubbing       Results Review:    Results from last 7 days   Lab Units 03/11/19  0538 03/10/19  0539 03/09/19  0522  03/05/19  0607   SODIUM mmol/L 137 140 140   < > 139   POTASSIUM mmol/L 4.3 4.5 4.2   < > 3.9   CHLORIDE mmol/L 102 107 109*   < > 103   CO2 mmol/L 21.4* 22.5 22.9   < > 24.7   BUN mg/dL 18 19 17   < > 10   CREATININE mg/dL 0.71 0.72 0.61   < > 0.83   CALCIUM mg/dL 11.6* 11.5* 12.0*   < > 13.0*   BILIRUBIN mg/dL  --   --   --   --  0.6   ALK PHOS U/L  --   --   --   --  114   ALT (SGPT) U/L  --   --   --   --  12   AST " (SGOT) U/L  --   --   --   --  11   GLUCOSE mg/dL 127* 126* 110*   < > 169*    < > = values in this interval not displayed.       Estimated Creatinine Clearance: 103.2 mL/min (by C-G formula based on SCr of 0.71 mg/dL).    Results from last 7 days   Lab Units 03/11/19  0538 03/05/19  0607   MAGNESIUM mg/dL  --  1.8   PHOSPHORUS mg/dL 1.8*  --              Results from last 7 days   Lab Units 03/11/19 0538 03/10/19  0539 03/09/19  0522 03/08/19  0522 03/05/19  0607   WBC 10*3/mm3 16.96* 15.39* 16.11* 13.54* 9.40   HEMOGLOBIN g/dL 15.3 14.3 15.2 14.0 15.2   PLATELETS 10*3/mm3 232 214 250 226 228       Results from last 7 days   Lab Units 03/05/19  0607   INR  1.04         Imaging Results (last 24 hours)     ** No results found for the last 24 hours. **          amLODIPine 10 mg Oral Q24H   cinacalcet 30 mg Oral Daily With Breakfast   dexamethasone 4 mg Intravenous Q6H   docusate sodium 100 mg Oral BID   famotidine 20 mg Oral BID   furosemide 20 mg Intravenous Daily   hydrALAZINE 25 mg Oral Q8H   potassium chloride 80 mEq Oral Daily   sodium chloride 3 mL Intravenous Q12H       sodium chloride 100 mL/hr Last Rate: 100 mL/hr (03/10/19 2028)       Medication Review:   Current Facility-Administered Medications   Medication Dose Route Frequency Provider Last Rate Last Dose   • acetaminophen (TYLENOL) tablet 650 mg  650 mg Oral Q4H PRN Alice Simmons MD   650 mg at 03/10/19 1758   • amLODIPine (NORVASC) tablet 10 mg  10 mg Oral Q24H Carol Ann Johnson APRN   10 mg at 03/10/19 0928   • cinacalcet (SENSIPAR) tablet 30 mg  30 mg Oral Daily With Breakfast So Foley MD   30 mg at 03/10/19 1845   • dexamethasone (DECADRON) injection 4 mg  4 mg Intravenous Q6H Elisabet Skinner APRN   4 mg at 03/10/19 2324   • docusate sodium (COLACE) capsule 100 mg  100 mg Oral BID Pranav Galindo MD   100 mg at 03/08/19 0923   • famotidine (PEPCID) tablet 20 mg  20 mg Oral BID Elisabet Skinner APRN   20 mg at 03/10/19 0439   •  furosemide (LASIX) injection 20 mg  20 mg Intravenous Daily So Foley MD       • hydrALAZINE (APRESOLINE) injection 10 mg  10 mg Intravenous Q6H PRN Carol Ann Johnson APRN       • hydrALAZINE (APRESOLINE) tablet 25 mg  25 mg Oral Q8H So Foley MD   25 mg at 03/10/19 2249   • HYDROcodone-acetaminophen (NORCO) 7.5-325 MG per tablet 1 tablet  1 tablet Oral Q4H PRN Surjit Ellsworth MD   1 tablet at 03/05/19 2254   • HYDROmorphone (DILAUDID) injection 0.5 mg  0.5 mg Intravenous Q2H PRN Alice Simmons MD        And   • naloxone (NARCAN) injection 0.4 mg  0.4 mg Intravenous Q5 Min PRN Alice Simmons MD       • nitroglycerin (NITROSTAT) SL tablet 0.4 mg  0.4 mg Sublingual Q5 Min PRN Alice Simmons MD       • ondansetron (ZOFRAN) injection 4 mg  4 mg Intravenous Q6H PRN Alice iSmmons MD   4 mg at 03/11/19 0639   • potassium chloride (MICRO-K) CR capsule 80 mEq  80 mEq Oral Daily Ty Lofton MD   80 mEq at 03/10/19 0928   • sodium chloride 0.9 % flush 10 mL  10 mL Intravenous PRN Bull Branch II, MD       • sodium chloride 0.9 % flush 3 mL  3 mL Intravenous Q12H Alice Simmons MD   3 mL at 03/10/19 0930   • sodium chloride 0.9 % flush 3-10 mL  3-10 mL Intravenous PRN Alice Simmons MD       • sodium chloride 0.9 % infusion  100 mL/hr Intravenous Continuous So Foley  mL/hr at 03/10/19 2028 100 mL/hr at 03/10/19 2028       Assessment/Plan         Diplopia    Cerebral edema (CMS/HCC)    Meningioma (CMS/HCC)    AVF (arteriovenous fistula) (CMS/HCC)    6th nerve palsy, left    Hypercalcemia    Hyperparathyroidism (CMS/HCC)    1. Hypercalcemia:  thiazide, hyperparathyroidism all contributing etiologies.  Pending parathyroid related peptide and free serum light chain.  Slowly improving calcium 11.6.       2. Hypertension- Hold Thiazide.  Uncontrolled.  Bradycardia noted  3. Intracranial meningioma with cerebral edema and 6th nerve palsy  4.  Severe obesity:  BMI  52     Plan:     Continue calcitonin   Work-up for hyperparathyroidism  Continue Lasix and IVF for now  Continue  sensipar 30mg daily to try to get better control  Surveillance       Ty Lofton MD  03/11/19  7:31 AM

## 2019-03-11 NOTE — PLAN OF CARE
Problem: Patient Care Overview  Goal: Plan of Care Review  Outcome: Ongoing (interventions implemented as appropriate)   03/11/19 7389   Coping/Psychosocial   Plan of Care Reviewed With patient   OTHER   Outcome Summary Norvasc dose increased & started on Hydralazine po tid/IVF's continue @ dec rate/HR low as 40, MD's aware/no c/o's this shift/will continue to monitor   Plan of Care Review   Progress improving       Problem: Skin Injury Risk (Adult)  Goal: Identify Related Risk Factors and Signs and Symptoms  Outcome: Outcome(s) achieved Date Met: 03/11/19    Goal: Skin Health and Integrity  Outcome: Ongoing (interventions implemented as appropriate)      Problem: Pain, Acute (Adult)  Goal: Identify Related Risk Factors and Signs and Symptoms  Outcome: Outcome(s) achieved Date Met: 03/11/19    Goal: Acceptable Pain Control/Comfort Level  Outcome: Ongoing (interventions implemented as appropriate)

## 2019-03-11 NOTE — PROGRESS NOTES
"DAILY PROGRESS NOTE  [facility]    Patient Identification:  Name: Joan Almanza  Age: 64 y.o.  Sex: female  :  1955  MRN: [id number]         Primary Care Physician: Juarez Winters MD    Subjective:  Interval History:She is still having diplopia and elevated calcium.  Headache and nausea.    Objective:    Scheduled Meds:    amLODIPine 10 mg Oral Q24H   cinacalcet 30 mg Oral Daily With Breakfast   dexamethasone 4 mg Intravenous Q6H   docusate sodium 100 mg Oral BID   famotidine 20 mg Oral BID   furosemide 20 mg Intravenous Daily   hydrALAZINE 25 mg Oral Q8H   potassium chloride 80 mEq Oral Daily   sodium chloride 3 mL Intravenous Q12H     Continuous Infusions:    sodium chloride 100 mL/hr Last Rate: 100 mL/hr (03/10/19 2028)       Vital signs in last 24 hours:  Temp:  [97.3 °F (36.3 °C)-97.4 °F (36.3 °C)] 97.4 °F (36.3 °C)  Heart Rate:  [44-61] 48  Resp:  [18] 18  BP: (135-189)/(60-82) 151/72    Intake/Output:    Intake/Output Summary (Last 24 hours) at 3/11/2019 1314  Last data filed at 3/11/2019 0634  Gross per 24 hour   Intake 1575 ml   Output 3200 ml   Net -1625 ml       Exam:  /72 (BP Location: Left arm, Patient Position: Lying)   Pulse (!) 48   Temp 97.4 °F (36.3 °C) (Oral)   Resp 18   Ht 157.5 cm (62\")   Wt 129 kg (285 lb)   SpO2 97%   BMI 52.13 kg/m²     General Appearance:    Alert, cooperative, no distress   Head:    Normocephalic, without obvious abnormality, atraumatic   Eyes:       Throat:   Lips, tongue, gums normal   Neck:   Supple, symmetrical, trachea midline, no JVD   Lungs:     Clear to auscultation bilaterally, respirations unlabored   Chest Wall:    No tenderness or deformity    Heart:    Regular rate and rhythm, S1 and S2 normal, no murmur,no  Rub or gallop   Abdomen:     Soft, non-tender, bowel sounds active, no masses, no organomegaly    Extremities:   Extremities normal, atraumatic, no cyanosis or edema   Pulses:      Skin:   Skin is warm and dry,  no " rashes or palpable lesions   Neurologic:   no focal deficits noted      Lab Results (last 72 hours)     Procedure Component Value Units Date/Time    Immunofixation, Serum [[id number]]  (Abnormal) Collected:  03/05/19 1634    Specimen:  Blood Updated:  03/07/19 1515     Immunofixation Result, Serum Comment:     Comment: Presence of monoclonal protein is unclear at this time. Suggest  repeat in 3 to 6 months if clinically indicated.        IgG 1180 mg/dL      IgA 614 mg/dL      IgM 84 mg/dL     Narrative:       Performed at:  [address] [place] [address]  [id number]  : Huseyin Calderon PhD, Phone:  [phone]    Basic Metabolic Panel [[id number]]  (Abnormal) Collected:  03/06/19 1040    Specimen:  Blood Updated:  03/06/19 1138     Glucose 115 mg/dL      BUN 20 mg/dL      Creatinine 0.98 mg/dL      Sodium 139 mmol/L      Potassium 4.9 mmol/L      Chloride 100 mmol/L      CO2 26.8 mmol/L      Calcium 13.3 mg/dL      eGFR Non African Amer 57 mL/min/1.73      BUN/Creatinine Ratio 20.4     Anion Gap 12.2 mmol/L     Narrative:       GFR Normal >60  Chronic Kidney Disease <60  Kidney Failure <15    Vitamin D 25 Hydroxy [[id number]]  (Normal) Collected:  03/05/19 1634    Specimen:  Blood Updated:  03/05/19 1742     25 Hydroxy, Vitamin D 36.2 ng/ml     Narrative:       Reference Range for Total Vitamin D 25(OH)     Deficiency <20.0 ng/mL   Insufficiency 21-29 ng/mL   Sufficiency  ng/mL  Toxicity >100 ng/ml    PTH, Intact [[id number]]  (Abnormal) Collected:  03/05/19 1634    Specimen:  Blood Updated:  03/05/19 1737     PTH, Intact 172.0 pg/mL     Immunofixation, Urine - [[id number]] Collected:  03/05/19 1722    Specimen:  Urine Updated:  03/05/19 1727    Immunoglobulin Free LT Chains Blood [[id number]] Collected:  03/05/19 1634    Specimen:  Blood Updated:  03/05/19 1657    PTH-related Peptide [[id number]] Collected:  03/05/19 1634    Specimen:  Blood Updated:  03/05/19 1657    Hemoglobin A1c  [[id number]]  (Normal) Collected:  03/05/19 0607    Specimen:  Blood Updated:  03/05/19 0847     Hemoglobin A1C 5.00 %     Narrative:       Hemoglobin A1C Ranges:    Increased Risk for Diabetes  5.7% to 6.4%  Diabetes                     >= 6.5%  Diabetic Goal                < 7.0%    Comprehensive Metabolic Panel [[id number]]  (Abnormal) Collected:  03/05/19 0607    Specimen:  Blood Updated:  03/05/19 0658     Glucose 169 mg/dL      BUN 10 mg/dL      Creatinine 0.83 mg/dL      Sodium 139 mmol/L      Potassium 3.9 mmol/L      Chloride 103 mmol/L      CO2 24.7 mmol/L      Calcium 13.0 mg/dL      Total Protein 7.9 g/dL      Albumin 4.10 g/dL      ALT (SGPT) 12 U/L      AST (SGOT) 11 U/L      Alkaline Phosphatase 114 U/L      Total Bilirubin 0.6 mg/dL      eGFR Non African Amer 69 mL/min/1.73      Globulin 3.8 gm/dL      A/G Ratio 1.1 g/dL      BUN/Creatinine Ratio 12.0     Anion Gap 11.3 mmol/L     Narrative:       GFR Normal >60  Chronic Kidney Disease <60  Kidney Failure <15    Magnesium [[id number]]  (Normal) Collected:  03/05/19 0607    Specimen:  Blood Updated:  03/05/19 0652     Magnesium 1.8 mg/dL     Protime-INR [[id number]]  (Normal) Collected:  03/05/19 0607    Specimen:  Blood Updated:  03/05/19 0645     Protime 13.4 Seconds      INR 1.04    CBC Auto Differential [[id number]]  (Abnormal) Collected:  03/05/19 0607    Specimen:  Blood Updated:  03/05/19 0628     WBC 9.40 10*3/mm3      RBC 5.19 10*6/mm3      Hemoglobin 15.2 g/dL      Hematocrit 44.9 %      MCV 86.5 fL      MCH 29.3 pg      MCHC 33.9 g/dL      RDW 13.6 %      RDW-SD 42.4 fl      MPV 9.9 fL      Platelets 228 10*3/mm3      Neutrophil % 89.3 %      Lymphocyte % 7.3 %      Monocyte % 1.9 %      Eosinophil % 0.0 %      Basophil % 0.1 %      Immature Grans % 1.4 %      Neutrophils, Absolute 8.39 10*3/mm3      Lymphocytes, Absolute 0.69 10*3/mm3      Monocytes, Absolute 0.18 10*3/mm3      Eosinophils, Absolute 0.00 10*3/mm3      Basophils, Absolute 0.01  10*3/mm3      Immature Grans, Absolute 0.13 10*3/mm3      nRBC 0.0 /100 WBC         Data Review:  Results from last 7 days   Lab Units 03/11/19  0538 03/10/19  0539 03/09/19  0522   SODIUM mmol/L 137 140 140   POTASSIUM mmol/L 4.3 4.5 4.2   CHLORIDE mmol/L 102 107 109*   CO2 mmol/L 21.4* 22.5 22.9   BUN mg/dL 18 19 17   CREATININE mg/dL 0.71 0.72 0.61   GLUCOSE mg/dL 127* 126* 110*   CALCIUM mg/dL 11.6* 11.5* 12.0*     Results from last 7 days   Lab Units 03/11/19  0538 03/10/19  0539 03/09/19  0522   WBC 10*3/mm3 16.96* 15.39* 16.11*   HEMOGLOBIN g/dL 15.3 14.3 15.2   HEMATOCRIT % 46.2 43.0 45.0   PLATELETS 10*3/mm3 232 214 250         Results from last 7 days   Lab Units 03/05/19  0607   HEMOGLOBIN A1C % 5.00     No results found for: TROPONINT      Results from last 7 days   Lab Units 03/05/19  0607   ALK PHOS U/L 114   BILIRUBIN mg/dL 0.6   ALT (SGPT) U/L 12   AST (SGOT) U/L 11         Results from last 7 days   Lab Units 03/05/19  0607   HEMOGLOBIN A1C % 5.00     No results found for: POCGLU  Results from last 7 days   Lab Units 03/05/19  0607   INR  1.04       Past Medical History:   Diagnosis Date   • Morbid obesity (CMS/HCC)    • Ovarian cyst    • Tachycardia        Assessment:  Active Hospital Problems    Diagnosis  POA   • **Diplopia [H53.2]  Unknown   • Hypercalcemia [E83.52]  Unknown   • Hyperparathyroidism (CMS/HCC) [E21.3]  Unknown   • Cerebral edema (CMS/HCC) [G93.6]  Yes   • Meningioma (CMS/HCC) [D32.9]  Yes   • AVF (arteriovenous fistula) (CMS/HCC) [I77.0]  Yes   • 6th nerve palsy, 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       Plan:  Continue with IV fluid hydration . Follow up labs. As per nephrology and neurosurgery. Calcitonin injections. Follow up with neurosurgery in 1 month.    Pranav Galindo MD  3/11/2019  1:14 PM

## 2019-03-12 ENCOUNTER — APPOINTMENT (OUTPATIENT)
Dept: GENERAL RADIOLOGY | Facility: HOSPITAL | Age: 64
End: 2019-03-12

## 2019-03-12 ENCOUNTER — HOSPITAL ENCOUNTER (OUTPATIENT)
Facility: HOSPITAL | Age: 64
Setting detail: SURGERY ADMIT
End: 2019-03-12
Attending: SURGERY | Admitting: SURGERY

## 2019-03-12 ENCOUNTER — APPOINTMENT (OUTPATIENT)
Dept: NUCLEAR MEDICINE | Facility: HOSPITAL | Age: 64
End: 2019-03-12

## 2019-03-12 PROBLEM — E66.01 MORBID OBESITY (HCC): Status: ACTIVE | Noted: 2019-03-12

## 2019-03-12 LAB
ABO GROUP BLD: NORMAL
ANION GAP SERPL CALCULATED.3IONS-SCNC: 13.5 MMOL/L
BLD GP AB SCN SERPL QL: NEGATIVE
BUN BLD-MCNC: 21 MG/DL (ref 8–23)
BUN/CREAT SERPL: 24.4 (ref 7–25)
CALCIUM SPEC-SCNC: 12.2 MG/DL (ref 8.6–10.5)
CHLORIDE SERPL-SCNC: 102 MMOL/L (ref 98–107)
CO2 SERPL-SCNC: 23.5 MMOL/L (ref 22–29)
CREAT BLD-MCNC: 0.86 MG/DL (ref 0.57–1)
GFR SERPL CREATININE-BSD FRML MDRD: 66 ML/MIN/1.73
GLUCOSE BLD-MCNC: 134 MG/DL (ref 65–99)
POTASSIUM BLD-SCNC: 4.2 MMOL/L (ref 3.5–5.2)
RH BLD: POSITIVE
SODIUM BLD-SCNC: 139 MMOL/L (ref 136–145)
T&S EXPIRATION DATE: NORMAL

## 2019-03-12 PROCEDURE — A9500 TC99M SESTAMIBI: HCPCS | Performed by: SURGERY

## 2019-03-12 PROCEDURE — 86900 BLOOD TYPING SEROLOGIC ABO: CPT | Performed by: SURGERY

## 2019-03-12 PROCEDURE — 25010000002 DEXAMETHASONE PER 1 MG: Performed by: NURSE PRACTITIONER

## 2019-03-12 PROCEDURE — 86850 RBC ANTIBODY SCREEN: CPT | Performed by: SURGERY

## 2019-03-12 PROCEDURE — 71046 X-RAY EXAM CHEST 2 VIEWS: CPT

## 2019-03-12 PROCEDURE — 80048 BASIC METABOLIC PNL TOTAL CA: CPT

## 2019-03-12 PROCEDURE — 63710000001 METHYLPREDNISOLONE 4 MG TABLET THERAPY PACK 21 EACH DISP PACK: Performed by: NURSE PRACTITIONER

## 2019-03-12 PROCEDURE — 99222 1ST HOSP IP/OBS MODERATE 55: CPT | Performed by: SURGERY

## 2019-03-12 PROCEDURE — 25010000002 FUROSEMIDE PER 20 MG: Performed by: INTERNAL MEDICINE

## 2019-03-12 PROCEDURE — 78071 PARATHYRD PLANAR W/WO SUBTRJ: CPT

## 2019-03-12 PROCEDURE — 99232 SBSQ HOSP IP/OBS MODERATE 35: CPT | Performed by: NURSE PRACTITIONER

## 2019-03-12 PROCEDURE — 0 TECHNETIUM SESTAMIBI: Performed by: SURGERY

## 2019-03-12 PROCEDURE — 86901 BLOOD TYPING SEROLOGIC RH(D): CPT | Performed by: SURGERY

## 2019-03-12 RX ORDER — METHYLPREDNISOLONE 4 MG/1
4 TABLET ORAL
Status: COMPLETED | OUTPATIENT
Start: 2019-03-17 | End: 2019-03-17

## 2019-03-12 RX ORDER — METHYLPREDNISOLONE 4 MG/1
4 TABLET ORAL
Status: ACTIVE | OUTPATIENT
Start: 2019-03-13 | End: 2019-03-14

## 2019-03-12 RX ORDER — METHYLPREDNISOLONE 4 MG/1
4 TABLET ORAL
Status: COMPLETED | OUTPATIENT
Start: 2019-03-12 | End: 2019-03-12

## 2019-03-12 RX ORDER — METHYLPREDNISOLONE 4 MG/1
8 TABLET ORAL
Status: COMPLETED | OUTPATIENT
Start: 2019-03-12 | End: 2019-03-12

## 2019-03-12 RX ORDER — LORAZEPAM 0.5 MG/1
0.25 TABLET ORAL NIGHTLY PRN
Status: DISPENSED | OUTPATIENT
Start: 2019-03-12 | End: 2019-03-13

## 2019-03-12 RX ORDER — METHYLPREDNISOLONE 4 MG/1
4 TABLET ORAL
Status: COMPLETED | OUTPATIENT
Start: 2019-03-16 | End: 2019-03-16

## 2019-03-12 RX ORDER — METHYLPREDNISOLONE 4 MG/1
4 TABLET ORAL
Status: COMPLETED | OUTPATIENT
Start: 2019-03-15 | End: 2019-03-15

## 2019-03-12 RX ORDER — METHYLPREDNISOLONE 4 MG/1
8 TABLET ORAL
Status: DISCONTINUED | OUTPATIENT
Start: 2019-03-13 | End: 2019-03-13

## 2019-03-12 RX ORDER — METHYLPREDNISOLONE 4 MG/1
8 TABLET ORAL
Status: COMPLETED | OUTPATIENT
Start: 2019-03-13 | End: 2019-03-13

## 2019-03-12 RX ORDER — METHYLPREDNISOLONE 4 MG/1
4 TABLET ORAL
Status: COMPLETED | OUTPATIENT
Start: 2019-03-14 | End: 2019-03-14

## 2019-03-12 RX ADMIN — SODIUM CHLORIDE 100 ML/HR: 9 INJECTION, SOLUTION INTRAVENOUS at 03:16

## 2019-03-12 RX ADMIN — SODIUM CHLORIDE, PRESERVATIVE FREE 3 ML: 5 INJECTION INTRAVENOUS at 09:41

## 2019-03-12 RX ADMIN — HYDRALAZINE HYDROCHLORIDE 25 MG: 25 TABLET ORAL at 06:30

## 2019-03-12 RX ADMIN — ACETAMINOPHEN 650 MG: 325 TABLET, FILM COATED ORAL at 17:43

## 2019-03-12 RX ADMIN — METHYLPREDNISOLONE 4 MG: 4 TABLET ORAL at 19:06

## 2019-03-12 RX ADMIN — SODIUM CHLORIDE 100 ML/HR: 9 INJECTION, SOLUTION INTRAVENOUS at 17:30

## 2019-03-12 RX ADMIN — HYDRALAZINE HYDROCHLORIDE 25 MG: 25 TABLET ORAL at 21:31

## 2019-03-12 RX ADMIN — METHYLPREDNISOLONE 8 MG: 4 TABLET ORAL at 21:31

## 2019-03-12 RX ADMIN — DOCUSATE SODIUM 100 MG: 100 CAPSULE, LIQUID FILLED ORAL at 09:40

## 2019-03-12 RX ADMIN — POTASSIUM CHLORIDE 80 MEQ: 750 CAPSULE, EXTENDED RELEASE ORAL at 09:40

## 2019-03-12 RX ADMIN — TECHNETIUM TC 99M SESTAMIBI 1 DOSE: 1 INJECTION INTRAVENOUS at 12:40

## 2019-03-12 RX ADMIN — FAMOTIDINE 20 MG: 20 TABLET, FILM COATED ORAL at 09:40

## 2019-03-12 RX ADMIN — FAMOTIDINE 20 MG: 20 TABLET, FILM COATED ORAL at 21:31

## 2019-03-12 RX ADMIN — LORAZEPAM 0.25 MG: 0.5 TABLET ORAL at 23:14

## 2019-03-12 RX ADMIN — FUROSEMIDE 20 MG: 10 INJECTION, SOLUTION INTRAMUSCULAR; INTRAVENOUS at 09:41

## 2019-03-12 RX ADMIN — DEXAMETHASONE SODIUM PHOSPHATE 4 MG: 4 INJECTION, SOLUTION INTRAMUSCULAR; INTRAVENOUS at 06:30

## 2019-03-12 RX ADMIN — SODIUM CHLORIDE, PRESERVATIVE FREE 3 ML: 5 INJECTION INTRAVENOUS at 21:31

## 2019-03-12 RX ADMIN — DEXAMETHASONE SODIUM PHOSPHATE 4 MG: 4 INJECTION, SOLUTION INTRAMUSCULAR; INTRAVENOUS at 00:25

## 2019-03-12 RX ADMIN — CINACALCET HYDROCHLORIDE 30 MG: 30 TABLET, COATED ORAL at 09:41

## 2019-03-12 RX ADMIN — METHYLPREDNISOLONE 4 MG: 4 TABLET ORAL at 17:32

## 2019-03-12 RX ADMIN — AMLODIPINE BESYLATE 10 MG: 10 TABLET ORAL at 09:40

## 2019-03-12 NOTE — PROGRESS NOTES
"   LOS: 8 days    Patient Care Team:  Juarez Winters MD as PCP - General (Family Medicine)    Chief Complaint:    Chief Complaint   Patient presents with   • Headache   • Eye Pain   • Diplopia     Follow UP Hypercalcemia  Subjective     Interval History:     S:  Mel nd examined. No SOA or CP    Objective     Vital Signs  Temp:  [97.5 °F (36.4 °C)-97.8 °F (36.6 °C)] 97.7 °F (36.5 °C)  Heart Rate:  [48-81] 54  Resp:  [16-18] 18  BP: (124-148)/(52-81) 147/81    Flowsheet Rows      First Filed Value   Admission Height  157.5 cm (62\") Documented at 03/04/2019 1050   Admission Weight  122 kg (270 lb) Documented at 03/04/2019 1050          No intake/output data recorded.  I/O last 3 completed shifts:  In: 2815 [P.O.:865; I.V.:1950]  Out: 5300 [Urine:5300]    Intake/Output Summary (Last 24 hours) at 3/12/2019 0841  Last data filed at 3/12/2019 0316  Gross per 24 hour   Intake 1240 ml   Output 2100 ml   Net -860 ml       Physical Exam:  Exam:  GEN:               No distress, appears stated age  EYES:              Anicteric sclera  ENT:                External ears/nose normal, MM are moist  NECK:             No adenopathy, JVP none  LUNGS:           Normal chest on inspection; not labored  CV:                  Normal S1S2, without murmur  ABD:                Non-tender, non-distended, no hepatosplenomegaly, +BS  EXT:                No edema; no cyanosis; clubbing       Results Review:    Results from last 7 days   Lab Units 03/11/19  0538 03/10/19  0539 03/09/19  0522   SODIUM mmol/L 137 140 140   POTASSIUM mmol/L 4.3 4.5 4.2   CHLORIDE mmol/L 102 107 109*   CO2 mmol/L 21.4* 22.5 22.9   BUN mg/dL 18 19 17   CREATININE mg/dL 0.71 0.72 0.61   CALCIUM mg/dL 11.6* 11.5* 12.0*   GLUCOSE mg/dL 127* 126* 110*       Estimated Creatinine Clearance: 103.2 mL/min (by C-G formula based on SCr of 0.71 mg/dL).    Results from last 7 days   Lab Units 03/11/19  0538   PHOSPHORUS mg/dL 1.8*             Results from last 7 days   Lab " Units 03/11/19  0538 03/10/19  0539 03/09/19  0522 03/08/19  0522   WBC 10*3/mm3 16.96* 15.39* 16.11* 13.54*   HEMOGLOBIN g/dL 15.3 14.3 15.2 14.0   PLATELETS 10*3/mm3 232 214 250 226               Imaging Results (last 24 hours)     ** No results found for the last 24 hours. **          amLODIPine 10 mg Oral Q24H   cinacalcet 30 mg Oral Daily With Breakfast   dexamethasone 4 mg Intravenous Q6H   docusate sodium 100 mg Oral BID   famotidine 20 mg Oral BID   furosemide 20 mg Intravenous Daily   hydrALAZINE 25 mg Oral Q8H   potassium chloride 80 mEq Oral Daily   sodium chloride 3 mL Intravenous Q12H       sodium chloride 100 mL/hr Last Rate: 100 mL/hr (03/12/19 0316)       Medication Review:   Current Facility-Administered Medications   Medication Dose Route Frequency Provider Last Rate Last Dose   • acetaminophen (TYLENOL) tablet 650 mg  650 mg Oral Q4H PRN Alice Simmons MD   650 mg at 03/11/19 2130   • amLODIPine (NORVASC) tablet 10 mg  10 mg Oral Q24H Carol Ann Johnson APRN   10 mg at 03/11/19 0840   • cinacalcet (SENSIPAR) tablet 30 mg  30 mg Oral Daily With Breakfast So Foley MD   30 mg at 03/11/19 0840   • dexamethasone (DECADRON) injection 4 mg  4 mg Intravenous Q6H Elisabet Skinner APRN   4 mg at 03/12/19 0630   • docusate sodium (COLACE) capsule 100 mg  100 mg Oral BID Pranav Galindo MD   100 mg at 03/11/19 2111   • famotidine (PEPCID) tablet 20 mg  20 mg Oral BID Elisabet Skinner APRN   20 mg at 03/11/19 2111   • furosemide (LASIX) injection 20 mg  20 mg Intravenous Daily So Foley MD   20 mg at 03/11/19 0840   • hydrALAZINE (APRESOLINE) injection 10 mg  10 mg Intravenous Q6H PRN Carol Ann Johnson APRN       • hydrALAZINE (APRESOLINE) tablet 25 mg  25 mg Oral Q8H So Foley MD   25 mg at 03/12/19 0630   • HYDROcodone-acetaminophen (NORCO) 7.5-325 MG per tablet 1 tablet  1 tablet Oral Q4H PRN Surjit Ellsworth MD   1 tablet at 03/05/19 3077   • HYDROmorphone  (DILAUDID) injection 0.5 mg  0.5 mg Intravenous Q2H PRN Alice Simmons MD        And   • naloxone (NARCAN) injection 0.4 mg  0.4 mg Intravenous Q5 Min PRN Alice Simmons MD       • nitroglycerin (NITROSTAT) SL tablet 0.4 mg  0.4 mg Sublingual Q5 Min PRN Alice Simmons MD       • ondansetron (ZOFRAN) injection 4 mg  4 mg Intravenous Q6H PRN Alice Simmons MD   4 mg at 03/11/19 0639   • potassium chloride (MICRO-K) CR capsule 80 mEq  80 mEq Oral Daily Ty Lofton MD   80 mEq at 03/11/19 0840   • sodium chloride 0.9 % flush 10 mL  10 mL Intravenous PRN Bull Branch II, MD       • sodium chloride 0.9 % flush 3 mL  3 mL Intravenous Q12H Alice Simmons MD   3 mL at 03/11/19 2112   • sodium chloride 0.9 % flush 3-10 mL  3-10 mL Intravenous PRN Alice Simmons MD       • sodium chloride 0.9 % infusion  100 mL/hr Intravenous Continuous So Foley  mL/hr at 03/12/19 0316 100 mL/hr at 03/12/19 0316       Assessment/Plan         Diplopia    Cerebral edema (CMS/HCC)    Meningioma (CMS/HCC)    AVF (arteriovenous fistula) (CMS/HCC)    6th nerve palsy, left    Hypercalcemia    Hyperparathyroidism (CMS/HCC)    1. Hypercalcemia:  thiazide, hyperparathyroidism all contributing etiologies.  Pending parathyroid related peptide and free serum light chain.  Slowly improving calcium 11.6.       2. Hypertension- Hold Thiazide.  Uncontrolled.  Bradycardia noted  3. Intracranial meningioma with cerebral edema and 6th nerve palsy  4.  Severe obesity:  BMI 52     Plan:     PTH is high despite hypercalcemia. PTH-RP is negative. She will need parathyroid surgery at some point  Ill consult Dr Reynoso  Continue sensipar though doubt she will be able to afford it  Continue Lasix and IVF for now  Surveillance       Ty Lofton MD  03/12/19  8:41 AM

## 2019-03-12 NOTE — NURSING NOTE
SL in right forearm swollen and complain of stinging when flushed for nuc med study. Removed with angio intact and pressure dressing applied.

## 2019-03-12 NOTE — PLAN OF CARE
Problem: Patient Care Overview  Goal: Plan of Care Review  Outcome: Ongoing (interventions implemented as appropriate)   03/12/19 6327   Coping/Psychosocial   Plan of Care Reviewed With patient   OTHER   Outcome Summary Pt is alert and oriented x4.Up ad anson to the bathroom.Pt c/o back pain PRN Tylenol given with relief noted.Possible d/c today.Just waiting for Ca to be lower than 10.   Plan of Care Review   Progress no change       Problem: Skin Injury Risk (Adult)  Goal: Skin Health and Integrity  Outcome: Ongoing (interventions implemented as appropriate)      Problem: Pain, Acute (Adult)  Goal: Acceptable Pain Control/Comfort Level  Outcome: Ongoing (interventions implemented as appropriate)

## 2019-03-12 NOTE — CONSULTS
SURGERY  Date of Visit: 03/12/19  Date of Admission:3/4/2019 11:18 AM     Reason for Consult/Admission:  hypercalcemia    Patient's Chief Complaint: diplopia    HPI    Patient is a very nice 64 y.o. female who i've been asked to see by Dr Stephens for hypercalcemia.  She was told 3 years ago that her calcium was high, but she didn't follow up with that due to insurance issues.  I can't get those records but those available thru both here and [facility] show calciums as high as 13.3 over the last couple of years.  Her phosphorus is 1.8, creatinine 0.71,  and vit D 36.    She has never had a kidney stone, pancreatitis, or fatigue.  She is having trouble with bone aches, muscle aches, and difficulty concentrating.      Her mother had a goiter but no known parathyroid problems.    As far as operative risk, her BMI is 52, and she relates a post op anesthetic ventilating issue, adding that she can't sleep on her back.  She does not have shortness of breath, since she has lost 150#.  Cardiology is following.  I have contacted Dr Maldonado after seeing her and he thinks she is clear from a cardiac standpoint.    Past Medical History:   Diagnosis Date   • Morbid obesity (CMS/HCC)    • Ovarian cyst    • Tachycardia      Past Surgical History:   Procedure Laterality Date   • ADENOIDECTOMY     • CHOLECYSTECTOMY     • OVARY SURGERY Right    • TONSILLECTOMY       History reviewed. No pertinent family history.  Social History     Socioeconomic History   • Marital status:      Spouse name: Not on file   • Number of children: Not on file   • Years of education: Not on file   • Highest education level: Not on file   Social Needs   • Financial resource strain: Not on file   • Food insecurity - worry: Not on file   • Food insecurity - inability: Not on file   • Transportation needs - medical: Not on file   • Transportation needs - non-medical: Not on file   Occupational History   • Not on file   Tobacco Use   • Smoking status:  Never Smoker   Substance and Sexual Activity   • Alcohol use: No     Frequency: Never   • Drug use: No   • Sexual activity: Defer   Other Topics Concern   • Not on file   Social History Narrative   • Not on file         Current Facility-Administered Medications:   •  acetaminophen (TYLENOL) tablet 650 mg, 650 mg, Oral, Q4H PRN, Alice Simmons MD, 650 mg at 03/11/19 2130  •  amLODIPine (NORVASC) tablet 10 mg, 10 mg, Oral, Q24H, Carol Ann Johnson, APRN, 10 mg at 03/12/19 0940  •  cinacalcet (SENSIPAR) tablet 30 mg, 30 mg, Oral, Daily With Breakfast, So Foley MD, 30 mg at 03/12/19 0941  •  dexamethasone (DECADRON) injection 4 mg, 4 mg, Intravenous, Q6H, Elisabet Skinner APRN, 4 mg at 03/12/19 0630  •  docusate sodium (COLACE) capsule 100 mg, 100 mg, Oral, BID, Pranav Galindo MD, 100 mg at 03/12/19 0940  •  famotidine (PEPCID) tablet 20 mg, 20 mg, Oral, BID, Elisabet Skinner APRN, 20 mg at 03/12/19 0940  •  furosemide (LASIX) injection 20 mg, 20 mg, Intravenous, Daily, So Foley MD, 20 mg at 03/12/19 0941  •  hydrALAZINE (APRESOLINE) injection 10 mg, 10 mg, Intravenous, Q6H PRN, Carol Ann Johnson APRN  •  hydrALAZINE (APRESOLINE) tablet 25 mg, 25 mg, Oral, Q8H, So Foley MD, 25 mg at 03/12/19 0630  •  HYDROcodone-acetaminophen (NORCO) 7.5-325 MG per tablet 1 tablet, 1 tablet, Oral, Q4H PRN, Surjit Ellsworth MD, 1 tablet at 03/05/19 9051  •  HYDROmorphone (DILAUDID) injection 0.5 mg, 0.5 mg, Intravenous, Q2H PRN **AND** naloxone (NARCAN) injection 0.4 mg, 0.4 mg, Intravenous, Q5 Min PRN, TroyAlice stanley MD  •  nitroglycerin (NITROSTAT) SL tablet 0.4 mg, 0.4 mg, Sublingual, Q5 Min PRN, Alice Simmons MD  •  ondansetron (ZOFRAN) injection 4 mg, 4 mg, Intravenous, Q6H PRN, TroyAlice stanley MD, 4 mg at 03/11/19 0639  •  potassium chloride (MICRO-K) CR capsule 80 mEq, 80 mEq, Oral, Daily, yT Lofton MD, 80 mEq at 03/12/19 0940  •  [COMPLETED] Insert peripheral IV, , , Once  "**AND** sodium chloride 0.9 % flush 10 mL, 10 mL, Intravenous, PRN, Bull Branch II, MD  •  sodium chloride 0.9 % flush 3 mL, 3 mL, Intravenous, Q12H, Alice Simmons MD, 3 mL at 03/12/19 0941  •  sodium chloride 0.9 % flush 3-10 mL, 3-10 mL, Intravenous, PRN, Alice Simmons MD  •  sodium chloride 0.9 % infusion, 100 mL/hr, Intravenous, Continuous, So Foley MD, Last Rate: 100 mL/hr at 03/12/19 0316, 100 mL/hr at 03/12/19 0316    No Known Allergies    Preventative Medicine  Colonoscopy: none    Review of Systems   Respiratory: Positive for shortness of breath (improved).    Cardiovascular: Negative for chest pain.       Vitals:    03/11/19 2335 03/12/19 0500 03/12/19 0630 03/12/19 0801   BP: 124/60  147/81    BP Location: Left arm      Patient Position: Lying      Pulse: 52  54    Resp: 16   18   Temp: 97.7 °F (36.5 °C)   97.7 °F (36.5 °C)   TempSrc: Oral   Oral   SpO2: 96%      Weight:  129 kg (284 lb 13.4 oz)     Height:           PHYSICAL EXAM:    /81   Pulse 54   Temp 97.7 °F (36.5 °C) (Oral)   Resp 18   Ht 157.5 cm (62\")   Wt 129 kg (284 lb 13.4 oz)   SpO2 96%   BMI 52.10 kg/m²   Body mass index is 52.1 kg/m².    Constitutional: well developed, well nourished, located on  north, seated on the bed  Eyes: sclerae nonicteric, conjunctivae not injected   ENMT: Hearing intactf, trachea midline, thyroid without masses, neck relatively thin compared to her BMI  CVS: RRR, no murmur, peripheral edema not present  Respiratory: CTA, normal respiratory effort   Gastrointestinal: no hepatosplenomegaly, abdomen obese with a large pendulous pannus with chronic lymphedema and fibrosis, abdominal hernia not detected  Genitourinary: inguinal hernia not detected but unable to be assessed  Musculoskeletal: gait stable, muscle mass normal  Skin: warm and dry, no rashes visible  Neurological: awake and alert, seems to have reasonable capacity for understanding for medical decision " making  Psychiatric: appears to have reasonable judgement, pleasant  Lymphatics: no cervical adenopathy    Radiographic Findings: i ordered a CXR due to SOB and it shows no pumonary congestion    Lab Findings: as above    IMPRESSION:  · Hypercalcemia, initially responding to calcitonin, now rising again  · Hyperparathyroidism, on sensipar with   · meningioma 2.8 cm, with additional 6 mm meningioma.  · Diplopia operate in the context of her cerebral edema, with request that he inform me after seeing the patient if he thinks that we should hold off due to such  · Cerebral edema, on steroids  · Body mass index is 52.1 kg/m².   · SOB when recumbent likely related to abdominal pressure on the diaphragm.    PLAN:  · SPECT nuclear scan to evaluate for parathyroid adenoma.  Described the benefits of approaching from a minimally invasive standpoint.  · [place] time for OR tomorrow.  · Discussed with patient increased perioperative risks associated with obesity including increased risks of DVT, infection, seromas, poor wound healing and hernias (with abdominal surgery).  · Discussed that her diplopia may not improve after this surgery.  · Discussed with Dr Peralta about intent to, thus getting his clearance.  · Contacted Dr. Stephane Maldonado with clearance from a cardiac standpoint  The risk of parathyroid surgery were discussed with the patient including bleeding, infection, recurrent laryngeal nerve injury, hypocalcemia potentially necessitating temporary or permanent supplementation with calcium and/or activated vitamin D, with repeated labs.  We also discussed the accuracy rate of pre op studies not being ideal and the potential that the affected gland may not be located and hypercalcemia may remain.  Of course, scarring will be present.  · We discussed the nature of this procedure but if the gland cannot be found in the expected location, and/or bilateral exploration is need, there is the possibility of the need for  supplementation of a more complex nature will be more likely and for a more extended period.    Michelle Reynoso MD  03/12/19

## 2019-03-12 NOTE — PROGRESS NOTES
"DAILY PROGRESS NOTE  [facility]    Patient Identification:  Name: Joan Almanza  Age: 64 y.o.  Sex: female  :  1955  MRN: [id number]         Primary Care Physician: Juarez Winters MD      Subjective  No acute c/o.     Objective:  General Appearance:  Comfortable, well-appearing, in no acute distress and not in pain (Morbidly obese).    Vital signs: (most recent): Blood pressure 147/81, pulse 54, temperature 97.7 °F (36.5 °C), temperature source Oral, resp. rate 18, height 157.5 cm (62\"), weight 129 kg (284 lb 13.4 oz), SpO2 96 %.    HEENT: (Poor dental hygiene. )    Lungs:  Normal effort and normal respiratory rate.  Breath sounds clear to auscultation.    Heart: Normal rate.  Regular rhythm.    Extremities: There is no dependent edema.    Neurological: Patient is alert and oriented to person, place and time.    Skin:  Warm and dry.                Vital signs in last 24 hours:  Temp:  [97.5 °F (36.4 °C)-97.7 °F (36.5 °C)] 97.7 °F (36.5 °C)  Heart Rate:  [52-81] 54  Resp:  [16-18] 18  BP: (124-147)/(52-81) 147/81    Intake/Output:    Intake/Output Summary (Last 24 hours) at 3/12/2019 1456  Last data filed at 3/12/2019 1111  Gross per 24 hour   Intake 1240 ml   Output 2100 ml   Net -860 ml         Results from last 7 days   Lab Units 19  0538 03/10/19  0539 19  0522 19  0522   WBC 10*3/mm3 16.96* 15.39* 16.11* 13.54*   HEMOGLOBIN g/dL 15.3 14.3 15.2 14.0   PLATELETS 10*3/mm3 232 214 250 226     Results from last 7 days   Lab Units 19  0904 19  0538 03/10/19  0539 19  0522 19  0522 19  1040   SODIUM mmol/L 139 137 140 140 141 139   POTASSIUM mmol/L 4.2 4.3 4.5 4.2 3.4* 4.9   CHLORIDE mmol/L 102 102 107 109* 106 100   CO2 mmol/L 23.5 21.4* 22.5 22.9 23.8 26.8   BUN mg/dL 21 18 19 17 21 20   CREATININE mg/dL 0.86 0.71 0.72 0.61 0.66 0.98   GLUCOSE mg/dL 134* 127* 126* 110* 127* 115*   Estimated Creatinine Clearance: 85.2 mL/min (by C-G formula " based on SCr of 0.86 mg/dL).  Results from last 7 days   Lab Units 03/12/19  0904 03/11/19  0538 03/10/19  0539 03/09/19  0522 03/08/19  0522 03/06/19  1040   CALCIUM mg/dL 12.2* 11.6* 11.5* 12.0* 11.6* 13.3*   ALBUMIN g/dL  --  3.50  --   --   --   --    PHOSPHORUS mg/dL  --  1.8*  --   --   --   --      Results from last 7 days   Lab Units 03/11/19  0538   ALBUMIN g/dL 3.50       Assessment:  Diplopia   Vasogenic edema lt temporal lobe.   Dural AV fistula  Meningioma  Bradycardia  Hyperparathyroidism:    Morbid obesity:       Plan:  No contraindications for proceeding with surgery.  Discussed w General Surgery.   Over 35 min spent w over 1/2 in counseling and medical management.     Edward Peralta MD  3/12/2019  2:56 PM

## 2019-03-12 NOTE — PROGRESS NOTES
[facility] PROGRESS NOTE    PATIENT IDENTIFICATION:   Name:  Joan Almanza      MRN:  [id number]     64 y.o.  female               CC:AVF and cranial fossa mass      Subjective     Interval History: Since last encounter, patient has had new complaints. She is having persistent hypercalcemia. She is anticipating parathyroidectomy. She reports resolution of headaches and eye pain with movement following steroid initiation; however, diplopia has not improved. It is constant.    ROS:  HEENT: No headache; diplopia,   Neuro: No numbness, tingling, or weakness, no speech difficulties, no balance difficulties    Objective     Vital signs in last 24 hours:  Temp:  [97.5 °F (36.4 °C)-97.7 °F (36.5 °C)] 97.7 °F (36.5 °C)  Heart Rate:  [52-81] 54  Resp:  [16-18] 18  BP: (124-147)/(52-81) 147/81      Intake/Output this shift:  I/O this shift:  In: -   Out: 1600 [Urine:1600]      Intake/Output last 3 shifts:  I/O last 3 completed shifts:  In: 2815 [P.O.:865; I.V.:1950]  Out: 5300 [Urine:5300]    LABS:  Results from last 7 days   Lab Units 03/11/19  0538 03/10/19  0539 03/09/19  0522   WBC 10*3/mm3 16.96* 15.39* 16.11*   HEMOGLOBIN g/dL 15.3 14.3 15.2   HEMATOCRIT % 46.2 43.0 45.0   PLATELETS 10*3/mm3 232 214 250         Results from last 7 days   Lab Units 03/12/19  0904 03/11/19  0538 03/10/19  0539   SODIUM mmol/L 139 137 140   POTASSIUM mmol/L 4.2 4.3 4.5   CHLORIDE mmol/L 102 102 107   CO2 mmol/L 23.5 21.4* 22.5   BUN mg/dL 21 18 19   CREATININE mg/dL 0.86 0.71 0.72   CALCIUM mg/dL 12.2* 11.6* 11.5*   GLUCOSE mg/dL 134* 127* 126*       IMAGING STUDIES:  No new imaging    I personally viewed and interpreted the patient's chart.    Meds reviewed/changed: Yes    Current Facility-Administered Medications:   •  acetaminophen (TYLENOL) tablet 650 mg, 650 mg, Oral, Q4H PRN, Alice Simmons MD, 650 mg at 03/11/19 2130  •  amLODIPine (NORVASC) tablet 10 mg, 10 mg, Oral, Q24H, Carol Ann Johnson, APRN, 10 mg at  03/12/19 0940  •  cinacalcet (SENSIPAR) tablet 30 mg, 30 mg, Oral, Daily With Breakfast, So Foley MD, 30 mg at 03/12/19 0941  •  docusate sodium (COLACE) capsule 100 mg, 100 mg, Oral, BID, Pranav Galindo MD, 100 mg at 03/12/19 0940  •  famotidine (PEPCID) tablet 20 mg, 20 mg, Oral, BID, Elisabet Skinner, APRLINDSAY, 20 mg at 03/12/19 0940  •  furosemide (LASIX) injection 20 mg, 20 mg, Intravenous, Daily, So Foley MD, 20 mg at 03/12/19 0941  •  hydrALAZINE (APRESOLINE) injection 10 mg, 10 mg, Intravenous, Q6H PRN, Carol Ann Johnson APRN  •  hydrALAZINE (APRESOLINE) tablet 25 mg, 25 mg, Oral, Q8H, So Foley MD, 25 mg at 03/12/19 0630  •  HYDROcodone-acetaminophen (NORCO) 7.5-325 MG per tablet 1 tablet, 1 tablet, Oral, Q4H PRN, Surjit Ellsworth MD, 1 tablet at 03/05/19 6187  •  HYDROmorphone (DILAUDID) injection 0.5 mg, 0.5 mg, Intravenous, Q2H PRN **AND** naloxone (NARCAN) injection 0.4 mg, 0.4 mg, Intravenous, Q5 Min PRN, Alice Simmons MD  •  methylPREDNISolone (MEDROL (ANEESH)) tablet 4 mg, 4 mg, Oral, After Lunch, Lucero Johnson, APRN  •  methylPREDNISolone (MEDROL (ANEESH)) tablet 4 mg, 4 mg, Oral, After Dinner, Lucero Johnson, APRN  •  [START ON 3/13/2019] methylPREDNISolone (MEDROL (ANEESH)) tablet 4 mg, 4 mg, Oral, TID Around Food, Lucero Johnson, APRN  •  [START ON 3/14/2019] methylPREDNISolone (MEDROL (ANEESH)) tablet 4 mg, 4 mg, Oral, 4x Daily Taper, Lucero Johnson, APRN  •  [START ON 3/15/2019] methylPREDNISolone (MEDROL (ANEESH)) tablet 4 mg, 4 mg, Oral, TID Around Food, Bird, Lucero E., APRN  •  [START ON 3/16/2019] methylPREDNISolone (MEDROL (ANEESH)) tablet 4 mg, 4 mg, Oral, Before Breakfast, Bird, Lucero E., APRN  •  [START ON 3/16/2019] methylPREDNISolone (MEDROL (ANEESH)) tablet 4 mg, 4 mg, Oral, Tonight, Bird, Lucero E., APRN  •  [START ON 3/17/2019] methylPREDNISolone (MEDROL (ANEESH)) tablet 4 mg, 4 mg, Oral, Before Breakfast, Bird, Lucero E., APRN  •  [START ON 3/13/2019]  methylPREDNISolone (MEDROL (ANEESH)) tablet 8 mg, 8 mg, Oral, Before Breakfast, Lucero Johnson, APRN  •  methylPREDNISolone (MEDROL (ANEESH)) tablet 8 mg, 8 mg, Oral, Tonight, Lucero Johnson., APRN  •  [START ON 3/13/2019] methylPREDNISolone (MEDROL (ANEESH)) tablet 8 mg, 8 mg, Oral, Tonight, Lucero Johnson E., APRN  •  nitroglycerin (NITROSTAT) SL tablet 0.4 mg, 0.4 mg, Sublingual, Q5 Min PRN, Alice Simmons MD  •  ondansetron (ZOFRAN) injection 4 mg, 4 mg, Intravenous, Q6H PRN, Alice Simmons MD, 4 mg at 03/11/19 0639  •  potassium chloride (MICRO-K) CR capsule 80 mEq, 80 mEq, Oral, Daily, Ty Lofton MD, 80 mEq at 03/12/19 0940  •  [COMPLETED] Insert peripheral IV, , , Once **AND** sodium chloride 0.9 % flush 10 mL, 10 mL, Intravenous, PRN, Bull Branch II, MD  •  sodium chloride 0.9 % flush 3 mL, 3 mL, Intravenous, Q12H, Alice Simmons MD, 3 mL at 03/12/19 0941  •  sodium chloride 0.9 % flush 3-10 mL, 3-10 mL, Intravenous, PRN, Alice Simmons MD  •  sodium chloride 0.9 % infusion, 100 mL/hr, Intravenous, Continuous, So Foley MD, Last Rate: 100 mL/hr at 03/12/19 0316, 100 mL/hr at 03/12/19 0316      Physical Exam:    General:   Awake, alert, oriented x3. Speech clear with no aphasia  CN II:    VFFTC    CN III IV VI: Slight reduction in LR movement bilaterally; has constant diplopia, PERRL   CN V: Normal facial sensation and strength of muscles of mastication.  CN VII: Facial movements are symmetric. No weakness.      Motor: Slight drift right UE    Station and Gait: stable station. Wide based gait (large body habitus)   Coordination: Finger to nose shows no dysmetria. Rhomberg negative.      Assessment/Plan     ASSESSMENT:      Diplopia    Cerebral edema (CMS/HCC)    Meningioma (CMS/HCC)    AVF (arteriovenous fistula) (CMS/HCC)    6th nerve palsy, left    Hypercalcemia    Hyperparathyroidism (CMS/HCC)      PLAN: Patient continues inpatient due to hypercalcemia work up. She has PTH elevation and PT  imaging study pending. She is likely to have parathyroidectomy this week. We have been asked to follow up for ALMA clearance to proceed with this surgery as well as consideration of either/or AVF/meningioma treatment during this hospitalization. When evaluated last week, she wanted to defer surgery until her Medicare coverage began in early June.  However, she is apparently Medicaid pending and will be eligible for Medicaid spend down upon hospital discharge.  She is interested in reconsidering treatment of her neurosurgical conditions if it would be best to proceed at this point.  She does have improvement of her headache and eye pain, but diplopia persist.  No sudden or severe headaches.  Exam is as noted above with no significant change from her consults last week.  I will discuss with Dr. Bhakta clearance for her parathyroid surgery as well as timing of surgical management of AVF/meningioma.  We did discontinue her IV steroid as we did not anticipate her being in the hospital this long.  I went ahead and ordered a Medrol Dosepak for weaning.    I discussed with Dr. Bhakta. Patient is clear from neurosurgical standpoint to proceed with parathyroidectomy.    I discussed the patients findings and my recommendations with patient, nursing staff and LANIE Covington and Dr. Bhakta       LOS: 8 days       Elisabet Skinner, APRN  3/12/2019  2:54 PM    Dragon disclaimer:   Much of this encounter note is an electronic transcription/translation of spoken language to printed text. The electronic translation of spoken language may permit erroneous, or at times, nonsensical words or phrases to be inadvertently transcribed; Although I have reviewed the note for such errors, some may still exist.

## 2019-03-13 ENCOUNTER — ANESTHESIA EVENT (OUTPATIENT)
Dept: PERIOP | Facility: HOSPITAL | Age: 64
End: 2019-03-13

## 2019-03-13 ENCOUNTER — ANESTHESIA (OUTPATIENT)
Dept: PERIOP | Facility: HOSPITAL | Age: 64
End: 2019-03-13

## 2019-03-13 LAB
CALCIUM SPEC-SCNC: 11.2 MG/DL (ref 8.6–10.5)
CALCIUM SPEC-SCNC: 11.3 MG/DL (ref 8.6–10.5)
CALCIUM SPEC-SCNC: 11.9 MG/DL (ref 8.6–10.5)
PTH-INTACT SERPL-MCNC: 309 PG/ML (ref 15–65)
PTH-INTACT SERPL-SCNC: 339.7 PG/ML (ref 15–65)
PTH-INTACT SERPL-SCNC: 55 PG/ML (ref 15–65)

## 2019-03-13 PROCEDURE — 0GBH0ZZ EXCISION OF RIGHT THYROID GLAND LOBE, OPEN APPROACH: ICD-10-PCS | Performed by: SURGERY

## 2019-03-13 PROCEDURE — 25010000002 FUROSEMIDE PER 20 MG: Performed by: INTERNAL MEDICINE

## 2019-03-13 PROCEDURE — 25010000002 FENTANYL CITRATE (PF) 100 MCG/2ML SOLUTION: Performed by: NURSE ANESTHETIST, CERTIFIED REGISTERED

## 2019-03-13 PROCEDURE — 60500 EXPLORE PARATHYROID GLANDS: CPT | Performed by: SURGERY

## 2019-03-13 PROCEDURE — 25010000002 VANCOMYCIN 1 G RECONSTITUTED SOLUTION 1 EACH VIAL: Performed by: SURGERY

## 2019-03-13 PROCEDURE — 25010000002 PHENYLEPHRINE PER 1 ML: Performed by: NURSE ANESTHETIST, CERTIFIED REGISTERED

## 2019-03-13 PROCEDURE — 25010000003 CEFAZOLIN IN DEXTROSE 2-4 GM/100ML-% SOLUTION: Performed by: SURGERY

## 2019-03-13 PROCEDURE — 0GTP0ZZ RESECTION OF LEFT INFERIOR PARATHYROID GLAND, OPEN APPROACH: ICD-10-PCS | Performed by: SURGERY

## 2019-03-13 PROCEDURE — 88307 TISSUE EXAM BY PATHOLOGIST: CPT | Performed by: SURGERY

## 2019-03-13 PROCEDURE — 60200 REMOVE THYROID LESION: CPT | Performed by: SURGERY

## 2019-03-13 PROCEDURE — 0GBN0ZX EXCISION OF RIGHT INFERIOR PARATHYROID GLAND, OPEN APPROACH, DIAGNOSTIC: ICD-10-PCS | Performed by: SURGERY

## 2019-03-13 PROCEDURE — 60200 REMOVE THYROID LESION: CPT | Performed by: PHYSICIAN ASSISTANT

## 2019-03-13 PROCEDURE — 63710000001 METHYLPREDNISOLONE 4 MG TABLET THERAPY PACK 21 EACH DISP PACK: Performed by: NURSE PRACTITIONER

## 2019-03-13 PROCEDURE — 83970 ASSAY OF PARATHORMONE: CPT | Performed by: SURGERY

## 2019-03-13 PROCEDURE — 25010000002 PROPOFOL 10 MG/ML EMULSION: Performed by: NURSE ANESTHETIST, CERTIFIED REGISTERED

## 2019-03-13 PROCEDURE — 25010000002 ONDANSETRON PER 1 MG: Performed by: NURSE ANESTHETIST, CERTIFIED REGISTERED

## 2019-03-13 PROCEDURE — 25010000002 NEOSTIGMINE PER 0.5 MG: Performed by: NURSE ANESTHETIST, CERTIFIED REGISTERED

## 2019-03-13 PROCEDURE — 0GTL0ZZ RESECTION OF RIGHT SUPERIOR PARATHYROID GLAND, OPEN APPROACH: ICD-10-PCS | Performed by: SURGERY

## 2019-03-13 PROCEDURE — 0GBM0ZX EXCISION OF LEFT SUPERIOR PARATHYROID GLAND, OPEN APPROACH, DIAGNOSTIC: ICD-10-PCS | Performed by: SURGERY

## 2019-03-13 PROCEDURE — 88331 PATH CONSLTJ SURG 1 BLK 1SPC: CPT | Performed by: PATHOLOGY

## 2019-03-13 PROCEDURE — 60500 EXPLORE PARATHYROID GLANDS: CPT | Performed by: PHYSICIAN ASSISTANT

## 2019-03-13 PROCEDURE — 88305 TISSUE EXAM BY PATHOLOGIST: CPT | Performed by: SURGERY

## 2019-03-13 PROCEDURE — 82310 ASSAY OF CALCIUM: CPT | Performed by: SURGERY

## 2019-03-13 DEVICE — CLIP LIGAT VASC HORIZON TI SM/WD RED 24CT: Type: IMPLANTABLE DEVICE | Site: NECK | Status: FUNCTIONAL

## 2019-03-13 RX ORDER — NALOXONE HCL 0.4 MG/ML
0.1 VIAL (ML) INJECTION
Status: DISCONTINUED | OUTPATIENT
Start: 2019-03-13 | End: 2019-03-20 | Stop reason: HOSPADM

## 2019-03-13 RX ORDER — NALOXONE HCL 0.4 MG/ML
0.4 VIAL (ML) INJECTION
Status: DISCONTINUED | OUTPATIENT
Start: 2019-03-13 | End: 2019-03-20 | Stop reason: HOSPADM

## 2019-03-13 RX ORDER — CEFAZOLIN SODIUM 2 G/100ML
2 INJECTION, SOLUTION INTRAVENOUS ONCE
Status: COMPLETED | OUTPATIENT
Start: 2019-03-13 | End: 2019-03-13

## 2019-03-13 RX ORDER — ACETAMINOPHEN 160 MG/5ML
650 SOLUTION ORAL EVERY 4 HOURS PRN
Status: DISCONTINUED | OUTPATIENT
Start: 2019-03-13 | End: 2019-03-20 | Stop reason: HOSPADM

## 2019-03-13 RX ORDER — HYDRALAZINE HYDROCHLORIDE 20 MG/ML
5 INJECTION INTRAMUSCULAR; INTRAVENOUS
Status: DISCONTINUED | OUTPATIENT
Start: 2019-03-13 | End: 2019-03-13 | Stop reason: HOSPADM

## 2019-03-13 RX ORDER — FENTANYL CITRATE 50 UG/ML
50 INJECTION, SOLUTION INTRAMUSCULAR; INTRAVENOUS
Status: DISCONTINUED | OUTPATIENT
Start: 2019-03-13 | End: 2019-03-13 | Stop reason: HOSPADM

## 2019-03-13 RX ORDER — LABETALOL HYDROCHLORIDE 5 MG/ML
5 INJECTION, SOLUTION INTRAVENOUS
Status: DISCONTINUED | OUTPATIENT
Start: 2019-03-13 | End: 2019-03-13 | Stop reason: HOSPADM

## 2019-03-13 RX ORDER — ONDANSETRON 2 MG/ML
INJECTION INTRAMUSCULAR; INTRAVENOUS AS NEEDED
Status: DISCONTINUED | OUTPATIENT
Start: 2019-03-13 | End: 2019-03-13 | Stop reason: SURG

## 2019-03-13 RX ORDER — ACETAMINOPHEN 325 MG/1
650 TABLET ORAL ONCE AS NEEDED
Status: DISCONTINUED | OUTPATIENT
Start: 2019-03-13 | End: 2019-03-13 | Stop reason: HOSPADM

## 2019-03-13 RX ORDER — EPHEDRINE SULFATE 50 MG/ML
5 INJECTION, SOLUTION INTRAVENOUS ONCE AS NEEDED
Status: DISCONTINUED | OUTPATIENT
Start: 2019-03-13 | End: 2019-03-13 | Stop reason: HOSPADM

## 2019-03-13 RX ORDER — HYDROMORPHONE HYDROCHLORIDE 1 MG/ML
0.5 INJECTION, SOLUTION INTRAMUSCULAR; INTRAVENOUS; SUBCUTANEOUS
Status: DISCONTINUED | OUTPATIENT
Start: 2019-03-13 | End: 2019-03-20 | Stop reason: HOSPADM

## 2019-03-13 RX ORDER — ACETAMINOPHEN 325 MG/1
650 TABLET ORAL EVERY 4 HOURS PRN
Status: DISCONTINUED | OUTPATIENT
Start: 2019-03-13 | End: 2019-03-20 | Stop reason: HOSPADM

## 2019-03-13 RX ORDER — BUPIVACAINE HYDROCHLORIDE AND EPINEPHRINE 5; 5 MG/ML; UG/ML
INJECTION, SOLUTION PERINEURAL AS NEEDED
Status: DISCONTINUED | OUTPATIENT
Start: 2019-03-13 | End: 2019-03-13 | Stop reason: HOSPADM

## 2019-03-13 RX ORDER — MORPHINE SULFATE 2 MG/ML
4 INJECTION, SOLUTION INTRAMUSCULAR; INTRAVENOUS
Status: DISCONTINUED | OUTPATIENT
Start: 2019-03-13 | End: 2019-03-20 | Stop reason: HOSPADM

## 2019-03-13 RX ORDER — LIDOCAINE HYDROCHLORIDE 10 MG/ML
0.5 INJECTION, SOLUTION EPIDURAL; INFILTRATION; INTRACAUDAL; PERINEURAL ONCE AS NEEDED
Status: DISCONTINUED | OUTPATIENT
Start: 2019-03-13 | End: 2019-03-13 | Stop reason: HOSPADM

## 2019-03-13 RX ORDER — PROPOFOL 10 MG/ML
VIAL (ML) INTRAVENOUS AS NEEDED
Status: DISCONTINUED | OUTPATIENT
Start: 2019-03-13 | End: 2019-03-13 | Stop reason: SURG

## 2019-03-13 RX ORDER — FENTANYL CITRATE 50 UG/ML
INJECTION, SOLUTION INTRAMUSCULAR; INTRAVENOUS AS NEEDED
Status: DISCONTINUED | OUTPATIENT
Start: 2019-03-13 | End: 2019-03-13 | Stop reason: SURG

## 2019-03-13 RX ORDER — NALOXONE HCL 0.4 MG/ML
0.2 VIAL (ML) INJECTION AS NEEDED
Status: DISCONTINUED | OUTPATIENT
Start: 2019-03-13 | End: 2019-03-13 | Stop reason: HOSPADM

## 2019-03-13 RX ORDER — ONDANSETRON 2 MG/ML
4 INJECTION INTRAMUSCULAR; INTRAVENOUS ONCE AS NEEDED
Status: DISCONTINUED | OUTPATIENT
Start: 2019-03-13 | End: 2019-03-13 | Stop reason: HOSPADM

## 2019-03-13 RX ORDER — SODIUM CHLORIDE 0.9 % (FLUSH) 0.9 %
1-10 SYRINGE (ML) INJECTION AS NEEDED
Status: DISCONTINUED | OUTPATIENT
Start: 2019-03-13 | End: 2019-03-13 | Stop reason: HOSPADM

## 2019-03-13 RX ORDER — HYDROCODONE BITARTRATE AND ACETAMINOPHEN 10; 325 MG/1; MG/1
1 TABLET ORAL EVERY 4 HOURS PRN
Status: DISCONTINUED | OUTPATIENT
Start: 2019-03-13 | End: 2019-03-20 | Stop reason: HOSPADM

## 2019-03-13 RX ORDER — CALCITRIOL 0.25 UG/1
0.5 CAPSULE, LIQUID FILLED ORAL DAILY
Status: DISCONTINUED | OUTPATIENT
Start: 2019-03-13 | End: 2019-03-13

## 2019-03-13 RX ORDER — FLUMAZENIL 0.1 MG/ML
0.2 INJECTION INTRAVENOUS AS NEEDED
Status: DISCONTINUED | OUTPATIENT
Start: 2019-03-13 | End: 2019-03-13 | Stop reason: HOSPADM

## 2019-03-13 RX ORDER — FAMOTIDINE 10 MG/ML
20 INJECTION, SOLUTION INTRAVENOUS ONCE
Status: COMPLETED | OUTPATIENT
Start: 2019-03-13 | End: 2019-03-13

## 2019-03-13 RX ORDER — GLYCOPYRROLATE 0.2 MG/ML
INJECTION INTRAMUSCULAR; INTRAVENOUS AS NEEDED
Status: DISCONTINUED | OUTPATIENT
Start: 2019-03-13 | End: 2019-03-13 | Stop reason: SURG

## 2019-03-13 RX ORDER — HYDROCODONE BITARTRATE AND ACETAMINOPHEN 5; 325 MG/1; MG/1
1 TABLET ORAL EVERY 4 HOURS PRN
Status: DISCONTINUED | OUTPATIENT
Start: 2019-03-13 | End: 2019-03-20 | Stop reason: HOSPADM

## 2019-03-13 RX ORDER — SODIUM CHLORIDE 450 MG/100ML
45 INJECTION, SOLUTION INTRAVENOUS CONTINUOUS
Status: DISCONTINUED | OUTPATIENT
Start: 2019-03-13 | End: 2019-03-14

## 2019-03-13 RX ORDER — HYDROMORPHONE HYDROCHLORIDE 1 MG/ML
0.5 INJECTION, SOLUTION INTRAMUSCULAR; INTRAVENOUS; SUBCUTANEOUS
Status: DISCONTINUED | OUTPATIENT
Start: 2019-03-13 | End: 2019-03-13 | Stop reason: HOSPADM

## 2019-03-13 RX ORDER — DIPHENHYDRAMINE HCL 25 MG
25 CAPSULE ORAL
Status: DISCONTINUED | OUTPATIENT
Start: 2019-03-13 | End: 2019-03-13 | Stop reason: HOSPADM

## 2019-03-13 RX ORDER — LIDOCAINE HYDROCHLORIDE 20 MG/ML
INJECTION, SOLUTION INFILTRATION; PERINEURAL AS NEEDED
Status: DISCONTINUED | OUTPATIENT
Start: 2019-03-13 | End: 2019-03-13 | Stop reason: SURG

## 2019-03-13 RX ORDER — ACETAMINOPHEN 650 MG/1
650 SUPPOSITORY RECTAL EVERY 4 HOURS PRN
Status: DISCONTINUED | OUTPATIENT
Start: 2019-03-13 | End: 2019-03-20 | Stop reason: HOSPADM

## 2019-03-13 RX ORDER — SODIUM CHLORIDE, SODIUM LACTATE, POTASSIUM CHLORIDE, CALCIUM CHLORIDE 600; 310; 30; 20 MG/100ML; MG/100ML; MG/100ML; MG/100ML
9 INJECTION, SOLUTION INTRAVENOUS CONTINUOUS
Status: DISCONTINUED | OUTPATIENT
Start: 2019-03-13 | End: 2019-03-13

## 2019-03-13 RX ORDER — ROCURONIUM BROMIDE 10 MG/ML
INJECTION, SOLUTION INTRAVENOUS AS NEEDED
Status: DISCONTINUED | OUTPATIENT
Start: 2019-03-13 | End: 2019-03-13 | Stop reason: SURG

## 2019-03-13 RX ORDER — ACETAMINOPHEN 325 MG/1
650 TABLET ORAL ONCE
Status: COMPLETED | OUTPATIENT
Start: 2019-03-13 | End: 2019-03-13

## 2019-03-13 RX ORDER — MAGNESIUM HYDROXIDE 1200 MG/15ML
LIQUID ORAL AS NEEDED
Status: DISCONTINUED | OUTPATIENT
Start: 2019-03-13 | End: 2019-03-13 | Stop reason: HOSPADM

## 2019-03-13 RX ADMIN — PHENYLEPHRINE HYDROCHLORIDE 200 MCG: 10 INJECTION INTRAVENOUS at 10:55

## 2019-03-13 RX ADMIN — SODIUM CHLORIDE 500 MG: 900 INJECTION, SOLUTION INTRAVENOUS at 09:59

## 2019-03-13 RX ADMIN — SODIUM CHLORIDE, POTASSIUM CHLORIDE, SODIUM LACTATE AND CALCIUM CHLORIDE 9 ML/HR: 600; 310; 30; 20 INJECTION, SOLUTION INTRAVENOUS at 14:56

## 2019-03-13 RX ADMIN — LIDOCAINE HYDROCHLORIDE 100 MG: 20 INJECTION, SOLUTION INFILTRATION; PERINEURAL at 10:54

## 2019-03-13 RX ADMIN — FENTANYL CITRATE 50 MCG: 50 INJECTION, SOLUTION INTRAMUSCULAR; INTRAVENOUS at 14:03

## 2019-03-13 RX ADMIN — SODIUM CHLORIDE, POTASSIUM CHLORIDE, SODIUM LACTATE AND CALCIUM CHLORIDE 9 ML/HR: 600; 310; 30; 20 INJECTION, SOLUTION INTRAVENOUS at 09:49

## 2019-03-13 RX ADMIN — CALCIUM CARBONATE-VITAMIN D TAB 500 MG-200 UNIT 1000 MG: 500-200 TAB at 21:57

## 2019-03-13 RX ADMIN — CALCIUM CARBONATE-VITAMIN D TAB 500 MG-200 UNIT 1000 MG: 500-200 TAB at 17:29

## 2019-03-13 RX ADMIN — CEFAZOLIN SODIUM 2 G: 2 INJECTION, SOLUTION INTRAVENOUS at 11:04

## 2019-03-13 RX ADMIN — CALCITRIOL CAPSULES 0.25 MCG 0.5 MCG: 0.25 CAPSULE ORAL at 17:29

## 2019-03-13 RX ADMIN — HYDROCODONE BITARTRATE AND ACETAMINOPHEN 1 TABLET: 10; 325 TABLET ORAL at 17:30

## 2019-03-13 RX ADMIN — ONDANSETRON 4 MG: 2 INJECTION INTRAMUSCULAR; INTRAVENOUS at 12:40

## 2019-03-13 RX ADMIN — FUROSEMIDE 20 MG: 10 INJECTION, SOLUTION INTRAMUSCULAR; INTRAVENOUS at 16:22

## 2019-03-13 RX ADMIN — POTASSIUM CHLORIDE 80 MEQ: 750 CAPSULE, EXTENDED RELEASE ORAL at 16:23

## 2019-03-13 RX ADMIN — FENTANYL CITRATE 50 MCG: 50 INJECTION INTRAMUSCULAR; INTRAVENOUS at 10:54

## 2019-03-13 RX ADMIN — FENTANYL CITRATE 25 MCG: 50 INJECTION INTRAMUSCULAR; INTRAVENOUS at 11:32

## 2019-03-13 RX ADMIN — SODIUM CHLORIDE, PRESERVATIVE FREE 3 ML: 5 INJECTION INTRAVENOUS at 21:56

## 2019-03-13 RX ADMIN — ACETAMINOPHEN 650 MG: 325 TABLET, FILM COATED ORAL at 09:49

## 2019-03-13 RX ADMIN — SODIUM CHLORIDE 45 ML/HR: 4.5 INJECTION, SOLUTION INTRAVENOUS at 15:36

## 2019-03-13 RX ADMIN — HYDRALAZINE HYDROCHLORIDE 25 MG: 25 TABLET ORAL at 06:38

## 2019-03-13 RX ADMIN — FAMOTIDINE 20 MG: 10 INJECTION INTRAVENOUS at 09:49

## 2019-03-13 RX ADMIN — SODIUM CHLORIDE 100 ML/HR: 9 INJECTION, SOLUTION INTRAVENOUS at 04:02

## 2019-03-13 RX ADMIN — ROCURONIUM BROMIDE 40 MG: 10 INJECTION INTRAVENOUS at 10:54

## 2019-03-13 RX ADMIN — METHYLPREDNISOLONE 8 MG: 4 TABLET ORAL at 21:57

## 2019-03-13 RX ADMIN — HYDRALAZINE HYDROCHLORIDE 25 MG: 25 TABLET ORAL at 21:56

## 2019-03-13 RX ADMIN — PHENYLEPHRINE HYDROCHLORIDE 0.5 MCG/KG/MIN: 10 INJECTION, SOLUTION INTRAMUSCULAR; INTRAVENOUS; SUBCUTANEOUS at 11:11

## 2019-03-13 RX ADMIN — GLYCOPYRROLATE 0.4 MG: 0.2 INJECTION INTRAMUSCULAR; INTRAVENOUS at 12:53

## 2019-03-13 RX ADMIN — PROPOFOL 100 MG: 10 INJECTION, EMULSION INTRAVENOUS at 10:54

## 2019-03-13 RX ADMIN — HYDROCODONE BITARTRATE AND ACETAMINOPHEN 1 TABLET: 7.5; 325 TABLET ORAL at 22:03

## 2019-03-13 RX ADMIN — METHYLPREDNISOLONE 4 MG: 4 TABLET ORAL at 06:38

## 2019-03-13 RX ADMIN — AMLODIPINE BESYLATE 10 MG: 10 TABLET ORAL at 16:22

## 2019-03-13 RX ADMIN — FENTANYL CITRATE 25 MCG: 50 INJECTION INTRAMUSCULAR; INTRAVENOUS at 12:58

## 2019-03-13 RX ADMIN — HYDRALAZINE HYDROCHLORIDE 25 MG: 25 TABLET ORAL at 16:22

## 2019-03-13 RX ADMIN — NEOSTIGMINE METHYLSULFATE 2.5 MG: 1 INJECTION INTRAMUSCULAR; INTRAVENOUS; SUBCUTANEOUS at 12:53

## 2019-03-13 RX ADMIN — METHYLPREDNISOLONE 4 MG: 4 TABLET ORAL at 17:31

## 2019-03-13 RX ADMIN — SODIUM CHLORIDE, PRESERVATIVE FREE 3 ML: 5 INJECTION INTRAVENOUS at 08:10

## 2019-03-13 NOTE — PROGRESS NOTES
"   LOS: 9 days    Patient Care Team:  Juarez Winters MD as PCP - General (Family Medicine)    Chief Complaint:    Chief Complaint   Patient presents with   • Headache   • Eye Pain   • Diplopia     Follow UP Hypercalcemia  Subjective     Interval History:     S:  Mel nd examined. No SOA or CP. Going for surgery today    Objective     Vital Signs  Temp:  [97.5 °F (36.4 °C)-97.8 °F (36.6 °C)] 97.8 °F (36.6 °C)  Heart Rate:  [42-69] 42  Resp:  [14-18] 14  BP: (138-164)/(69-71) 146/70    Flowsheet Rows      First Filed Value   Admission Height  157.5 cm (62\") Documented at 03/04/2019 1050   Admission Weight  122 kg (270 lb) Documented at 03/04/2019 1050          I/O this shift:  In: 1718 [P.O.:480; I.V.:1238]  Out: 1700 [Urine:1700]  I/O last 3 completed shifts:  In: 1240 [P.O.:240; I.V.:1000]  Out: 3700 [Urine:3700]    Intake/Output Summary (Last 24 hours) at 3/13/2019 0648  Last data filed at 3/13/2019 0637  Gross per 24 hour   Intake 1718 ml   Output 3300 ml   Net -1582 ml       Physical Exam:  Exam:  GEN:               No distress, appears stated age  EYES:              Anicteric sclera  ENT:                External ears/nose normal, MM are moist  NECK:             No adenopathy, JVP none  LUNGS:           Normal chest on inspection; not labored  CV:                  Normal S1S2, without murmur  ABD:                Non-tender, non-distended, no hepatosplenomegaly, +BS  EXT:                No edema; no cyanosis; clubbing       Results Review:    Results from last 7 days   Lab Units 03/13/19  0417 03/12/19  0904 03/11/19  0538 03/10/19  0539   SODIUM mmol/L  --  139 137 140   POTASSIUM mmol/L  --  4.2 4.3 4.5   CHLORIDE mmol/L  --  102 102 107   CO2 mmol/L  --  23.5 21.4* 22.5   BUN mg/dL  --  21 18 19   CREATININE mg/dL  --  0.86 0.71 0.72   CALCIUM mg/dL 11.9* 12.2* 11.6* 11.5*   GLUCOSE mg/dL  --  134* 127* 126*       Estimated Creatinine Clearance: 83.6 mL/min (by C-G formula based on SCr of 0.86 " mg/dL).    Results from last 7 days   Lab Units 03/11/19  0538   PHOSPHORUS mg/dL 1.8*             Results from last 7 days   Lab Units 03/11/19  0538 03/10/19  0539 03/09/19  0522 03/08/19  0522   WBC 10*3/mm3 16.96* 15.39* 16.11* 13.54*   HEMOGLOBIN g/dL 15.3 14.3 15.2 14.0   PLATELETS 10*3/mm3 232 214 250 226               Imaging Results (last 24 hours)     Procedure Component Value Units Date/Time    NM Parathyroid Scan w SPECT [[id number]] Collected:  03/12/19 1633     Updated:  03/12/19 1652    Narrative:       NUCLEAR MEDICINE PARATHYROID SCAN WITH SPECT IMAGING     HISTORY: Hyperparathyroidism.     The examination was performed with 27 mCi of technetium sestamibi with  imaging at 15 minutes and at 3 hours combined with SPECT imaging in the  coronal and sagittal and axial planes. There is normal uptake in both  thyroid lobes at 15 minutes. The right lobe is slightly larger than the  left as also noted on the recent MR angiography of the neck performed on  3/4/2019. There is normal washout from both thyroid lobes at 3 hours.  There is no evidence of parathyroid adenoma.     CONCLUSION: No evidence of parathyroid adenoma. See above discussion.     This report was finalized on 3/12/2019 4:49 PM by Dr. Orlando Walker M.D.       XR Chest PA & Lateral [[id number]] Collected:  03/12/19 1252     Updated:  03/12/19 1257    Narrative:       Chest radiograph     HISTORY: Shortness of breath     TECHNIQUE: Two PA and lateral radiographs     COMPARISON: None     FINDINGS:  The lungs are clear. There is no pleural effusion. No pneumothorax is  seen. The heart is normal in size.       Impression:       No findings of acute cardiopulmonary pathology.     This report was finalized on 3/12/2019 12:53 PM by Dr. Darron Cabrera M.D.             amLODIPine 10 mg Oral Q24H   cinacalcet 30 mg Oral Daily With Breakfast   docusate sodium 100 mg Oral BID   famotidine 20 mg Oral BID   furosemide 20 mg Intravenous Daily   hydrALAZINE 25  mg Oral Q8H   methylPREDNISolone 4 mg Oral TID Around Food   [START ON 3/14/2019] methylPREDNISolone 4 mg Oral 4x Daily Taper   [START ON 3/15/2019] methylPREDNISolone 4 mg Oral TID Around Food   [START ON 3/16/2019] methylPREDNISolone 4 mg Oral Before Breakfast   [START ON 3/16/2019] methylPREDNISolone 4 mg Oral Tonight   [START ON 3/17/2019] methylPREDNISolone 4 mg Oral Before Breakfast   methylPREDNISolone 8 mg Oral Tonight   potassium chloride 80 mEq Oral Daily   sodium chloride 3 mL Intravenous Q12H       sodium chloride 100 mL/hr Last Rate: 100 mL/hr (03/13/19 0402)       Medication Review:   Current Facility-Administered Medications   Medication Dose Route Frequency Provider Last Rate Last Dose   • acetaminophen (TYLENOL) tablet 650 mg  650 mg Oral Q4H PRN Alice Simmons MD   650 mg at 03/12/19 1743   • amLODIPine (NORVASC) tablet 10 mg  10 mg Oral Q24H Carol Ann Johnson APRN   10 mg at 03/12/19 0940   • cinacalcet (SENSIPAR) tablet 30 mg  30 mg Oral Daily With Breakfast So Foley MD   30 mg at 03/12/19 0941   • docusate sodium (COLACE) capsule 100 mg  100 mg Oral BID Pranav Galindo MD   100 mg at 03/12/19 0940   • famotidine (PEPCID) tablet 20 mg  20 mg Oral BID Elisabet Skinner APRN   20 mg at 03/12/19 2131   • furosemide (LASIX) injection 20 mg  20 mg Intravenous Daily So Foley MD   20 mg at 03/12/19 0941   • hydrALAZINE (APRESOLINE) injection 10 mg  10 mg Intravenous Q6H PRN Carol Ann Johnson APRN       • hydrALAZINE (APRESOLINE) tablet 25 mg  25 mg Oral Q8H So Foley MD   25 mg at 03/13/19 0638   • HYDROcodone-acetaminophen (NORCO) 7.5-325 MG per tablet 1 tablet  1 tablet Oral Q4H PRN Surjit Ellsworth MD   1 tablet at 03/05/19 2254   • HYDROmorphone (DILAUDID) injection 0.5 mg  0.5 mg Intravenous Q2H PRN Alice Simmons MD        And   • naloxone (NARCAN) injection 0.4 mg  0.4 mg Intravenous Q5 Min PRN Alice Simmons MD       • LORazepam (ATIVAN) tablet 0.25  mg  0.25 mg Oral Nightly PRN Edward Peralta MD   0.25 mg at 03/12/19 2314   • methylPREDNISolone (MEDROL (ANEESH)) tablet 4 mg  4 mg Oral TID Around Food Bird, Lucero E., APRN   4 mg at 03/13/19 0638   • [START ON 3/14/2019] methylPREDNISolone (MEDROL (ANEESH)) tablet 4 mg  4 mg Oral 4x Daily Taper Bird, Lucero E., APRN       • [START ON 3/15/2019] methylPREDNISolone (MEDROL (ANEESH)) tablet 4 mg  4 mg Oral TID Around Food Bird, Lucero E., APRN       • [START ON 3/16/2019] methylPREDNISolone (MEDROL (ANEESH)) tablet 4 mg  4 mg Oral Before Breakfast Bird, Lucero E., APRN       • [START ON 3/16/2019] methylPREDNISolone (MEDROL (ANEESH)) tablet 4 mg  4 mg Oral Tonight Bird, Lucero E., APRN       • [START ON 3/17/2019] methylPREDNISolone (MEDROL (ANEESH)) tablet 4 mg  4 mg Oral Before Breakfast Bird, Lucero E., APRN       • methylPREDNISolone (MEDROL (ANEESH)) tablet 8 mg  8 mg Oral Tonight Bird, Lucero E., APRN       • nitroglycerin (NITROSTAT) SL tablet 0.4 mg  0.4 mg Sublingual Q5 Min PRN Alice Simmons MD       • ondansetron (ZOFRAN) injection 4 mg  4 mg Intravenous Q6H PRN Alice Simmons MD   4 mg at 03/11/19 0639   • potassium chloride (MICRO-K) CR capsule 80 mEq  80 mEq Oral Daily Ty Lofton MD   80 mEq at 03/12/19 0940   • sodium chloride 0.9 % flush 10 mL  10 mL Intravenous PRN Bull Branch II, MD       • sodium chloride 0.9 % flush 3 mL  3 mL Intravenous Q12H Alice Simmons MD   3 mL at 03/12/19 2131   • sodium chloride 0.9 % flush 3-10 mL  3-10 mL Intravenous PRN Alice Simmons MD       • sodium chloride 0.9 % infusion  100 mL/hr Intravenous Continuous So Foley  mL/hr at 03/13/19 0402 100 mL/hr at 03/13/19 0402       Assessment/Plan         Diplopia    Cerebral edema (CMS/HCC)    Meningioma (CMS/HCC)    AVF (arteriovenous fistula) (CMS/HCC)    6th nerve palsy, left    Hypercalcemia    Hyperparathyroidism (CMS/HCC)    Morbid obesity (CMS/HCC)    1. Hypercalcemia:  thiazide, hyperparathyroidism all  contributing etiologies     2. Hypertension- Hold Thiazide.  Uncontrolled.  Bradycardia noted  3. Intracranial meningioma with cerebral edema and 6th nerve palsy  4.  Severe obesity:  BMI 52     Plan:     Parathyroidectomy today  Will monitor calcium closely after surgery  Will need calcium and vit D supplement  Surveillance       Ty Lofton MD  03/13/19  6:48 AM

## 2019-03-13 NOTE — ANESTHESIA PROCEDURE NOTES
Arterial Line      Patient reassessed immediately prior to procedure    Patient location during procedure: holding area  Start time: 3/13/2019 9:55 AM  Stop Time:3/13/2019 10:03 AM       Line placed for hemodynamic monitoring and ABGs/Labs/ISTAT.  Performed By   Anesthesiologist: Lisa Breen MD  Preanesthetic Checklist  Completed: patient identified, site marked, surgical consent, pre-op evaluation, timeout performed, IV checked, risks and benefits discussed and monitors and equipment checked  Arterial Line Prep   Sterile Tech: cap, gloves and sterile barriers  Prep: ChloraPrep  Patient monitoring: EKG, continuous pulse oximetry and blood pressure monitoring  Arterial Line Procedure   Laterality:left  Location:  radial artery  Catheter size: 20 G   Guidance: ultrasound guided  PROCEDURE NOTE/ULTRASOUND INTERPRETATION.  Using ultrasound guidance the potential vascular sites for insertion of the catheter were visualized to determine the patency of the vessel to be used for vascular access.  After selecting the appropriate site for insertion, the needle was visualized under ultrasound being inserted into the radial artery, followed by ultrasound confirmation of wire and catheter placement. There were no abnormalities seen on ultrasound; an image was taken; and the patient tolerated the procedure with no complications.   Number of attempts: 1  Successful placement: yes          Post Assessment   Dressing Type: wrist guard applied, secured with tape and occlusive dressing applied.   Complications no  Circ/Move/Sens Assessment: normal and unchanged.   Patient Tolerance: patient tolerated the procedure well with no apparent complications

## 2019-03-13 NOTE — BRIEF OP NOTE
PARATHYROIDECTOMY FOUR GLAND WITH AUTOTRANSPLANTATION  Progress Note    Joan Almanza  3/4/2019 - 3/13/2019    Pre-op Diagnosis:   Hypercalcemia [E83.52]  Hyperparathyroidism (CMS/HCC) [E21.3]       Post-Op Diagnosis Codes:     * Hypercalcemia [E83.52]     * Hyperparathyroidism (CMS/HCC) [E21.3]    Procedure/CPT® Codes:      Procedure(s):  LEFT INFERIOR RIGHT SUPERIOR PARATHYRIOD ADENOMA RESECTION BIOPSY OF LEFT SUPERIOR AND INFERIOR PARATHYRIOD    Surgeon(s):  Michelle Reynoso MD    Anesthesia: General    Staff:   Circulator: Sonja Tolentino RN; Mikaela Domingo RN; Genevieve Curtis RN  Scrub Person: Araceli Alvarado; Cecilia Smith  Assistant: Faizan Lion PA    Estimated Blood Loss: minimal    Urine Voided: * No values recorded between 3/13/2019 10:34 AM and 3/13/2019  1:02 PM *    Specimens:                ID Type Source Tests Collected by Time   1 : STAT INTRAOP PTH-- OR NUMBER [id number] Blood Blood, Venous Line PTH INTRAOPERATIVE Michelle Reynoso MD 3/13/2019 1225   A : QUESTION LEFT THYROID VS LEFT PARATHYROID TISSUE Tissue Thyroid TISSUE PATHOLOGY EXAM Michelle Reynoso MD 3/13/2019 1136   B : LEFT INFERIOR PARATHYROID Tissue Parathyroid Gland TISSUE PATHOLOGY EXAM Michelle Reynoso MD 3/13/2019 1149   C : POSSIBLE RIGHT INFERIOR PARATHYROID BIOPSY Tissue Parathyroid Gland TISSUE PATHOLOGY EXAM Michelle Reynoso MD 3/13/2019 1200   D : THYROID NODULE- OR PHONE#[id number] Tissue Thyroid TISSUE PATHOLOGY EXAM Michelle Reynoso MD 3/13/2019 1208   E : RIGHT SUPERIOR PARATHYROID Tissue Parathyroid Gland TISSUE PATHOLOGY EXAM Michelle Reynoso MD 3/13/2019 1212   F : RIGHT THYROID Tissue Thyroid TISSUE PATHOLOGY EXAM Michelle Reynoso MD 3/13/2019 1219   G : BIOPSY LEFT SUPERIOR PRATHYROID Tissue Parathyroid Gland TISSUE PATHOLOGY EXAM Michelle Reynoso MD 3/13/2019 1228         Drains:   Closed/Suction Drain 1 Right Other (Comment) Bulb 19 Fr. (Active)         Michelle Reynoso MD     Date: 3/13/2019  Time: 1:02  PM

## 2019-03-13 NOTE — ANESTHESIA PROCEDURE NOTES
Airway  Urgency: elective    Date/Time: 3/13/2019 10:58 AM  Airway not difficult    General Information and Staff    Patient location during procedure: OR  Anesthesiologist: Vincent Hernandez MD  CRNA: Faizan Linda CRNA    Indications and Patient Condition  Indications for airway management: airway protection    Preoxygenated: yes  Mask difficulty assessment: 1 - vent by mask    Final Airway Details  Final airway type: endotracheal airway      Successful airway: ETT  Cuffed: yes   Successful intubation technique: direct laryngoscopy  Endotracheal tube insertion site: oral  Blade: Alvarado  Blade size: 2  ETT size (mm): 7.0  Cormack-Lehane Classification: grade I - full view of glottis  Placement verified by: chest auscultation and capnometry   Measured from: lips  ETT to lips (cm): 22  Number of attempts at approach: 1    Additional Comments  Pre 02 100%, SIVI, DL x1, atraumatic intubation, BLBS, Positive ETC02.

## 2019-03-13 NOTE — ANESTHESIA POSTPROCEDURE EVALUATION
"Patient: Joan Almanza    Procedure Summary     Date:  03/13/19 Room / Location:  [facility] OR [address] [place] [address] [facility] MA[facility] OR    Anesthesia Start:  1042 Anesthesia Stop:  1322    Procedure:  LEFT INFERIOR RIGHT SUPERIOR PARATHYRIOD ADENOMA RESECTION, BIOPSY OF LEFT SUPERIOR AND INFERIOR PARATHYRIOD (Bilateral Neck) Diagnosis:       Hypercalcemia      Hyperparathyroidism (CMS/HCC)      (Hypercalcemia [E83.52])      (Hyperparathyroidism (CMS/HCC) [E21.3])    Surgeon:  Michelle Reynoso MD Provider:  Vincent Hernandez MD    Anesthesia Type:  general ASA Status:  3          Anesthesia Type: general  Last vitals  BP   150/74 (03/13/19 1430)   Temp   36.4 °C (97.6 °F) (03/13/19 1313)   Pulse   (!) 47 (03/13/19 1430)   Resp   16 (03/13/19 1430)     SpO2   98 % (03/13/19 1430)     Post Anesthesia Care and Evaluation    Patient location during evaluation: PACU  Patient participation: complete - patient participated  Level of consciousness: awake  Pain management: adequate  Airway patency: patent  Anesthetic complications: No anesthetic complications    Cardiovascular status: acceptable  Respiratory status: acceptable  Hydration status: acceptable    Comments: Blood pressure 150/74, pulse (!) 47, temperature 36.4 °C (97.6 °F), temperature source Oral, resp. rate 16, height 157.5 cm (62\"), weight 125 kg (275 lb 9.2 oz), SpO2 98 %.    No anesthesia care post op    "

## 2019-03-13 NOTE — PLAN OF CARE
Problem: Patient Care Overview  Goal: Plan of Care Review  Outcome: Ongoing (interventions implemented as appropriate)   03/13/19 9218   Coping/Psychosocial   Plan of Care Reviewed With patient   OTHER   Outcome Summary meds per order, no falls, skin care per protocol, see labs and vs   Plan of Care Review   Progress improving     Goal: Individualization and Mutuality  Outcome: Ongoing (interventions implemented as appropriate)    Goal: Discharge Needs Assessment  Outcome: Ongoing (interventions implemented as appropriate)    Goal: Interprofessional Rounds/Family Conf  Outcome: Ongoing (interventions implemented as appropriate)      Problem: Skin Injury Risk (Adult)  Goal: Skin Health and Integrity  Outcome: Ongoing (interventions implemented as appropriate)      Problem: Pain, Acute (Adult)  Goal: Acceptable Pain Control/Comfort Level  Outcome: Ongoing (interventions implemented as appropriate)      Problem: Fall Risk (Adult)  Goal: Identify Related Risk Factors and Signs and Symptoms  Outcome: Ongoing (interventions implemented as appropriate)    Goal: Absence of Fall  Outcome: Ongoing (interventions implemented as appropriate)

## 2019-03-13 NOTE — ANESTHESIA PREPROCEDURE EVALUATION
" Anesthesia Evaluation     Patient summary reviewed and Nursing notes reviewed   history of anesthetic complications (after ovarian surgery, \"woke up during\" and then had to receive post op ventilation x 24 hours for unknown reasoon):  NPO Solid Status: > 8 hours  NPO Liquid Status: > 2 hours           Airway   Mallampati: II  TM distance: >3 FB  Neck ROM: full  Possible difficult intubation and Large neck circumference  Dental    (+) poor dentition        Pulmonary    (+) decreased breath sounds,   Cardiovascular     ECG reviewed  Rhythm: regular  Rate: abnormal    (+) hypertension, dysrhythmias (RBBB, LVH) Bradycardia,       Neuro/Psych  (+) poor historian.,     GI/Hepatic/Renal/Endo    (+) obesity, morbid obesity,      Musculoskeletal     Abdominal   (+) obese,    Substance History      OB/GYN          Other        ROS/Med Hx Other: Patient has meningioma history, Left 6th  Nerve palsy    Intracranial meningioma with cerebral edema and 6th nerve palsy, vasogenic edema of temporal lobe      Phys Exam Other: Extremely poor dentition                Anesthesia Plan    ASA 3     general   (BP control (dural AV fistula, 5-10% annual risk of rupture SPONTANEOUSLY)  Morbid obesity with history of Post op respiratory failure)  intravenous induction   Anesthetic plan, all risks, benefits, and alternatives have been provided, discussed and informed consent has been obtained with: patient.      "

## 2019-03-13 NOTE — PLAN OF CARE
Problem: Patient Care Overview  Goal: Plan of Care Review  Outcome: Ongoing (interventions implemented as appropriate)   03/13/19 6378   Coping/Psychosocial   Plan of Care Reviewed With patient   OTHER   Outcome Summary vss, no falls, c/o pain, sore throat, advanced to full liquid, midline neck incision, daniella drain, continue to monitor   Plan of Care Review   Progress no change       Problem: Pain, Acute (Adult)  Goal: Acceptable Pain Control/Comfort Level  Outcome: Ongoing (interventions implemented as appropriate)      Problem: Fall Risk (Adult)  Goal: Absence of Fall  Outcome: Ongoing (interventions implemented as appropriate)

## 2019-03-13 NOTE — PROGRESS NOTES
"DAILY PROGRESS NOTE  [facility]    Patient Identification:  Name: Joan Almanza  Age: 64 y.o.  Sex: female  :  1955  MRN: [id number]         Primary Care Physician: Juarez Winters MD      Subjective  Now post op parathyroidectomy and feeling well.     Objective:  General Appearance:  Comfortable, well-appearing, in no acute distress and not in pain.    Vital signs: (most recent): Blood pressure 145/69, pulse 52, temperature 97.7 °F (36.5 °C), temperature source Oral, resp. rate 16, height 157.5 cm (62\"), weight 125 kg (275 lb 9.2 oz), SpO2 95 %.    Lungs:  Normal effort and normal respiratory rate.  Breath sounds clear to auscultation.    Heart: Normal rate.  Regular rhythm.    Extremities: There is no dependent edema.    Neurological: Patient is alert and oriented to person, place and time.    Skin:  Warm and dry.                Vital signs in last 24 hours:  Temp:  [97.6 °F (36.4 °C)-98.2 °F (36.8 °C)] 97.7 °F (36.5 °C)  Heart Rate:  [42-87] 52  Resp:  [14-20] 16  BP: (143-198)/(52-89) 145/69    Intake/Output:    Intake/Output Summary (Last 24 hours) at 3/13/2019 1907  Last data filed at 3/13/2019 1735  Gross per 24 hour   Intake 3084.25 ml   Output 2660 ml   Net 424.25 ml         Results from last 7 days   Lab Units 19  0538 03/10/19  0539 19  05   WBC 10*3/mm3 16.96* 15.39* 16.11* 13.54*   HEMOGLOBIN g/dL 15.3 14.3 15.2 14.0   PLATELETS 10*3/mm3 232 214 250 226     Results from last 7 days   Lab Units 19  0904 19  0538 03/10/19  0539 19   SODIUM mmol/L 139 137 140 140 141   POTASSIUM mmol/L 4.2 4.3 4.5 4.2 3.4*   CHLORIDE mmol/L 102 102 107 109* 106   CO2 mmol/L 23.5 21.4* 22.5 22.9 23.8   BUN mg/dL 21 18 19 17 21   CREATININE mg/dL 0.86 0.71 0.72 0.61 0.66   GLUCOSE mg/dL 134* 127* 126* 110* 127*   Estimated Creatinine Clearance: 83.6 mL/min (by C-G formula based on SCr of 0.86 mg/dL).  Results from last 7 days "   Lab Units 03/13/19  1715 03/13/19  1404 03/13/19  0417 03/12/19  0904 03/11/19  0538 03/10/19  0539 03/09/19  0522   CALCIUM mg/dL 11.3* 11.2* 11.9* 12.2* 11.6* 11.5* 12.0*   ALBUMIN g/dL  --   --   --   --  3.50  --   --    PHOSPHORUS mg/dL  --   --   --   --  1.8*  --   --      Results from last 7 days   Lab Units 03/11/19  0538   ALBUMIN g/dL 3.50       Assessment:  Diplopia - 6th nerve palsy.   Vasogenic edema lt temporal lobe.   Dural AV fistula   Meningioma  Bradycardia  Hyperparathyroidism:  s/p Parathyroidectomy. Monitor Ca... Very closely.  Vit D, Ca supplement.   Morbid obesity:         Plan:  Please see above.     Edward Peralta MD  3/13/2019  7:07 PM

## 2019-03-13 NOTE — OP NOTE
Operative Note  Parathyroid  03/13/19      Pre-op Diagnosis:   · Hyperparathyroidism, with negative SPECT nuclear at [facility]  · Hypercalcemia, with levels as high as 13.3    Post-op Diagnosis:  · Parathyroid adenoma, right superior and left inferior    Procedure:   · Parathyroid resection, right superior and left inferior  · Parathyroid biopsy, right inferior and left superior  · Resection right thyroid nodule.    Surgeon: Angeles    Assistant: symone    Indications:  · Nice 64 year old who came in with diplopia, and was found to have a meningioma and AV fistula on radiographic studies and is being treated with prednisone.  She also was found to have recalcitrant hypercalcemia and is now planned for parathyroid exploration.    Associated Issues:  · Body mass index is 51.17 kg/m².  · Pre op calcium after calcitonin: 11.9  · Pre op PTH: 309  · Pre op vit D: 36.2    Findings:   · Intraoperative STAT PTH preoperatively 339.7  · Intraoperative STAT PTH post-resection 55  · Approach challenging due to BMI, positioning issues.  · Gross findings:  One very large 1.7 cm right superior adenoma, slightly enlarged 1 cm left inferior parathyroid, right inferior 4 mm, left superior 4 mm  · Pathology frozen:    Right superior gland, hypercellular 940 mg   Right inferior biopsy (1/2 gland), normocellular, 14 mg   Left superior biopsy (1/2 gland), normocellular, 72 mg   Left inferior gland, hypercellular with possible adenoma, 197 mg   Thyroid nodule, right, benoign    Recommendations:   · Begin on calcium supplementation and check calcium in post op    EBL: <50 cc    Technique:     General anesthetic was induced.  IV vancomycin was given.  The neck was extended, and then prepped with Hibiclens and draped sterilely.    The neck was examined and the skin creases evaluated to determine the best location for the incision, attempting to maximize both operative exposure and post op cosmesis.  I selected a location that was not at a skin  ivan, desiring exposure as the critical issue with her BMI, and marked.      1/2% Marcaine with epinephrine was used to inject the site.  Incision was made thru the skin and subcutaneous space and then completed with cautery to the cervical fascia.  Flaps were then raised with the facelift retractors and cautery, directly on the anterior aspect of the fascia, both superiorly and inferiorly.  The strap muscles were then divided in the midline.    Dissection was then begun under the strap muscle on the left side.  The bipolar on 15 was used.  Quickly, the left inferior was ID'd and thought to be enlarged.  In addition, there was a nodule on the thyroid that i thought was thryoid but took that out and sent, with expected path of thyroid.  The left inferior was removed with a clip on the base, doing so with the bipolar.  The left superior was sought but not found at this time.    I went to the right side and found a very large superior and with strong retraction, as it was at the pharyngoesophageal margin, i lifted it and had to dissect if from surrounding tissue, it being fibrosed in.  It broke in half, but i got all portions.  The nerve was ID'd prior to this removal.      I then looked at the inferior gland and located it, being very small, and took half for path.  The left superior was then found as well.  Having found all 4, i closed prior to getting our final PTH, having to draw that from the left IJ as the arm IV would not draw back as we had planned for.      The nerve on both sides was confirmed OK.  The thyroid had to be cauterized on both sides due to tearing at the nodular site.  surgicell and farhana on both sides and removed and repeated.    A drain was placed, 19 [place].  The wound was then closed with a running 3-0 Vicryl to the midline strap muscles, followed by interrupted 3-0 vicryl to the platysma, interrupted 3-0 vicryl to the subcutaneous, and running 5-0 vicryl to the skin.  Dermabond was applied  to the wound.    Michelle Reynoso MD  03/13/19

## 2019-03-14 LAB
ANION GAP SERPL CALCULATED.3IONS-SCNC: 12.1 MMOL/L
BASOPHILS # BLD AUTO: 0.07 10*3/MM3 (ref 0–0.2)
BASOPHILS NFR BLD AUTO: 0.3 % (ref 0–1.5)
BUN BLD-MCNC: 24 MG/DL (ref 8–23)
BUN/CREAT SERPL: 26.1 (ref 7–25)
CA-I BLD-MCNC: 6.3 MG/DL (ref 4.6–5.4)
CA-I SERPL ISE-MCNC: 1.57 MMOL/L (ref 1.15–1.35)
CALCIUM SPEC-SCNC: 10.8 MG/DL (ref 8.6–10.5)
CALCIUM SPEC-SCNC: 11.5 MG/DL (ref 8.6–10.5)
CALCIUM SPEC-SCNC: 11.6 MG/DL (ref 8.6–10.5)
CHLORIDE SERPL-SCNC: 96 MMOL/L (ref 98–107)
CO2 SERPL-SCNC: 28.9 MMOL/L (ref 22–29)
CREAT BLD-MCNC: 0.92 MG/DL (ref 0.57–1)
CYTO UR: NORMAL
DEPRECATED RDW RBC AUTO: 43.5 FL (ref 37–54)
EOSINOPHIL # BLD AUTO: 0.03 10*3/MM3 (ref 0–0.4)
EOSINOPHIL NFR BLD AUTO: 0.1 % (ref 0.3–6.2)
ERYTHROCYTE [DISTWIDTH] IN BLOOD BY AUTOMATED COUNT: 13.8 % (ref 12.3–15.4)
GFR SERPL CREATININE-BSD FRML MDRD: 61 ML/MIN/1.73
GLUCOSE BLD-MCNC: 98 MG/DL (ref 65–99)
HCT VFR BLD AUTO: 50.4 % (ref 34–46.6)
HGB BLD-MCNC: 16.7 G/DL (ref 12–15.9)
IMM GRANULOCYTES # BLD AUTO: 0.67 10*3/MM3 (ref 0–0.05)
IMM GRANULOCYTES NFR BLD AUTO: 3 % (ref 0–0.5)
LAB AP CASE REPORT: NORMAL
LYMPHOCYTES # BLD AUTO: 1.82 10*3/MM3 (ref 0.7–3.1)
LYMPHOCYTES NFR BLD AUTO: 8.2 % (ref 19.6–45.3)
Lab: NORMAL
MCH RBC QN AUTO: 28.9 PG (ref 26.6–33)
MCHC RBC AUTO-ENTMCNC: 33.1 G/DL (ref 31.5–35.7)
MCV RBC AUTO: 87.2 FL (ref 79–97)
MONOCYTES # BLD AUTO: 2.45 10*3/MM3 (ref 0.1–0.9)
MONOCYTES NFR BLD AUTO: 11.1 % (ref 5–12)
NEUTROPHILS # BLD AUTO: 17.1 10*3/MM3 (ref 1.4–7)
NEUTROPHILS NFR BLD AUTO: 77.3 % (ref 42.7–76)
NRBC BLD AUTO-RTO: 0 /100 WBC (ref 0–0)
PATH REPORT.FINAL DX SPEC: NORMAL
PATH REPORT.GROSS SPEC: NORMAL
PHOSPHATE SERPL-MCNC: 2.4 MG/DL (ref 2.5–4.5)
PLATELET # BLD AUTO: 213 10*3/MM3 (ref 140–450)
PMV BLD AUTO: 10.6 FL (ref 6–12)
POTASSIUM BLD-SCNC: 4.4 MMOL/L (ref 3.5–5.2)
PTH-INTACT SERPL-MCNC: 3.9 PG/ML (ref 15–65)
PTH-INTACT SERPL-MCNC: 3.9 PG/ML (ref 15–65)
RBC # BLD AUTO: 5.78 10*6/MM3 (ref 3.77–5.28)
SODIUM BLD-SCNC: 137 MMOL/L (ref 136–145)
WBC NRBC COR # BLD: 22.14 10*3/MM3 (ref 3.4–10.8)

## 2019-03-14 PROCEDURE — 85025 COMPLETE CBC W/AUTO DIFF WBC: CPT | Performed by: HOSPITALIST

## 2019-03-14 PROCEDURE — 83970 ASSAY OF PARATHORMONE: CPT | Performed by: SURGERY

## 2019-03-14 PROCEDURE — 83970 ASSAY OF PARATHORMONE: CPT | Performed by: INTERNAL MEDICINE

## 2019-03-14 PROCEDURE — 82330 ASSAY OF CALCIUM: CPT | Performed by: HOSPITALIST

## 2019-03-14 PROCEDURE — 82310 ASSAY OF CALCIUM: CPT | Performed by: SURGERY

## 2019-03-14 PROCEDURE — 25010000002 FUROSEMIDE PER 20 MG: Performed by: INTERNAL MEDICINE

## 2019-03-14 PROCEDURE — 84100 ASSAY OF PHOSPHORUS: CPT | Performed by: HOSPITALIST

## 2019-03-14 PROCEDURE — 80048 BASIC METABOLIC PNL TOTAL CA: CPT | Performed by: HOSPITALIST

## 2019-03-14 PROCEDURE — 99024 POSTOP FOLLOW-UP VISIT: CPT | Performed by: SURGERY

## 2019-03-14 PROCEDURE — 63710000001 METHYLPREDNISOLONE 4 MG TABLET THERAPY PACK 21 EACH DISP PACK: Performed by: NURSE PRACTITIONER

## 2019-03-14 RX ORDER — CALCITRIOL 0.25 UG/1
0.5 CAPSULE, LIQUID FILLED ORAL DAILY
Status: DISCONTINUED | OUTPATIENT
Start: 2019-03-14 | End: 2019-03-15

## 2019-03-14 RX ORDER — CALCITRIOL 0.25 UG/1
0.25 CAPSULE, LIQUID FILLED ORAL DAILY
Status: DISCONTINUED | OUTPATIENT
Start: 2019-03-14 | End: 2019-03-14

## 2019-03-14 RX ORDER — FUROSEMIDE 10 MG/ML
20 INJECTION INTRAMUSCULAR; INTRAVENOUS EVERY 12 HOURS
Status: DISCONTINUED | OUTPATIENT
Start: 2019-03-14 | End: 2019-03-17

## 2019-03-14 RX ADMIN — METHYLPREDNISOLONE 4 MG: 4 TABLET ORAL at 17:24

## 2019-03-14 RX ADMIN — METHYLPREDNISOLONE 4 MG: 4 TABLET ORAL at 21:21

## 2019-03-14 RX ADMIN — FAMOTIDINE 20 MG: 20 TABLET, FILM COATED ORAL at 08:33

## 2019-03-14 RX ADMIN — FUROSEMIDE 20 MG: 10 INJECTION, SOLUTION INTRAMUSCULAR; INTRAVENOUS at 21:20

## 2019-03-14 RX ADMIN — HYDRALAZINE HYDROCHLORIDE 25 MG: 25 TABLET ORAL at 07:20

## 2019-03-14 RX ADMIN — METHYLPREDNISOLONE 4 MG: 4 TABLET ORAL at 13:52

## 2019-03-14 RX ADMIN — FAMOTIDINE 20 MG: 20 TABLET, FILM COATED ORAL at 21:20

## 2019-03-14 RX ADMIN — CALCIUM CARBONATE-VITAMIN D TAB 500 MG-200 UNIT 1000 MG: 500-200 TAB at 17:24

## 2019-03-14 RX ADMIN — SODIUM CHLORIDE, PRESERVATIVE FREE 3 ML: 5 INJECTION INTRAVENOUS at 08:39

## 2019-03-14 RX ADMIN — HYDROCODONE BITARTRATE AND ACETAMINOPHEN 1 TABLET: 5; 325 TABLET ORAL at 12:05

## 2019-03-14 RX ADMIN — HYDRALAZINE HYDROCHLORIDE 25 MG: 25 TABLET ORAL at 21:20

## 2019-03-14 RX ADMIN — POTASSIUM CHLORIDE 80 MEQ: 750 CAPSULE, EXTENDED RELEASE ORAL at 08:33

## 2019-03-14 RX ADMIN — CALCITRIOL CAPSULES 0.25 MCG 0.5 MCG: 0.25 CAPSULE ORAL at 13:53

## 2019-03-14 RX ADMIN — METHYLPREDNISOLONE 4 MG: 4 TABLET ORAL at 08:33

## 2019-03-14 RX ADMIN — FUROSEMIDE 20 MG: 10 INJECTION, SOLUTION INTRAMUSCULAR; INTRAVENOUS at 08:33

## 2019-03-14 RX ADMIN — HYDROCODONE BITARTRATE AND ACETAMINOPHEN 1 TABLET: 7.5; 325 TABLET ORAL at 08:33

## 2019-03-14 RX ADMIN — AMLODIPINE BESYLATE 10 MG: 10 TABLET ORAL at 08:33

## 2019-03-14 RX ADMIN — HYDRALAZINE HYDROCHLORIDE 25 MG: 25 TABLET ORAL at 13:52

## 2019-03-14 RX ADMIN — SODIUM CHLORIDE, PRESERVATIVE FREE 3 ML: 5 INJECTION INTRAVENOUS at 21:21

## 2019-03-14 NOTE — PROGRESS NOTES
"   LOS: 10 days    Patient Care Team:  Juarez Winters MD as PCP - General (Family Medicine)    Chief Complaint:    Chief Complaint   Patient presents with   • Headache   • Eye Pain   • Diplopia     Follow UP Hypercalcemia  Subjective     Interval History:     S:  Mel nd examined. No SOA or CP. S/P surgery. Soar neck. No other issues.    Objective     Vital Signs  Temp:  [97.6 °F (36.4 °C)-98.7 °F (37.1 °C)] 98 °F (36.7 °C)  Heart Rate:  [45-67] 55  Resp:  [14-20] 18  BP: (131-179)/(52-86) 142/68    Flowsheet Rows      First Filed Value   Admission Height  157.5 cm (62\") Documented at 03/04/2019 1050   Admission Weight  122 kg (270 lb) Documented at 03/04/2019 1050          No intake/output data recorded.  I/O last 3 completed shifts:  In: 4502.3 [P.O.:1080; I.V.:3322.3; IV Piggyback:100]  Out: 5310 [Urine:5150; Drains:160]    Intake/Output Summary (Last 24 hours) at 3/14/2019 0818  Last data filed at 3/14/2019 0655  Gross per 24 hour   Intake 2784.25 ml   Output 3610 ml   Net -825.75 ml       Physical Exam:  Exam:  GEN:               No distress, appears stated age  EYES:              Anicteric sclera  ENT:                External ears/nose normal, MM are moist  NECK:             No adenopathy, JVP none  LUNGS:           Normal chest on inspection; not labored  CV:                  Normal S1S2, without murmur  ABD:                Non-tender, non-distended, no hepatosplenomegaly, +BS  EXT:                No edema; no cyanosis; clubbing       Results Review:    Results from last 7 days   Lab Units 03/14/19  0615 03/13/19  1715 03/13/19  1404  03/12/19  0904 03/11/19  0538   SODIUM mmol/L 137  --   --   --  139 137   POTASSIUM mmol/L 4.4  --   --   --  4.2 4.3   CHLORIDE mmol/L 96*  --   --   --  102 102   CO2 mmol/L 28.9  --   --   --  23.5 21.4*   BUN mg/dL 24*  --   --   --  21 18   CREATININE mg/dL 0.92  --   --   --  0.86 0.71   CALCIUM mg/dL 11.6* 11.3* 11.2*   < > 12.2* 11.6*   GLUCOSE mg/dL 98  --   --   " --  134* 127*    < > = values in this interval not displayed.       Estimated Creatinine Clearance: 78.9 mL/min (by C-G formula based on SCr of 0.92 mg/dL).    Results from last 7 days   Lab Units 03/14/19  0615 03/11/19  0538   PHOSPHORUS mg/dL 2.4* 1.8*             Results from last 7 days   Lab Units 03/14/19  0615 03/11/19  0538 03/10/19  0539 03/09/19  0522 03/08/19  0522   WBC 10*3/mm3 22.14* 16.96* 15.39* 16.11* 13.54*   HEMOGLOBIN g/dL 16.7* 15.3 14.3 15.2 14.0   PLATELETS 10*3/mm3 213 232 214 250 226               Imaging Results (last 24 hours)     ** No results found for the last 24 hours. **          amLODIPine 10 mg Oral Q24H   docusate sodium 100 mg Oral BID   famotidine 20 mg Oral BID   furosemide 20 mg Intravenous Daily   hydrALAZINE 25 mg Oral Q8H   methylPREDNISolone 4 mg Oral 4x Daily Taper   [START ON 3/15/2019] methylPREDNISolone 4 mg Oral TID Around Food   [START ON 3/16/2019] methylPREDNISolone 4 mg Oral Before Breakfast   [START ON 3/16/2019] methylPREDNISolone 4 mg Oral Tonight   [START ON 3/17/2019] methylPREDNISolone 4 mg Oral Before Breakfast   potassium chloride 80 mEq Oral Daily   sodium chloride 3 mL Intravenous Q12H       sodium chloride 45 mL/hr Last Rate: 45 mL/hr (03/13/19 1536)       Medication Review:   Current Facility-Administered Medications   Medication Dose Route Frequency Provider Last Rate Last Dose   • acetaminophen (TYLENOL) tablet 650 mg  650 mg Oral Q4H PRN Michelle Reynoso MD        Or   • acetaminophen (TYLENOL) 160 MG/5ML solution 650 mg  650 mg Oral Q4H PRN Michelle Reynoso MD        Or   • acetaminophen (TYLENOL) suppository 650 mg  650 mg Rectal Q4H PRN Michelle Reynoso MD       • acetaminophen (TYLENOL) tablet 650 mg  650 mg Oral Q4H PRN Alice Simmons MD   650 mg at 03/12/19 1743   • amLODIPine (NORVASC) tablet 10 mg  10 mg Oral Q24H Carol Ann Johnson APRN   10 mg at 03/13/19 1622   • docusate sodium (COLACE) capsule 100 mg  100 mg Oral BID Pranav Galindo MD   100  mg at 03/12/19 0940   • famotidine (PEPCID) tablet 20 mg  20 mg Oral BID Elisabet Skinner APRN   20 mg at 03/12/19 2131   • furosemide (LASIX) injection 20 mg  20 mg Intravenous Daily So Foley MD   20 mg at 03/13/19 1622   • hydrALAZINE (APRESOLINE) injection 10 mg  10 mg Intravenous Q6H PRN Carol Ann Johnson, APRN       • hydrALAZINE (APRESOLINE) tablet 25 mg  25 mg Oral Q8H So Foley MD   25 mg at 03/14/19 0720   • HYDROcodone-acetaminophen (NORCO)  MG per tablet 1 tablet  1 tablet Oral Q4H PRN Michelle Reynoso MD   1 tablet at 03/13/19 1730   • HYDROcodone-acetaminophen (NORCO) 5-325 MG per tablet 1 tablet  1 tablet Oral Q4H PRN Michelle Reynoso MD       • HYDROcodone-acetaminophen (NORCO) 7.5-325 MG per tablet 1 tablet  1 tablet Oral Q4H PRN Surjit Ellsworth MD   1 tablet at 03/13/19 2203   • HYDROmorphone (DILAUDID) injection 0.5 mg  0.5 mg Intravenous Q2H PRN Alice Simmons MD        And   • naloxone (NARCAN) injection 0.4 mg  0.4 mg Intravenous Q5 Min PRN Alice Simmons MD       • HYDROmorphone (DILAUDID) injection 0.5 mg  0.5 mg Intravenous Q2H PRN Michelle Reynoso MD        And   • naloxone (NARCAN) injection 0.1 mg  0.1 mg Intravenous Q5 Min PRN Michelle Reynoso MD       • methylPREDNISolone (MEDROL (ANEESH)) tablet 4 mg  4 mg Oral 4x Daily Taper Lucero Johnson., APRN       • [START ON 3/15/2019] methylPREDNISolone (MEDROL (ANEESH)) tablet 4 mg  4 mg Oral TID Around Food Lucero Johnson., APRN       • [START ON 3/16/2019] methylPREDNISolone (MEDROL (ANEESH)) tablet 4 mg  4 mg Oral Before Breakfast Lucero Johnson., APRN       • [START ON 3/16/2019] methylPREDNISolone (MEDROL (ANEESH)) tablet 4 mg  4 mg Oral Tonight Lucero Johnson APRN       • [START ON 3/17/2019] methylPREDNISolone (MEDROL (ANEESH)) tablet 4 mg  4 mg Oral Before Breakfast Lucero Johnson APRN       • morphine injection 4 mg  4 mg Intravenous Q2H PRN Michelle Reynoso MD        And   • naloxone (NARCAN) injection 0.4 mg  0.4 mg  Intravenous Q5 Min PRN Michelle Reynoso MD       • nitroglycerin (NITROSTAT) SL tablet 0.4 mg  0.4 mg Sublingual Q5 Min PRN Alice Simmons MD       • ondansetron (ZOFRAN) injection 4 mg  4 mg Intravenous Q6H PRN Alice Simmons MD   4 mg at 03/11/19 0639   • potassium chloride (MICRO-K) CR capsule 80 mEq  80 mEq Oral Daily Ty Lofton MD   80 mEq at 03/13/19 1623   • sodium chloride 0.45 % infusion  45 mL/hr Intravenous Continuous Michelle Reynoso MD 45 mL/hr at 03/13/19 1536 45 mL/hr at 03/13/19 1536   • sodium chloride 0.9 % flush 10 mL  10 mL Intravenous PRN Bull Branch II, MD       • sodium chloride 0.9 % flush 3 mL  3 mL Intravenous Q12H Alice Simmons MD   3 mL at 03/13/19 2156   • sodium chloride 0.9 % flush 3-10 mL  3-10 mL Intravenous PRN Alice Simmons MD           Assessment/Plan         Diplopia    Cerebral edema (CMS/HCC)    Meningioma (CMS/HCC)    AVF (arteriovenous fistula) (CMS/HCC)    6th nerve palsy, left    Hypercalcemia    Hyperparathyroidism (CMS/HCC)    Morbid obesity (CMS/HCC)    1. Hypercalcemia:  thiazide, hyperparathyroidism all contributing etiologies     2. Hypertension- Hold Thiazide.  Uncontrolled.  Bradycardia noted  3. Intracranial meningioma with cerebral edema and 6th nerve palsy  4.  Severe obesity:  BMI 52     Plan:     S/P left inferior and right superior adenoma resection. PTH was 55 during surgery. Calcium still high. Will repeat PTH  Will monitor calcium closely after surgery  Surveillance       Ty Lofton MD  03/14/19  8:18 AM

## 2019-03-14 NOTE — PLAN OF CARE
Problem: Patient Care Overview  Goal: Plan of Care Review  Outcome: Ongoing (interventions implemented as appropriate)   03/14/19 0643   Coping/Psychosocial   Plan of Care Reviewed With patient   OTHER   Outcome Summary JANAY with bloody drng. Pt using purewick this night, s/p diuretic administration and surgery. Maintaining SBP less than 150.    Plan of Care Review   Progress no change     Goal: Individualization and Mutuality  Outcome: Ongoing (interventions implemented as appropriate)      Problem: Skin Injury Risk (Adult)  Goal: Skin Health and Integrity  Outcome: Ongoing (interventions implemented as appropriate)      Problem: Pain, Acute (Adult)  Goal: Acceptable Pain Control/Comfort Level  Outcome: Ongoing (interventions implemented as appropriate)      Problem: Fall Risk (Adult)  Goal: Identify Related Risk Factors and Signs and Symptoms  Outcome: Ongoing (interventions implemented as appropriate)    Goal: Absence of Fall  Outcome: Ongoing (interventions implemented as appropriate)

## 2019-03-14 NOTE — PROGRESS NOTES
"GENERAL SURGERY  Joan Almanza  1955  1 Day Post-Op    CC:  \"i'm here\"    HPI:  Feeling OK.  Calcium surprisingly high but did get calcitriol po yesterday as well as calcium.  Her PTH this morning was 3.9.  This is extremely low and she will need supplements at discharge, most likely, oscal D and calcitriol.  Based on the extremely low PTH, i'm going to add back the calcitriol, which i had stopped last night.  I will check another calcium this afternoon.    Diet:  regular     Vitals:    03/14/19 0300 03/14/19 0455 03/14/19 0500 03/14/19 0703   BP:    142/68   BP Location:    Left arm   Patient Position:    Lying   Pulse: (!) 48  55    Resp:    18   Temp:    98 °F (36.7 °C)   TempSrc:    Oral   SpO2: 93%      Weight:  127 kg (279 lb 12.2 oz)     Height:         Intake & Output (last day)       03/13 0701 - 03/14 0700 03/14 0701 - 03/15 0700    P.O. 600 360    I.V. (mL/kg) 2084.3 (16.4)     IV Piggyback 100     Total Intake(mL/kg) 2784.3 (21.9) 360 (2.8)    Urine (mL/kg/hr) 3450 (1.1)     Drains 160     Total Output 3610     Net -825.8 +360          Urine Unmeasured Occurrence 1 x           Physical Exam    Pertinent labs/imaging:  Results from last 7 days   Lab Units 03/14/19  0615   WBC 10*3/mm3 22.14*   HEMOGLOBIN g/dL 16.7*   HEMATOCRIT % 50.4*   PLATELETS 10*3/mm3 213     Results from last 7 days   Lab Units 03/14/19  0615 03/13/19  1715 03/13/19  1404  03/12/19  0904 03/11/19  0538   SODIUM mmol/L 137  --   --   --  139 137   POTASSIUM mmol/L 4.4  --   --   --  4.2 4.3   CHLORIDE mmol/L 96*  --   --   --  102 102   CO2 mmol/L 28.9  --   --   --  23.5 21.4*   BUN mg/dL 24*  --   --   --  21 18   CREATININE mg/dL 0.92  --   --   --  0.86 0.71   CALCIUM mg/dL 11.6* 11.3* 11.2*   < > 12.2* 11.6*   GLUCOSE mg/dL 98  --   --   --  134* 127*    < > = values in this interval not displayed.       Assessment:   · 1 Day Post-Op S/P 2 parathyroid resection, 2 biopsy.  Despite PTH going to 55 yesterday, calcium going " up.  PTH 3.9, extremely low.  · Hypercalcemia  · meningioma 2.8 cm, with additional 6 mm meningioma.  · Diplopia   · Cerebral edema, on steroids  · Body mass index is 52.1 kg/m².   · SOB when recumbent likely related to abdominal pressure on the diaphragm.    Plan  · Hold calcium supplement.  Resume calcitriol with PTH this low this morning.  · Check intact PTH and calcium in AM    Michelle Reynoso MD  03/14/19  12:23 PM

## 2019-03-15 LAB
ANION GAP SERPL CALCULATED.3IONS-SCNC: 16.2 MMOL/L
BASOPHILS # BLD AUTO: 0.07 10*3/MM3 (ref 0–0.2)
BASOPHILS NFR BLD AUTO: 0.3 % (ref 0–1.5)
BUN BLD-MCNC: 35 MG/DL (ref 8–23)
BUN/CREAT SERPL: 32.4 (ref 7–25)
CA-I BLD-MCNC: 5.4 MG/DL (ref 4.6–5.4)
CA-I SERPL ISE-MCNC: 1.34 MMOL/L (ref 1.15–1.35)
CALCIUM SPEC-SCNC: 10.1 MG/DL (ref 8.6–10.5)
CALCIUM SPEC-SCNC: 10.3 MG/DL (ref 8.6–10.5)
CHLORIDE SERPL-SCNC: 91 MMOL/L (ref 98–107)
CO2 SERPL-SCNC: 26.8 MMOL/L (ref 22–29)
CREAT BLD-MCNC: 1.08 MG/DL (ref 0.57–1)
DEPRECATED RDW RBC AUTO: 44.5 FL (ref 37–54)
EOSINOPHIL # BLD AUTO: 0.07 10*3/MM3 (ref 0–0.4)
EOSINOPHIL NFR BLD AUTO: 0.3 % (ref 0.3–6.2)
ERYTHROCYTE [DISTWIDTH] IN BLOOD BY AUTOMATED COUNT: 13.7 % (ref 12.3–15.4)
GFR SERPL CREATININE-BSD FRML MDRD: 51 ML/MIN/1.73
GLUCOSE BLD-MCNC: 153 MG/DL (ref 65–99)
HCT VFR BLD AUTO: 51.7 % (ref 34–46.6)
HGB BLD-MCNC: 16.9 G/DL (ref 12–15.9)
IMM GRANULOCYTES # BLD AUTO: 0.82 10*3/MM3 (ref 0–0.05)
IMM GRANULOCYTES NFR BLD AUTO: 3.3 % (ref 0–0.5)
LYMPHOCYTES # BLD AUTO: 2.23 10*3/MM3 (ref 0.7–3.1)
LYMPHOCYTES NFR BLD AUTO: 8.9 % (ref 19.6–45.3)
MCH RBC QN AUTO: 28.8 PG (ref 26.6–33)
MCHC RBC AUTO-ENTMCNC: 32.7 G/DL (ref 31.5–35.7)
MCV RBC AUTO: 88.2 FL (ref 79–97)
MONOCYTES # BLD AUTO: 2.21 10*3/MM3 (ref 0.1–0.9)
MONOCYTES NFR BLD AUTO: 8.8 % (ref 5–12)
NEUTROPHILS # BLD AUTO: 19.75 10*3/MM3 (ref 1.4–7)
NEUTROPHILS NFR BLD AUTO: 78.4 % (ref 42.7–76)
NRBC BLD AUTO-RTO: 0 /100 WBC (ref 0–0)
PHOSPHATE SERPL-MCNC: 3.3 MG/DL (ref 2.5–4.5)
PLATELET # BLD AUTO: 221 10*3/MM3 (ref 140–450)
PMV BLD AUTO: 10.5 FL (ref 6–12)
POTASSIUM BLD-SCNC: 4.1 MMOL/L (ref 3.5–5.2)
RBC # BLD AUTO: 5.86 10*6/MM3 (ref 3.77–5.28)
SODIUM BLD-SCNC: 134 MMOL/L (ref 136–145)
WBC NRBC COR # BLD: 25.15 10*3/MM3 (ref 3.4–10.8)

## 2019-03-15 PROCEDURE — 84100 ASSAY OF PHOSPHORUS: CPT | Performed by: HOSPITALIST

## 2019-03-15 PROCEDURE — 82310 ASSAY OF CALCIUM: CPT | Performed by: SURGERY

## 2019-03-15 PROCEDURE — 99024 POSTOP FOLLOW-UP VISIT: CPT | Performed by: SURGERY

## 2019-03-15 PROCEDURE — 63710000001 METHYLPREDNISOLONE 4 MG TABLET THERAPY PACK 21 EACH DISP PACK: Performed by: NURSE PRACTITIONER

## 2019-03-15 PROCEDURE — 25010000002 ONDANSETRON PER 1 MG: Performed by: INTERNAL MEDICINE

## 2019-03-15 PROCEDURE — 93005 ELECTROCARDIOGRAM TRACING: CPT | Performed by: HOSPITALIST

## 2019-03-15 PROCEDURE — 93010 ELECTROCARDIOGRAM REPORT: CPT | Performed by: INTERNAL MEDICINE

## 2019-03-15 PROCEDURE — 82330 ASSAY OF CALCIUM: CPT | Performed by: HOSPITALIST

## 2019-03-15 PROCEDURE — 80048 BASIC METABOLIC PNL TOTAL CA: CPT | Performed by: HOSPITALIST

## 2019-03-15 PROCEDURE — 25010000002 FUROSEMIDE PER 20 MG: Performed by: INTERNAL MEDICINE

## 2019-03-15 PROCEDURE — 85025 COMPLETE CBC W/AUTO DIFF WBC: CPT | Performed by: HOSPITALIST

## 2019-03-15 RX ORDER — CALCITRIOL 0.25 UG/1
0.25 CAPSULE, LIQUID FILLED ORAL DAILY
Status: DISCONTINUED | OUTPATIENT
Start: 2019-03-16 | End: 2019-03-17

## 2019-03-15 RX ADMIN — CALCITRIOL CAPSULES 0.25 MCG 0.5 MCG: 0.25 CAPSULE ORAL at 09:49

## 2019-03-15 RX ADMIN — FUROSEMIDE 20 MG: 10 INJECTION, SOLUTION INTRAMUSCULAR; INTRAVENOUS at 09:49

## 2019-03-15 RX ADMIN — METHYLPREDNISOLONE 4 MG: 4 TABLET ORAL at 15:12

## 2019-03-15 RX ADMIN — HYDRALAZINE HYDROCHLORIDE 25 MG: 25 TABLET ORAL at 21:15

## 2019-03-15 RX ADMIN — FUROSEMIDE 20 MG: 10 INJECTION, SOLUTION INTRAMUSCULAR; INTRAVENOUS at 21:15

## 2019-03-15 RX ADMIN — SODIUM CHLORIDE, PRESERVATIVE FREE 3 ML: 5 INJECTION INTRAVENOUS at 21:15

## 2019-03-15 RX ADMIN — CALCIUM CARBONATE-VITAMIN D TAB 500 MG-200 UNIT 1000 MG: 500-200 TAB at 09:49

## 2019-03-15 RX ADMIN — ONDANSETRON HYDROCHLORIDE 4 MG: 2 SOLUTION INTRAMUSCULAR; INTRAVENOUS at 12:24

## 2019-03-15 RX ADMIN — METHYLPREDNISOLONE 4 MG: 4 TABLET ORAL at 06:37

## 2019-03-15 RX ADMIN — POTASSIUM CHLORIDE 80 MEQ: 750 CAPSULE, EXTENDED RELEASE ORAL at 09:49

## 2019-03-15 RX ADMIN — AMLODIPINE BESYLATE 10 MG: 10 TABLET ORAL at 09:49

## 2019-03-15 RX ADMIN — CALCIUM CARBONATE-VITAMIN D TAB 500 MG-200 UNIT 1000 MG: 500-200 TAB at 21:15

## 2019-03-15 RX ADMIN — METHYLPREDNISOLONE 4 MG: 4 TABLET ORAL at 18:08

## 2019-03-15 RX ADMIN — HYDRALAZINE HYDROCHLORIDE 25 MG: 25 TABLET ORAL at 15:11

## 2019-03-15 RX ADMIN — DOCUSATE SODIUM 100 MG: 100 CAPSULE, LIQUID FILLED ORAL at 21:17

## 2019-03-15 RX ADMIN — FAMOTIDINE 20 MG: 20 TABLET, FILM COATED ORAL at 21:15

## 2019-03-15 RX ADMIN — SODIUM CHLORIDE, PRESERVATIVE FREE 3 ML: 5 INJECTION INTRAVENOUS at 09:50

## 2019-03-15 RX ADMIN — HYDRALAZINE HYDROCHLORIDE 25 MG: 25 TABLET ORAL at 06:37

## 2019-03-15 RX ADMIN — HYDROCODONE BITARTRATE AND ACETAMINOPHEN 1 TABLET: 5; 325 TABLET ORAL at 06:39

## 2019-03-15 RX ADMIN — FAMOTIDINE 20 MG: 20 TABLET, FILM COATED ORAL at 09:49

## 2019-03-15 NOTE — PLAN OF CARE
Problem: Patient Care Overview  Goal: Plan of Care Review  Outcome: Ongoing (interventions implemented as appropriate)   03/15/19 0942   Coping/Psychosocial   Plan of Care Reviewed With patient   OTHER   Outcome Summary Pt up ad anson. JANAY remains in place with small amt of bloody drainage. Pt in good spirits.    Plan of Care Review   Progress improving     Goal: Individualization and Mutuality  Outcome: Ongoing (interventions implemented as appropriate)      Problem: Skin Injury Risk (Adult)  Goal: Skin Health and Integrity  Outcome: Ongoing (interventions implemented as appropriate)      Problem: Pain, Acute (Adult)  Goal: Acceptable Pain Control/Comfort Level  Outcome: Ongoing (interventions implemented as appropriate)      Problem: Fall Risk (Adult)  Goal: Identify Related Risk Factors and Signs and Symptoms  Outcome: Ongoing (interventions implemented as appropriate)    Goal: Absence of Fall  Outcome: Ongoing (interventions implemented as appropriate)

## 2019-03-15 NOTE — PROGRESS NOTES
Continued Stay Note  [facility]     Patient Name: Joan Almanza  MRN: [id number]  Today's Date: 3/15/2019    Admit Date: 3/4/2019    Discharge Plan     Row Name 03/15/19 0923       Plan    Plan  Home with spouse- following for dc med costs    Patient/Family in Agreement with Plan  yes    Plan Comments  Spoke with pt and spouse at bedside, discussed dc planning and goodrx for medications. Pt states plan is home with spouse. CCP explained following for final dc med list and cost that she can afford. dipak montelongo/ccp        Discharge Codes    No documentation.             Mendy San, RN

## 2019-03-15 NOTE — PROGRESS NOTES
"DAILY PROGRESS NOTE  [facility]    Patient Identification:  Name: Joan Almanza  Age: 64 y.o.  Sex: female  :  1955  MRN: [id number]         Primary Care Physician: Juarez Winters MD      Subjective  No c/o.  Feels well.     Objective:  General Appearance:  Comfortable, well-appearing, in no acute distress and not in pain.    Vital signs: (most recent): Blood pressure 132/92, pulse 79, temperature 98.2 °F (36.8 °C), temperature source Oral, resp. rate 18, height 157.5 cm (62\"), weight 126 kg (278 lb), SpO2 95 %.    Lungs:  Normal effort and normal respiratory rate.  Breath sounds clear to auscultation.    Heart: Normal rate.  Regular rhythm.    Extremities: There is no dependent edema.    Neurological: Patient is alert and oriented to person, place and time.    Skin:  Warm and dry.                Vital signs in last 24 hours:  Temp:  [98.1 °F (36.7 °C)-98.2 °F (36.8 °C)] 98.2 °F (36.8 °C)  Heart Rate:  [] 79  Resp:  [18] 18  BP: (132-147)/(67-92) 132/92    Intake/Output:    Intake/Output Summary (Last 24 hours) at 3/15/2019 1811  Last data filed at 3/15/2019 1348  Gross per 24 hour   Intake 600 ml   Output 20 ml   Net 580 ml         Results from last 7 days   Lab Units 03/15/19  0518 03/14/19  0615 1938 03/10/19  0539 19  0522   WBC 10*3/mm3 25.15* 22.14* 16.96* 15.39* 16.11*   HEMOGLOBIN g/dL 16.9* 16.7* 15.3 14.3 15.2   PLATELETS 10*3/mm3 221 213 232 214 250     Results from last 7 days   Lab Units 03/15/19  0518 03/14/19  0615 19  0904 19  0538 03/10/19  0539 19  0522   SODIUM mmol/L 134* 137 139 137 140 140   POTASSIUM mmol/L 4.1 4.4 4.2 4.3 4.5 4.2   CHLORIDE mmol/L 91* 96* 102 102 107 109*   CO2 mmol/L 26.8 28.9 23.5 21.4* 22.5 22.9   BUN mg/dL 35* 24* 21 18 19 17   CREATININE mg/dL 1.08* 0.92 0.86 0.71 0.72 0.61   GLUCOSE mg/dL 153* 98 134* 127* 126* 110*   Estimated Creatinine Clearance: 66.9 mL/min (A) (by C-G formula based on SCr of 1.08 " mg/dL (H)).  Results from last 7 days   Lab Units 03/15/19  0518 03/14/19  1456 03/14/19  0615 03/13/19  1715 03/13/19  1404 03/13/19  0417 03/12/19  0904 03/11/19  0538   CALCIUM mg/dL 10.1 10.8* 11.5*  11.6* 11.3* 11.2* 11.9* 12.2* 11.6*   ALBUMIN g/dL  --   --   --   --   --   --   --  3.50   PHOSPHORUS mg/dL 3.3  --  2.4*  --   --   --   --  1.8*     Results from last 7 days   Lab Units 03/11/19  0538   ALBUMIN g/dL 3.50       Assessment:  Diplopia - 6th nerve palsy.   Vasogenic edema lt temporal lobe.   Dural AV fistula   Meningioma  Bradycardia  Hyperparathyroidism:  s/p Parathyroidectomy 03/13/19 . Monitor Ca... Very closely.  Vit D, Ca supplement adjustments.   Morbid obesity:   Leukocytosis - probably 2nd to steroids.         Plan:  Much improved.  D/C when OK w Surg.     Edward Peralta MD  3/15/2019  6:11 PM

## 2019-03-15 NOTE — PROGRESS NOTES
"   LOS: 11 days    Patient Care Team:  Juarez Winters MD as PCP - General (Family Medicine)    Chief Complaint:    Chief Complaint   Patient presents with   • Headache   • Eye Pain   • Diplopia     Follow UP Hypercalcemia  Subjective     Interval History:     S:  Mel nd examined. No SOA or CP. S/P surgery. Feels better    Objective     Vital Signs  Temp:  [98 °F (36.7 °C)-98.2 °F (36.8 °C)] 98.1 °F (36.7 °C)  Heart Rate:  [] 71  Resp:  [18] 18  BP: (134-147)/(67-75) 134/67    Flowsheet Rows      First Filed Value   Admission Height  157.5 cm (62\") Documented at 03/04/2019 1050   Admission Weight  122 kg (270 lb) Documented at 03/04/2019 1050          I/O this shift:  In: 360 [P.O.:360]  Out: -   I/O last 3 completed shifts:  In: 1778 [P.O.:960; I.V.:818]  Out: 4245 [Urine:4100; Drains:125]    Intake/Output Summary (Last 24 hours) at 3/15/2019 1009  Last data filed at 3/15/2019 0900  Gross per 24 hour   Intake 360 ml   Output 1595 ml   Net -1235 ml       Physical Exam:  Exam:  GEN:               No distress, appears stated age  EYES:              Anicteric sclera  ENT:                External ears/nose normal, MM are moist  NECK:             No adenopathy, JVP none  LUNGS:           Normal chest on inspection; not labored  CV:                  Normal S1S2, without murmur  ABD:                Non-tender, non-distended, no hepatosplenomegaly, +BS  EXT:                No edema; no cyanosis; clubbing       Results Review:    Results from last 7 days   Lab Units 03/15/19  0518 03/14/19  1456 03/14/19  0615  03/12/19  0904   SODIUM mmol/L 134*  --  137  --  139   POTASSIUM mmol/L 4.1  --  4.4  --  4.2   CHLORIDE mmol/L 91*  --  96*  --  102   CO2 mmol/L 26.8  --  28.9  --  23.5   BUN mg/dL 35*  --  24*  --  21   CREATININE mg/dL 1.08*  --  0.92  --  0.86   CALCIUM mg/dL 10.1 10.8* 11.5*  11.6*   < > 12.2*   GLUCOSE mg/dL 153*  --  98  --  134*    < > = values in this interval not displayed.       Estimated " Creatinine Clearance: 66.9 mL/min (A) (by C-G formula based on SCr of 1.08 mg/dL (H)).    Results from last 7 days   Lab Units 03/15/19  0518 03/14/19  0615 03/11/19  0538   PHOSPHORUS mg/dL 3.3 2.4* 1.8*             Results from last 7 days   Lab Units 03/15/19  0518 03/14/19  0615 03/11/19  0538 03/10/19  0539 03/09/19  0522   WBC 10*3/mm3 25.15* 22.14* 16.96* 15.39* 16.11*   HEMOGLOBIN g/dL 16.9* 16.7* 15.3 14.3 15.2   PLATELETS 10*3/mm3 221 213 232 214 250               Imaging Results (last 24 hours)     ** No results found for the last 24 hours. **          amLODIPine 10 mg Oral Q24H   [START ON 3/16/2019] calcitriol 0.25 mcg Oral Daily   calcium-vitamin D 1,000 mg Oral BID   docusate sodium 100 mg Oral BID   famotidine 20 mg Oral BID   furosemide 20 mg Intravenous Q12H   hydrALAZINE 25 mg Oral Q8H   methylPREDNISolone 4 mg Oral TID Around Food   [START ON 3/16/2019] methylPREDNISolone 4 mg Oral Before Breakfast   [START ON 3/16/2019] methylPREDNISolone 4 mg Oral Tonight   [START ON 3/17/2019] methylPREDNISolone 4 mg Oral Before Breakfast   potassium chloride 80 mEq Oral Daily   sodium chloride 3 mL Intravenous Q12H          Medication Review:   Current Facility-Administered Medications   Medication Dose Route Frequency Provider Last Rate Last Dose   • acetaminophen (TYLENOL) tablet 650 mg  650 mg Oral Q4H PRN Michelle Reynoso MD        Or   • acetaminophen (TYLENOL) 160 MG/5ML solution 650 mg  650 mg Oral Q4H PRN Michelle Reynoso MD        Or   • acetaminophen (TYLENOL) suppository 650 mg  650 mg Rectal Q4H PRN Michelle Reynoso MD       • amLODIPine (NORVASC) tablet 10 mg  10 mg Oral Q24H Carol Ann Johnson APRN   10 mg at 03/15/19 0949   • [START ON 3/16/2019] calcitriol (ROCALTROL) capsule 0.25 mcg  0.25 mcg Oral Daily Ty Lofton MD       • calcium-vitamin D 500-200 MG-UNIT tablet 1,000 mg  1,000 mg Oral BID Ty Lofton MD       • docusate sodium (COLACE) capsule 100 mg  100 mg Oral BID Mateo,  Pranav EDGAR MD   100 mg at 03/12/19 0940   • famotidine (PEPCID) tablet 20 mg  20 mg Oral BID Elisabet Skinner APRN   20 mg at 03/15/19 0949   • furosemide (LASIX) injection 20 mg  20 mg Intravenous Q12H Ty Lofton MD   20 mg at 03/15/19 0949   • hydrALAZINE (APRESOLINE) injection 10 mg  10 mg Intravenous Q6H PRN Carol Ann Johnson APRN       • hydrALAZINE (APRESOLINE) tablet 25 mg  25 mg Oral Q8H So Foley MD   25 mg at 03/15/19 0637   • HYDROcodone-acetaminophen (NORCO)  MG per tablet 1 tablet  1 tablet Oral Q4H PRN Michelle Reynoso MD   1 tablet at 03/13/19 1730   • HYDROcodone-acetaminophen (NORCO) 5-325 MG per tablet 1 tablet  1 tablet Oral Q4H PRN Michelle Reynoso MD   1 tablet at 03/15/19 0639   • HYDROmorphone (DILAUDID) injection 0.5 mg  0.5 mg Intravenous Q2H PRN Michelle Reynoso MD        And   • naloxone (NARCAN) injection 0.1 mg  0.1 mg Intravenous Q5 Min PRN Michelle Reynoso MD       • methylPREDNISolone (MEDROL (ANEESH)) tablet 4 mg  4 mg Oral TID Around Food Bird, Lucero E., APRN   4 mg at 03/15/19 0637   • [START ON 3/16/2019] methylPREDNISolone (MEDROL (ANEESH)) tablet 4 mg  4 mg Oral Before Breakfast Bird, Lucero E., APRN       • [START ON 3/16/2019] methylPREDNISolone (MEDROL (ANEESH)) tablet 4 mg  4 mg Oral Tonight Bird, Lucero E., APRN       • [START ON 3/17/2019] methylPREDNISolone (MEDROL (ANEESH)) tablet 4 mg  4 mg Oral Before Breakfast Alex, Lucero E., APRN       • morphine injection 4 mg  4 mg Intravenous Q2H PRN Michelle Reynoso MD        And   • naloxone (NARCAN) injection 0.4 mg  0.4 mg Intravenous Q5 Min PRN Michelle Reynoso MD       • nitroglycerin (NITROSTAT) SL tablet 0.4 mg  0.4 mg Sublingual Q5 Min PRN Alice Simmons MD       • ondansetron (ZOFRAN) injection 4 mg  4 mg Intravenous Q6H PRN Alice Simmons MD   4 mg at 03/11/19 0639   • potassium chloride (MICRO-K) CR capsule 80 mEq  80 mEq Oral Daily Ty Lofton MD   80 mEq at 03/15/19 0949   • sodium chloride 0.9 % flush 10  mL  10 mL Intravenous PRN Bull Branch II, MD       • sodium chloride 0.9 % flush 3 mL  3 mL Intravenous Q12H Alice Simmons MD   3 mL at 03/15/19 0950   • sodium chloride 0.9 % flush 3-10 mL  3-10 mL Intravenous PRN Alice Simmons MD           Assessment/Plan         Diplopia    Cerebral edema (CMS/HCC)    Meningioma (CMS/HCC)    AVF (arteriovenous fistula) (CMS/HCC)    6th nerve palsy, left    Hypercalcemia    Hyperparathyroidism (CMS/HCC)    Morbid obesity (CMS/HCC)    1. Hypercalcemia:  thiazide, hyperparathyroidism all contributing etiologies     2. Hypertension- Hold Thiazide.  Uncontrolled.  Bradycardia noted  3. Intracranial meningioma with cerebral edema and 6th nerve palsy  4.  Severe obesity:  BMI 52     Plan:     S/P left inferior and right superior adenoma resection.  I am in favor of increasing oral calcium and less vit D as that likely will lead gto less hypercalcemia  Surveillance       Ty Lofton MD  03/15/19  10:09 AM

## 2019-03-15 NOTE — PLAN OF CARE
Problem: Patient Care Overview  Goal: Plan of Care Review  Outcome: Ongoing (interventions implemented as appropriate)   03/15/19 5795   Coping/Psychosocial   Plan of Care Reviewed With patient   OTHER   Outcome Summary vss, no falls, c/o pain, prn norco, c/o nausea today, prn zofran, dc JANAY, monitor labs, continue to monitor   Plan of Care Review   Progress improving       Problem: Skin Injury Risk (Adult)  Goal: Skin Health and Integrity  Outcome: Ongoing (interventions implemented as appropriate)      Problem: Pain, Acute (Adult)  Goal: Acceptable Pain Control/Comfort Level  Outcome: Ongoing (interventions implemented as appropriate)      Problem: Fall Risk (Adult)  Goal: Absence of Fall  Outcome: Ongoing (interventions implemented as appropriate)

## 2019-03-16 LAB
BASOPHILS # BLD AUTO: 0.09 10*3/MM3 (ref 0–0.2)
BASOPHILS NFR BLD AUTO: 0.3 % (ref 0–1.5)
CA-I BLD-MCNC: 5.3 MG/DL (ref 4.6–5.4)
CA-I SERPL ISE-MCNC: 1.33 MMOL/L (ref 1.15–1.35)
CALCIUM SPEC-SCNC: 10.1 MG/DL (ref 8.6–10.5)
DEPRECATED RDW RBC AUTO: 43.5 FL (ref 37–54)
EOSINOPHIL # BLD AUTO: 0.1 10*3/MM3 (ref 0–0.4)
EOSINOPHIL NFR BLD AUTO: 0.3 % (ref 0.3–6.2)
ERYTHROCYTE [DISTWIDTH] IN BLOOD BY AUTOMATED COUNT: 13.6 % (ref 12.3–15.4)
HCT VFR BLD AUTO: 49.5 % (ref 34–46.6)
HGB BLD-MCNC: 16.2 G/DL (ref 12–15.9)
IMM GRANULOCYTES # BLD AUTO: 0.6 10*3/MM3 (ref 0–0.05)
IMM GRANULOCYTES NFR BLD AUTO: 2 % (ref 0–0.5)
LYMPHOCYTES # BLD AUTO: 2.15 10*3/MM3 (ref 0.7–3.1)
LYMPHOCYTES NFR BLD AUTO: 7.3 % (ref 19.6–45.3)
MCH RBC QN AUTO: 28.6 PG (ref 26.6–33)
MCHC RBC AUTO-ENTMCNC: 32.7 G/DL (ref 31.5–35.7)
MCV RBC AUTO: 87.3 FL (ref 79–97)
MONOCYTES # BLD AUTO: 2.65 10*3/MM3 (ref 0.1–0.9)
MONOCYTES NFR BLD AUTO: 9 % (ref 5–12)
NEUTROPHILS # BLD AUTO: 23.94 10*3/MM3 (ref 1.4–7)
NEUTROPHILS NFR BLD AUTO: 81.1 % (ref 42.7–76)
NRBC BLD AUTO-RTO: 0 /100 WBC (ref 0–0)
PLATELET # BLD AUTO: 202 10*3/MM3 (ref 140–450)
PMV BLD AUTO: 10.7 FL (ref 6–12)
PTH-INTACT SERPL-MCNC: 5.7 PG/ML (ref 15–65)
RBC # BLD AUTO: 5.67 10*6/MM3 (ref 3.77–5.28)
WBC NRBC COR # BLD: 29.53 10*3/MM3 (ref 3.4–10.8)

## 2019-03-16 PROCEDURE — 82330 ASSAY OF CALCIUM: CPT | Performed by: HOSPITALIST

## 2019-03-16 PROCEDURE — 85025 COMPLETE CBC W/AUTO DIFF WBC: CPT | Performed by: HOSPITALIST

## 2019-03-16 PROCEDURE — 25010000002 ONDANSETRON PER 1 MG: Performed by: INTERNAL MEDICINE

## 2019-03-16 PROCEDURE — 97162 PT EVAL MOD COMPLEX 30 MIN: CPT

## 2019-03-16 PROCEDURE — 83970 ASSAY OF PARATHORMONE: CPT | Performed by: SURGERY

## 2019-03-16 PROCEDURE — 82310 ASSAY OF CALCIUM: CPT | Performed by: SURGERY

## 2019-03-16 PROCEDURE — 97110 THERAPEUTIC EXERCISES: CPT

## 2019-03-16 PROCEDURE — 63710000001 METHYLPREDNISOLONE 4 MG TABLET THERAPY PACK 21 EACH DISP PACK: Performed by: NURSE PRACTITIONER

## 2019-03-16 PROCEDURE — 99024 POSTOP FOLLOW-UP VISIT: CPT | Performed by: SURGERY

## 2019-03-16 PROCEDURE — 25010000002 FUROSEMIDE PER 20 MG: Performed by: INTERNAL MEDICINE

## 2019-03-16 PROCEDURE — 99233 SBSQ HOSP IP/OBS HIGH 50: CPT | Performed by: INTERNAL MEDICINE

## 2019-03-16 RX ORDER — SENNA AND DOCUSATE SODIUM 50; 8.6 MG/1; MG/1
2 TABLET, FILM COATED ORAL NIGHTLY
Status: DISCONTINUED | OUTPATIENT
Start: 2019-03-16 | End: 2019-03-20 | Stop reason: HOSPADM

## 2019-03-16 RX ORDER — BISACODYL 10 MG
10 SUPPOSITORY, RECTAL RECTAL DAILY PRN
Status: DISCONTINUED | OUTPATIENT
Start: 2019-03-16 | End: 2019-03-20 | Stop reason: HOSPADM

## 2019-03-16 RX ADMIN — POLYETHYLENE GLYCOL 3350 17 G: 17 POWDER, FOR SOLUTION ORAL at 12:27

## 2019-03-16 RX ADMIN — FAMOTIDINE 20 MG: 20 TABLET, FILM COATED ORAL at 08:49

## 2019-03-16 RX ADMIN — SENNOSIDES AND DOCUSATE SODIUM 2 TABLET: 8.6; 5 TABLET ORAL at 01:49

## 2019-03-16 RX ADMIN — FUROSEMIDE 20 MG: 10 INJECTION, SOLUTION INTRAMUSCULAR; INTRAVENOUS at 08:49

## 2019-03-16 RX ADMIN — METHYLPREDNISOLONE 4 MG: 4 TABLET ORAL at 21:41

## 2019-03-16 RX ADMIN — CALCIUM CARBONATE-VITAMIN D TAB 500 MG-200 UNIT 1000 MG: 500-200 TAB at 08:48

## 2019-03-16 RX ADMIN — Medication 10 MG: at 12:27

## 2019-03-16 RX ADMIN — METOPROLOL TARTRATE 25 MG: 25 TABLET ORAL at 21:39

## 2019-03-16 RX ADMIN — SENNOSIDES AND DOCUSATE SODIUM 2 TABLET: 8.6; 5 TABLET ORAL at 21:39

## 2019-03-16 RX ADMIN — POTASSIUM CHLORIDE 80 MEQ: 750 CAPSULE, EXTENDED RELEASE ORAL at 08:49

## 2019-03-16 RX ADMIN — SODIUM CHLORIDE, PRESERVATIVE FREE 3 ML: 5 INJECTION INTRAVENOUS at 08:49

## 2019-03-16 RX ADMIN — METHYLPREDNISOLONE 4 MG: 4 TABLET ORAL at 06:39

## 2019-03-16 RX ADMIN — SODIUM CHLORIDE 5 MG/HR: 900 INJECTION, SOLUTION INTRAVENOUS at 00:42

## 2019-03-16 RX ADMIN — SODIUM CHLORIDE, PRESERVATIVE FREE 3 ML: 5 INJECTION INTRAVENOUS at 21:39

## 2019-03-16 RX ADMIN — CALCITRIOL CAPSULES 0.25 MCG 0.25 MCG: 0.25 CAPSULE ORAL at 08:49

## 2019-03-16 RX ADMIN — FAMOTIDINE 20 MG: 20 TABLET, FILM COATED ORAL at 21:41

## 2019-03-16 RX ADMIN — FUROSEMIDE 20 MG: 10 INJECTION, SOLUTION INTRAMUSCULAR; INTRAVENOUS at 21:39

## 2019-03-16 RX ADMIN — SODIUM CHLORIDE 5 MG/HR: 900 INJECTION, SOLUTION INTRAVENOUS at 06:39

## 2019-03-16 RX ADMIN — AMLODIPINE BESYLATE 10 MG: 10 TABLET ORAL at 08:49

## 2019-03-16 RX ADMIN — HYDRALAZINE HYDROCHLORIDE 25 MG: 25 TABLET ORAL at 13:59

## 2019-03-16 RX ADMIN — ONDANSETRON HYDROCHLORIDE 4 MG: 2 SOLUTION INTRAMUSCULAR; INTRAVENOUS at 14:04

## 2019-03-16 RX ADMIN — HYDRALAZINE HYDROCHLORIDE 25 MG: 25 TABLET ORAL at 21:39

## 2019-03-16 RX ADMIN — HYDRALAZINE HYDROCHLORIDE 25 MG: 25 TABLET ORAL at 06:39

## 2019-03-16 NOTE — PROGRESS NOTES
[facility]  Progress note: 3/16/2019    Patient Identification:  Name:Joan Almanza  Age:64 y.o.  Sex: female  :  1955  MRN: [id number]           CC:  Asked to see again for atrial fibrillation    Interval history:  64-year-old female admitted with diplopia and found to have a intracranial meningioma and dural AV fistula.  Also noted to be hypertensive with bradycardia on admission on .  Her bradycardia was felt to be asymptomatic.  Metoprolol was discontinued and she was placed on amlodipine for blood pressure control.  Also noted to be hypercalcemic and diagnosed with hyperparathyroidism as well as thiazides contributing to elevated calcium felt to parathyroid adenoma which was resected on 2019 with subsequent labile calcium level.  At 11 PM yesterday she  went into atrial fibrillation with rapid ventricular rate.  IV Cardizem drip initiated and HR with heart rate in the 90s though still in atrial fibrillation and with ambulation heart rate increases to 140s very quickly.  She denies any palpitations chest pain or shortness of breath.     Vital Signs:   Temp:  [97.6 °F (36.4 °C)-98.4 °F (36.9 °C)] 97.6 °F (36.4 °C)  Heart Rate:  [] 90  Resp:  [16-18] 18  BP: (105-146)/(63-97) 134/81    Intake/Output Summary (Last 24 hours) at 3/16/2019 1505  Last data filed at 3/16/2019 0639  Gross per 24 hour   Intake 262 ml   Output 35 ml   Net 227 ml     Physical Examination:    General Appearance No acute distress   Neck No adenopathy, supple, trachea midline, no thyromegaly, no carotid bruit, no JVD   Lungs Clear to auscultation,respirations regular, even and unlabored   Heart Irregular rhythm and normal rate, normal S1 and S2, no murmur, no gallop, no rub, no click   Chest wall No abnormalities observed   Abdomen Normal bowel sounds, no masses, no hepatomegaly, soft   Extremities Moves all extremities well, no edema, no cyanosis, no redness   Neurological Alert and oriented x 3      Lab Review:  Personally reviewed the labs, radiology imaging and other cardiac procedures. Results from last 7 days   Lab Units 03/16/19  0407  03/15/19  0518   SODIUM mmol/L  --   --  134*   POTASSIUM mmol/L  --   --  4.1   CHLORIDE mmol/L  --   --  91*   CO2 mmol/L  --   --  26.8   BUN mg/dL  --   --  35*   CREATININE mg/dL  --   --  1.08*   CALCIUM mg/dL 10.1   < > 10.1   GLUCOSE mg/dL  --   --  153*    < > = values in this interval not displayed.     Results from last 7 days   Lab Units 03/16/19  0407 03/15/19  0518 03/14/19  0615   WBC 10*3/mm3 29.53* 25.15* 22.14*   HEMOGLOBIN g/dL 16.2* 16.9* 16.7*   HEMATOCRIT % 49.5* 51.7* 50.4*   PLATELETS 10*3/mm3 202 221 213     Medication Review:   Meds reviewed  Scheduled Meds:  amLODIPine 10 mg Oral Q24H   calcitriol 0.25 mcg Oral Daily   [START ON 3/17/2019] calcium-vitamin D 1,000 mg Oral Daily   famotidine 20 mg Oral BID   furosemide 20 mg Intravenous Q12H   hydrALAZINE 25 mg Oral Q8H   methylPREDNISolone 4 mg Oral Tonight   [START ON 3/17/2019] methylPREDNISolone 4 mg Oral Before Breakfast   polyethylene glycol 17 g Oral Daily   potassium chloride 80 mEq Oral Daily   sennosides-docusate sodium 2 tablet Oral Nightly   sodium chloride 3 mL Intravenous Q12H     Continuous Infusions:  diltiaZEM 5-15 mg/hr Last Rate: 5 mg/hr (03/16/19 0639)     I personally viewed and interpreted the patient's EKG/Telemetry data    Assessment and Plan  1.  Paroxysmal atrial fibrillation with rapid ventricular rate.  Will discontinue IV Cardizem drip and start metoprolol.  We will also decrease amlodipine dose.  Will need to watch for bradycardia which is noted earlier in her admission.  We will also need to render opinion from Dr. Reynoso and neurologist to use of anticoagulant therapy as her chads Vasc score  is elevated (2).  Also obtain an echocardiogram  2.  History of asymptomatic sinus bradycardia.  We will need to watch with the resumption of AV chaz blocker therapy  3.   History of hypercalcemia and hyperparathyroidism, status parathyroidectomy  4.  Hypertension  5.  Meningioma  6.  Cranial AV fistula    Mini Baeza  3/16/56635:05 PM  35min spent in reviewing records, discussion and examination of the patient and discussion with other members of the patient's medical team.     Dictated utilizing Dragon dictation

## 2019-03-16 NOTE — PROGRESS NOTES
SURGERY: CHAITANYA  Post op Visit  Joan Archie  03/16/19    Put in order today for  to make sure that patient could get calcitriol at discharge.  She is ready from a parathyroid standpoint, if she can get that and can get and intact PTH and calcium level at the hospital 4 days after discharge.  Defer WBC issue to medicine.    i'll see back in 2 weeks, but will call with medication adjustment instructions.    Michelle Reynoso MD    03/16/19

## 2019-03-16 NOTE — PLAN OF CARE
Problem: Patient Care Overview  Goal: Plan of Care Review  Outcome: Ongoing (interventions implemented as appropriate)   03/16/19 1996   Coping/Psychosocial   Plan of Care Reviewed With patient   OTHER   Outcome Summary Pt admitted for double vision and difficulty walking. This visit she walked around the RN station and tolerated with mild complaints of dizziness/double vision and given CGA-Marco throughout functional mobility. PLOF is use of cane as AD, lives with . PT d/c recommendation is home with assist/home health.

## 2019-03-16 NOTE — PLAN OF CARE
Problem: Patient Care Overview  Goal: Plan of Care Review  Outcome: Ongoing (interventions implemented as appropriate)   03/16/19 0601   Coping/Psychosocial   Plan of Care Reviewed With patient   OTHER   Outcome Summary WBC cont to rise. JANAY drain removed with ease per MD order. Pt had rhythm change to a-fib RVR. Dr Simmons notified; orders noted. Cardizem drip initiated and d/c'd d/t good response. Awaiting cardiology consult.    Plan of Care Review   Progress no change     Goal: Individualization and Mutuality  Outcome: Ongoing (interventions implemented as appropriate)      Problem: Skin Injury Risk (Adult)  Goal: Skin Health and Integrity  Outcome: Ongoing (interventions implemented as appropriate)      Problem: Pain, Acute (Adult)  Goal: Acceptable Pain Control/Comfort Level  Outcome: Ongoing (interventions implemented as appropriate)      Problem: Fall Risk (Adult)  Goal: Identify Related Risk Factors and Signs and Symptoms  Outcome: Ongoing (interventions implemented as appropriate)    Goal: Absence of Fall  Outcome: Ongoing (interventions implemented as appropriate)

## 2019-03-16 NOTE — PROGRESS NOTES
Name: Joan Almanza ADMIT: 3/4/2019   : 1955  PCP: Juarez Winters MD    MRN: [id number] LOS: 12 days   AGE/SEX: 64 y.o. female  ROOM: [facility]   Subjective   No CP SOA NVD. Reports had bowel movement but still with feeling of constipation. Diplopia unchanged from admission.    Objective   Vital Signs  Temp:  [98.2 °F (36.8 °C)-98.4 °F (36.9 °C)] 98.4 °F (36.9 °C)  Heart Rate:  [] 86  Resp:  [16-18] 18  BP: (105-146)/(63-97) 124/76  SpO2:  [90 %-97 %] 95 %  on   ;   Device (Oxygen Therapy): room air  Body mass index is 50.36 kg/m².    Physical Exam   Constitutional: She is oriented to person, place, and time. She appears well-developed. No distress.   HENT:   Head: Atraumatic.   Nose: Nose normal.   Eyes: Conjunctivae are normal. Pupils are equal, round, and reactive to light.   Neck: Normal range of motion. Neck supple.   Incision cdi   Cardiovascular: Normal rate and intact distal pulses. An irregularly irregular rhythm present.   Pulmonary/Chest: Effort normal. She has no wheezes. She has no rales.   Abdominal: Soft. She exhibits no distension. There is no tenderness. There is no rebound.   Musculoskeletal: Normal range of motion. She exhibits no tenderness.   Neurological: She is alert and oriented to person, place, and time.   Skin: Skin is warm and dry. She is not diaphoretic.   Psychiatric: She has a normal mood and affect. Her behavior is normal.   Nursing note and vitals reviewed.      Results Review:       I reviewed the patient's new clinical results.     I reviewed imaging, agree with interpretation.     I reviewed telemetry/EKG results, afib, rate ok.     Results from last 7 days   Lab Units 19  0407 03/15/19  0518 19  0615 19  0538   WBC 10*3/mm3 29.53* 25.15* 22.14* 16.96*   HEMOGLOBIN g/dL 16.2* 16.9* 16.7* 15.3   PLATELETS 10*3/mm3 202 221 213 232     Results from last 7 days   Lab Units 03/15/19  0518 19  0615 19  0904 19  0538   SODIUM mmol/L  134* 137 139 137   POTASSIUM mmol/L 4.1 4.4 4.2 4.3   CHLORIDE mmol/L 91* 96* 102 102   CO2 mmol/L 26.8 28.9 23.5 21.4*   BUN mg/dL 35* 24* 21 18   CREATININE mg/dL 1.08* 0.92 0.86 0.71   GLUCOSE mg/dL 153* 98 134* 127*   Estimated Creatinine Clearance: 66.5 mL/min (A) (by C-G formula based on SCr of 1.08 mg/dL (H)).  Results from last 7 days   Lab Units 03/16/19  0407 03/15/19  1851 03/15/19  0518 03/14/19  1456 03/14/19  0615  03/11/19  0538   CALCIUM mg/dL 10.1 10.3 10.1 10.8* 11.5*  11.6*   < > 11.6*   ALBUMIN g/dL  --   --   --   --   --   --  3.50   PHOSPHORUS mg/dL  --   --  3.3  --  2.4*  --  1.8*    < > = values in this interval not displayed.         amLODIPine 10 mg Oral Q24H   calcitriol 0.25 mcg Oral Daily   calcium-vitamin D 1,000 mg Oral BID   famotidine 20 mg Oral BID   furosemide 20 mg Intravenous Q12H   hydrALAZINE 25 mg Oral Q8H   methylPREDNISolone 4 mg Oral Tonight   [START ON 3/17/2019] methylPREDNISolone 4 mg Oral Before Breakfast   polyethylene glycol 17 g Oral Daily   potassium chloride 80 mEq Oral Daily   sennosides-docusate sodium 2 tablet Oral Nightly   sodium chloride 3 mL Intravenous Q12H       diltiaZEM 5-15 mg/hr Last Rate: 5 mg/hr (03/16/19 0639)   Diet Regular      Assessment/Plan      Active Hospital Problems    Diagnosis  POA   • **Diplopia [H53.2]  Yes   • Morbid obesity (CMS/HCC) [E66.01]  Yes   • Hypercalcemia [E83.52]  Unknown   • Hyperparathyroidism (CMS/HCC) [E21.3]  Yes   • Cerebral edema (CMS/HCC) [G93.6]  Yes   • Meningioma (CMS/HCC) [D32.9]  Yes   • AVF (arteriovenous fistula) (CMS/HCC) [I77.0]  Yes   • 6th nerve palsy, left [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     - 6th Nerve Palsy: Diplopia unchanged. Steroid. [facility] follow up.  - AV Malformation/Cavernous Sinus Tumor: See above  - Hyperparathyroidism: sp right superior/left inferior parathyroid resections, right inferior/left superior parathyroid biopsy, right thyroid nodule  resection 03/13. Surgery following.  - Hypercalcemia: On Cacitriol. Nephrology following.  - AFib: On diltiazem gtt. Cardiology following.  - Leukocytosis: No infectious signs. 2/2 steroid. Monitor.  - Disposition: Home/possibly tomorrow if calcitriol can be obtained. With her persistent diplopia, will ask PT to eval.    >35 minutes time spent    Juancho Yoder MD  [facility]  03/16/19  11:33 AM

## 2019-03-16 NOTE — THERAPY EVALUATION
Acute Care - Physical Therapy Initial Evaluation  [facility]     Patient Name: Joan Almanza  : 1955  MRN: [id number]  Today's Date: 3/16/2019         Referring Physician: Paresh      Admit Date: 3/4/2019    Visit Dx:     ICD-10-CM ICD-9-CM   1. Cerebral edema (CMS/HCC) G93.6 348.5   2. Meningioma (CMS/HCC) D32.9 225.2   3. Lateral rectus palsy, left H49.22 378.54   4. 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 Hypercalcemia E83.52 275.42   6. Hyperparathyroidism (CMS/HCC) E21.3 252.00   7. AVF (arteriovenous fistula) (CMS/HCC) I77.0 447.0   8. Difficulty walking R26.2 719.7     Patient Active Problem List   Diagnosis   • Cerebral edema (CMS/HCC)   • Meningioma (CMS/HCC)   • AVF (arteriovenous fistula) (CMS/HCC)   • Diplopia   • 6th nerve palsy, left   • Hypercalcemia   • Hyperparathyroidism (CMS/HCC)   • Morbid obesity (CMS/HCC)     Past Medical History:   Diagnosis Date   • Morbid obesity (CMS/HCC)    • Ovarian cyst    • Tachycardia      Past Surgical History:   Procedure Laterality Date   • ADENOIDECTOMY     • CHOLECYSTECTOMY     • OVARY SURGERY Right    • TONSILLECTOMY          PT ASSESSMENT (last 12 hours)      Physical Therapy Evaluation     Row Name 19 1559          PT Evaluation Time/Intention    Subjective Information  no complaints  -CS     Document Type  evaluation  -CS     Mode of Treatment  physical therapy  -CS     Patient Effort  good  -CS     Row Name 19 1559          General Information    Patient Profile Reviewed?  yes  -CS     Referring Physician  Paresh  -CS     Patient Observations  alert;cooperative;agree to therapy  -CS     Patient/Family Observations  Pt up in chair no acute distress  -CS     Prior Level of Function  independent:  -CS     Equipment Currently Used at Home  cane, straight  -CS     Pertinent History of Current Functional Problem  double vision  -CS     Existing Precautions/Restrictions  fall  -CS     Equipment Issued to Patient  walker, front  wheeled;gait belt  -CS     Row Name 03/16/19 1559          Relationship/Environment    Primary Source of Support/Comfort  spouse  -CS     Lives With  spouse  -CS     Family Caregiver if Needed  spouse  -CS     Row Name 03/16/19 1559          Resource/Environmental Concerns    Current Living Arrangements  home/apartment/condo  -CS     Resource/Environmental Concerns  none  -CS     Row Name 03/16/19 1559          Home Main Entrance    Number of Stairs, Main Entrance  two  -CS     Row Name 03/16/19 1559          Cognitive Assessment/Intervention- PT/OT    Orientation Status (Cognition)  oriented x 3  -CS     Follows Commands (Cognition)  WNL  -CS     Safety Deficit (Cognitive)  insight into deficits/self awareness  -CS     Row Name 03/16/19 1559          Safety Issues, Functional Mobility    Safety Issues Affecting Function (Mobility)  insight into deficits/self awareness  -CS     Row Name 03/16/19 1559          Bed Mobility Assessment/Treatment    Bed Mobility Assessment/Treatment  --  -CS     Row Name 03/16/19 1559          Transfer Assessment/Treatment    Transfer Assessment/Treatment  sit-stand transfer;stand-sit transfer  -CS     Sit-Stand [place] (Transfers)  contact guard  -CS     Stand-Sit [place] (Transfers)  contact guard  -CS     Row Name 03/16/19 1559          Sit-Stand Transfer    Assistive Device (Sit-Stand Transfers)  walker, front-wheeled  -CS     Row Name 03/16/19 1559          Stand-Sit Transfer    Assistive Device (Stand-Sit Transfers)  walker, front-wheeled  -CS     Row Name 03/16/19 1559          Gait/Stairs Assessment/Training    Gait/Stairs Assessment/Training  gait/ambulation assistive device  -CS     [place] Level (Gait)  contact guard  -CS     Assistive Device (Gait)  walker, front-wheeled  -CS     Distance in Feet (Gait)  150  -CS     Pattern (Gait)  step-through  -CS     Deviations/Abnormal Patterns (Gait)  micheal decreased;gait speed decreased;stride length decreased  -CS      Bilateral Gait Deviations  forward flexed posture  -CS     Row Name 03/16/19 1559          General ROM    GENERAL ROM COMMENTS  WNL  -CS     Row Name 03/16/19 3227          MMT (Manual Muscle Testing)    General MMT Comments  >3/5  -     Row Name 03/16/19 1559          Pain Assessment    Additional Documentation  Pain Scale: Numbers Pre/Post-Treatment (Group)  -     Row Name 03/16/19 1559          Pain Scale: Numbers Pre/Post-Treatment    Pain Scale: Numbers, Pretreatment  0/10 - no pain  -CS     Pain Scale: Numbers, Post-Treatment  0/10 - no pain  -CS     Row Name             Wound 03/13/19 1239 Right neck incision    Wound - Properties Group Date first assessed: 03/13/19  -SL Time first assessed: 1239  -SL Side: Right  -SL Location: neck  -SL Type: incision  -SL    Row Name 03/16/19 1559          Coping    Observed Emotional State  calm;cooperative;pleasant  -CS     Verbalized Emotional State  acceptance  -CS     Row Name 03/16/19 1559          Plan of Care Review    Plan of Care Reviewed With  patient  -[facility] Name 03/16/19 1559          Physical Therapy Clinical Impression    Patient/Family Goals Statement (PT Clinical Impression)  Wants to go home  -CS     Criteria for Skilled Interventions Met (PT Clinical Impression)  yes  -CS     Rehab Potential (PT Clinical Summary)  good, to achieve stated therapy goals  -CS     Care Plan Review (PT)  evaluation/treatment results reviewed  -     Row Name 03/16/19 1554          Physical Therapy Goals    Bed Mobility Goal Selection (PT)  bed mobility, PT goal 1  -CS     Transfer Goal Selection (PT)  transfer, PT goal 1  -CS     Gait Training Goal Selection (PT)  gait training, PT goal 1  -[facility] Name 03/16/19 1558          Transfer Goal 1 (PT)    Activity/Assistive Device (Transfer Goal 1, PT)  sit-to-stand/stand-to-sit  -CS     [place] Level/Cues Needed (Transfer Goal 1, PT)  conditional independence  -CS     Time Frame (Transfer Goal 1, PT)  1 week   -CS     Row Name 03/16/19 1559          Gait Training Goal 1 (PT)    [place] Level (Gait Training Goal 1, PT)  conditional independence  -CS     Distance (Gait Goal 1, PT)  300  -CS     Time Frame (Gait Training Goal 1, PT)  1 week  -CS     Row Name 03/16/19 1559          Positioning and Restraints    Pre-Treatment Position  sitting in chair/recliner  -CS     Post Treatment Position  chair  -CS     In Chair  reclined;call light within reach;encouraged to call for assist  -CS     Row Name 03/16/19 1559          Living Environment    Home Accessibility  stairs to enter home  -CS       User Key  (r) = Recorded By, (t) = Taken By, (c) = Cosigned By    Initials Name Provider Type    Mikaela Joel, RN Registered Nurse     Caleb Rogel, PT Physical Therapist        Physical Therapy Education     Title: PT OT SLP Therapies (Done)     Topic: Physical Therapy (Done)     Point: Mobility training (Done)     Learning Progress Summary           Patient Acceptance, E,TB, VU by  at 3/16/2019  4:14 PM                   Point: Home exercise program (Done)     Learning Progress Summary           Patient Acceptance, E,TB, VU by  at 3/16/2019  4:14 PM                   Point: Body mechanics (Done)     Learning Progress Summary           Patient Acceptance, E,TB, VU by  at 3/16/2019  4:14 PM                   Point: Precautions (Done)     Learning Progress Summary           Patient Acceptance, E,TB, VU by  at 3/16/2019  4:14 PM                               User Key     Initials Effective Dates Name Provider Type Discipline     05/14/18 -  Caleb Rogel, PT Physical Therapist PT              PT Recommendation and Plan  Anticipated Discharge Disposition (PT): home with home health, home with assist  Planned Therapy Interventions (PT Eval): balance training, bed mobility training, gait training, home exercise program, transfer training  Therapy Frequency (PT Clinical Impression): daily  Outcome  Summary/Treatment Plan (PT)  Anticipated Discharge Disposition (PT): home with home health, home with assist  Plan of Care Reviewed With: patient  Outcome Summary: Pt admitted for double vision and difficulty walking. This visit she walked around the RN station and tolerated with mild complaints of dizziness/double vision and given CGA-Marco throughout functional mobility. PLOF is use of cane as AD, lives with . PT d/c recommendation is home with assist/home health.    Outcome Measures     Row Name 03/16/19 1600             How much help from another person do you currently need...    Turning from your back to your side while in flat bed without using bedrails?  3  -CS      Moving from lying on back to sitting on the side of a flat bed without bedrails?  3  -CS      Moving to and from a bed to a chair (including a wheelchair)?  3  -CS      Standing up from a chair using your arms (e.g., wheelchair, bedside chair)?  3  -CS      Climbing 3-5 steps with a railing?  3  -CS      To walk in hospital room?  3  -CS      AM-PAC 6 Clicks Score  18  -CS         Functional Assessment    Outcome Measure Options  AM-PAC 6 Clicks Basic Mobility (PT)  -CS        User Key  (r) = Recorded By, (t) = Taken By, (c) = Cosigned By    Initials Name Provider Type    Caleb Alexandra, PT Physical Therapist         Time Calculation:   PT Charges     Row Name 03/16/19 1617             Time Calculation    Start Time  1558  -CS      Stop Time  1610  -CS      Time Calculation (min)  12 min  -CS      PT Received On  03/16/19  -CS      PT - Next Appointment  03/17/19  -      PT Goal Re-Cert Due Date  03/23/19  -        User Key  (r) = Recorded By, (t) = Taken By, (c) = Cosigned By    Initials Name Provider Type    Caleb Alexandra, PT Physical Therapist        Therapy Suggested Charges     Code   Minutes Charges    None           Therapy Charges for Today     Code Description Service Date Service Provider Modifiers Qty     84782494928  PT THER PROC EA 15 MIN 3/16/2019 Caleb Rogel, PT GP 1    40550730625 HC PT THER SUPP EA 15 MIN 3/16/2019 Caleb Rogel, PT GP 1    78983199214 HC PT EVAL MOD COMPLEXITY 1 3/16/2019 Caleb Rogel, PT GP 1          PT G-Codes  Outcome Measure Options: AM-PAC 6 Clicks Basic Mobility (PT)  AM-PAC 6 Clicks Score: 18      Caleb Rogel PT  3/16/2019

## 2019-03-16 NOTE — PROGRESS NOTES
GENERAL SURGERY  Joan Almanza  1955  2 Day Post-Op    Calcium coming down slowly.  With calcium still hanging in the elevated region, will decrease caclitriol.    Vitals:    03/15/19 1047 03/15/19 1900 03/15/19 1924 03/15/19 2100   BP: 132/92  142/74    BP Location: Left arm  Left arm    Patient Position: Sitting  Sitting    Pulse: 79 73 78 76   Resp: 18  16    Temp: 98.2 °F (36.8 °C)  98.2 °F (36.8 °C)    TempSrc: Oral  Oral    SpO2: 95% 94% 95% 96%   Weight:       Height:         Intake & Output (last day)       03/15 0701 - 03/16 0700    P.O. 600    Total Intake(mL/kg) 600 (4.8)    Urine (mL/kg/hr) 0 (0)    Drains 20    Wound     Total Output 20    Net +580         Urine Unmeasured Occurrence 1 x          Physical Exam    Pertinent labs/imaging:  Results from last 7 days   Lab Units 03/15/19  0518   WBC 10*3/mm3 25.15*   HEMOGLOBIN g/dL 16.9*   HEMATOCRIT % 51.7*   PLATELETS 10*3/mm3 221     Results from last 7 days   Lab Units 03/15/19  1851 03/15/19  0518 03/14/19  1456 03/14/19  0615  03/12/19  0904   SODIUM mmol/L  --  134*  --  137  --  139   POTASSIUM mmol/L  --  4.1  --  4.4  --  4.2   CHLORIDE mmol/L  --  91*  --  96*  --  102   CO2 mmol/L  --  26.8  --  28.9  --  23.5   BUN mg/dL  --  35*  --  24*  --  21   CREATININE mg/dL  --  1.08*  --  0.92  --  0.86   CALCIUM mg/dL 10.3 10.1 10.8* 11.5*  11.6*   < > 12.2*   GLUCOSE mg/dL  --  153*  --  98  --  134*    < > = values in this interval not displayed.       Assessment:   · 2 Day Post-Op S/P 2 parathyroid resection, 2 biopsy.  Despite PTH going to 55 yesterday, calcium going up.  PTH 3.9, extremely low.  · Hypercalcemia  · meningioma 2.8 cm, with additional 6 mm meningioma.  · Diplopia   · Cerebral edema, on steroids  · Body mass index is 52.1 kg/m².   · SOB when recumbent likely related to abdominal pressure on the diaphragm.    Plan:  · Decrease calcitriol.  With a PTH <10, i think she needs some.  Already done by [facility].  · Continue oscal  D  · Would be careful about discharge with calcium level still questionable and PTH dramatically low..  Likely will need to stay until Sunday.  · If still on calcitriol at discharge, must have assurance that she will get that at discharge    Michelle Reynoso MD  03/15/19  12:23 PM

## 2019-03-17 ENCOUNTER — APPOINTMENT (OUTPATIENT)
Dept: CARDIOLOGY | Facility: HOSPITAL | Age: 64
End: 2019-03-17

## 2019-03-17 LAB
ALBUMIN SERPL-MCNC: 3.3 G/DL (ref 3.5–5.2)
ANION GAP SERPL CALCULATED.3IONS-SCNC: 15.1 MMOL/L
BASOPHILS # BLD AUTO: 0.05 10*3/MM3 (ref 0–0.2)
BASOPHILS NFR BLD AUTO: 0.2 % (ref 0–1.5)
BH CV ECHO MEAS - ACS: 2 CM
BH CV ECHO MEAS - AO MAX PG (FULL): 1.3 MMHG
BH CV ECHO MEAS - AO MAX PG: 5.7 MMHG
BH CV ECHO MEAS - AO MEAN PG (FULL): 2 MMHG
BH CV ECHO MEAS - AO MEAN PG: 3 MMHG
BH CV ECHO MEAS - AO ROOT AREA (BSA CORRECTED): 1.4
BH CV ECHO MEAS - AO ROOT AREA: 7.1 CM^2
BH CV ECHO MEAS - AO ROOT DIAM: 3 CM
BH CV ECHO MEAS - AO V2 MAX: 119 CM/SEC
BH CV ECHO MEAS - AO V2 MEAN: 78.8 CM/SEC
BH CV ECHO MEAS - AO V2 VTI: 22.9 CM
BH CV ECHO MEAS - AVA(I,A): 2.1 CM^2
BH CV ECHO MEAS - AVA(I,D): 2.1 CM^2
BH CV ECHO MEAS - AVA(V,A): 2.7 CM^2
BH CV ECHO MEAS - AVA(V,D): 2.7 CM^2
BH CV ECHO MEAS - BSA(HAYCOCK): 2.4 M^2
BH CV ECHO MEAS - BSA: 2.2 M^2
BH CV ECHO MEAS - BZI_BMI: 50.8 KILOGRAMS/M^2
BH CV ECHO MEAS - BZI_METRIC_HEIGHT: 157.5 CM
BH CV ECHO MEAS - BZI_METRIC_WEIGHT: 126.1 KG
BH CV ECHO MEAS - EDV(MOD-SP2): 42 ML
BH CV ECHO MEAS - EDV(MOD-SP4): 60 ML
BH CV ECHO MEAS - EDV(TEICH): 24.6 ML
BH CV ECHO MEAS - EF(CUBED): 54.5 %
BH CV ECHO MEAS - EF(MOD-BP): 49 %
BH CV ECHO MEAS - EF(MOD-SP2): 50 %
BH CV ECHO MEAS - EF(MOD-SP4): 48.3 %
BH CV ECHO MEAS - EF(TEICH): 48.3 %
BH CV ECHO MEAS - ESV(MOD-SP2): 21 ML
BH CV ECHO MEAS - ESV(MOD-SP4): 31 ML
BH CV ECHO MEAS - ESV(TEICH): 12.7 ML
BH CV ECHO MEAS - FS: 23.1 %
BH CV ECHO MEAS - IVS/LVPW: 1
BH CV ECHO MEAS - IVSD: 1.1 CM
BH CV ECHO MEAS - LAT PEAK E' VEL: 17 CM/SEC
BH CV ECHO MEAS - LV DIASTOLIC VOL/BSA (35-75): 27.3 ML/M^2
BH CV ECHO MEAS - LV MASS(C)D: 78 GRAMS
BH CV ECHO MEAS - LV MASS(C)DI: 35.5 GRAMS/M^2
BH CV ECHO MEAS - LV MAX PG: 4.3 MMHG
BH CV ECHO MEAS - LV MEAN PG: 1 MMHG
BH CV ECHO MEAS - LV SYSTOLIC VOL/BSA (12-30): 14.1 ML/M^2
BH CV ECHO MEAS - LV V1 MAX: 104 CM/SEC
BH CV ECHO MEAS - LV V1 MEAN: 50.3 CM/SEC
BH CV ECHO MEAS - LV V1 VTI: 15 CM
BH CV ECHO MEAS - LVIDD: 2.6 CM
BH CV ECHO MEAS - LVIDS: 2 CM
BH CV ECHO MEAS - LVLD AP2: 6.3 CM
BH CV ECHO MEAS - LVLD AP4: 7.1 CM
BH CV ECHO MEAS - LVLS AP2: 5.8 CM
BH CV ECHO MEAS - LVLS AP4: 5.9 CM
BH CV ECHO MEAS - LVOT AREA (M): 3.1 CM^2
BH CV ECHO MEAS - LVOT AREA: 3.1 CM^2
BH CV ECHO MEAS - LVOT DIAM: 2 CM
BH CV ECHO MEAS - LVPWD: 1.1 CM
BH CV ECHO MEAS - MED PEAK E' VEL: 8 CM/SEC
BH CV ECHO MEAS - MV DEC SLOPE: 462 CM/SEC^2
BH CV ECHO MEAS - MV DEC TIME: 0.19 SEC
BH CV ECHO MEAS - MV E MAX VEL: 85.7 CM/SEC
BH CV ECHO MEAS - MV MAX PG: 3 MMHG
BH CV ECHO MEAS - MV MEAN PG: 1 MMHG
BH CV ECHO MEAS - MV P1/2T MAX VEL: 86.3 CM/SEC
BH CV ECHO MEAS - MV P1/2T: 54.7 MSEC
BH CV ECHO MEAS - MV V2 MAX: 86.2 CM/SEC
BH CV ECHO MEAS - MV V2 MEAN: 49.3 CM/SEC
BH CV ECHO MEAS - MV V2 VTI: 22.1 CM
BH CV ECHO MEAS - MVA P1/2T LCG: 2.5 CM^2
BH CV ECHO MEAS - MVA(P1/2T): 4 CM^2
BH CV ECHO MEAS - MVA(VTI): 2.1 CM^2
BH CV ECHO MEAS - PA ACC TIME: 0.11 SEC
BH CV ECHO MEAS - PA MAX PG (FULL): 4.6 MMHG
BH CV ECHO MEAS - PA MAX PG: 7.5 MMHG
BH CV ECHO MEAS - PA PR(ACCEL): 30 MMHG
BH CV ECHO MEAS - PA V2 MAX: 137 CM/SEC
BH CV ECHO MEAS - PULM A REVS DUR: 0.13 SEC
BH CV ECHO MEAS - PULM A REVS VEL: 21.6 CM/SEC
BH CV ECHO MEAS - PULM DIAS VEL: 22 CM/SEC
BH CV ECHO MEAS - PULM S/D: 1.5
BH CV ECHO MEAS - PULM SYS VEL: 33.5 CM/SEC
BH CV ECHO MEAS - PVA(V,A): 1.9 CM^2
BH CV ECHO MEAS - PVA(V,D): 1.9 CM^2
BH CV ECHO MEAS - QP/QS: 0.97
BH CV ECHO MEAS - RV MAX PG: 2.9 MMHG
BH CV ECHO MEAS - RV MEAN PG: 1 MMHG
BH CV ECHO MEAS - RV V1 MAX: 84.9 CM/SEC
BH CV ECHO MEAS - RV V1 MEAN: 54.6 CM/SEC
BH CV ECHO MEAS - RV V1 VTI: 14.5 CM
BH CV ECHO MEAS - RVOT AREA: 3.1 CM^2
BH CV ECHO MEAS - RVOT DIAM: 2 CM
BH CV ECHO MEAS - SI(AO): 73.6 ML/M^2
BH CV ECHO MEAS - SI(CUBED): 4.4 ML/M^2
BH CV ECHO MEAS - SI(LVOT): 21.4 ML/M^2
BH CV ECHO MEAS - SI(MOD-SP2): 9.6 ML/M^2
BH CV ECHO MEAS - SI(MOD-SP4): 13.2 ML/M^2
BH CV ECHO MEAS - SI(TEICH): 5.4 ML/M^2
BH CV ECHO MEAS - SV(AO): 161.9 ML
BH CV ECHO MEAS - SV(CUBED): 9.6 ML
BH CV ECHO MEAS - SV(LVOT): 47.1 ML
BH CV ECHO MEAS - SV(MOD-SP2): 21 ML
BH CV ECHO MEAS - SV(MOD-SP4): 29 ML
BH CV ECHO MEAS - SV(RVOT): 45.6 ML
BH CV ECHO MEAS - SV(TEICH): 11.9 ML
BH CV ECHO MEAS - TAPSE (>1.6): 1.4 CM2
BH CV ECHO MEASUREMENTS AVERAGE E/E' RATIO: 6.86
BH CV XLRA - RV BASE: 2.9 CM
BH CV XLRA - TDI S': 10 CM/SEC
BUN BLD-MCNC: 33 MG/DL (ref 8–23)
BUN/CREAT SERPL: 28.9 (ref 7–25)
CA-I BLD-MCNC: 4.4 MG/DL (ref 4.6–5.4)
CA-I SERPL ISE-MCNC: 1.11 MMOL/L (ref 1.15–1.35)
CALCIUM SPEC-SCNC: 8.2 MG/DL (ref 8.6–10.5)
CHLORIDE SERPL-SCNC: 93 MMOL/L (ref 98–107)
CO2 SERPL-SCNC: 28.9 MMOL/L (ref 22–29)
CREAT BLD-MCNC: 1.14 MG/DL (ref 0.57–1)
DEPRECATED RDW RBC AUTO: 44.8 FL (ref 37–54)
EOSINOPHIL # BLD AUTO: 0.1 10*3/MM3 (ref 0–0.4)
EOSINOPHIL NFR BLD AUTO: 0.4 % (ref 0.3–6.2)
ERYTHROCYTE [DISTWIDTH] IN BLOOD BY AUTOMATED COUNT: 13.9 % (ref 12.3–15.4)
GFR SERPL CREATININE-BSD FRML MDRD: 48 ML/MIN/1.73
GLUCOSE BLD-MCNC: 111 MG/DL (ref 65–99)
HCT VFR BLD AUTO: 46.5 % (ref 34–46.6)
HGB BLD-MCNC: 15.3 G/DL (ref 12–15.9)
IMM GRANULOCYTES # BLD AUTO: 0.45 10*3/MM3 (ref 0–0.05)
IMM GRANULOCYTES NFR BLD AUTO: 1.7 % (ref 0–0.5)
LEFT ATRIUM VOLUME INDEX: 29 ML/M2
LYMPHOCYTES # BLD AUTO: 1.98 10*3/MM3 (ref 0.7–3.1)
LYMPHOCYTES NFR BLD AUTO: 7.3 % (ref 19.6–45.3)
MAGNESIUM SERPL-MCNC: 2 MG/DL (ref 1.6–2.4)
MAXIMAL PREDICTED HEART RATE: 156 BPM
MCH RBC QN AUTO: 29 PG (ref 26.6–33)
MCHC RBC AUTO-ENTMCNC: 32.9 G/DL (ref 31.5–35.7)
MCV RBC AUTO: 88.2 FL (ref 79–97)
MONOCYTES # BLD AUTO: 2.44 10*3/MM3 (ref 0.1–0.9)
MONOCYTES NFR BLD AUTO: 9 % (ref 5–12)
NEUTROPHILS # BLD AUTO: 22.18 10*3/MM3 (ref 1.4–7)
NEUTROPHILS NFR BLD AUTO: 81.4 % (ref 42.7–76)
NRBC BLD AUTO-RTO: 0 /100 WBC (ref 0–0)
PHOSPHATE SERPL-MCNC: 3.6 MG/DL (ref 2.5–4.5)
PLATELET # BLD AUTO: 199 10*3/MM3 (ref 140–450)
PMV BLD AUTO: 11 FL (ref 6–12)
POTASSIUM BLD-SCNC: 4.2 MMOL/L (ref 3.5–5.2)
RBC # BLD AUTO: 5.27 10*6/MM3 (ref 3.77–5.28)
SODIUM BLD-SCNC: 137 MMOL/L (ref 136–145)
STRESS TARGET HR: 133 BPM
WBC NRBC COR # BLD: 27.2 10*3/MM3 (ref 3.4–10.8)

## 2019-03-17 PROCEDURE — 85025 COMPLETE CBC W/AUTO DIFF WBC: CPT | Performed by: INTERNAL MEDICINE

## 2019-03-17 PROCEDURE — 93306 TTE W/DOPPLER COMPLETE: CPT | Performed by: INTERNAL MEDICINE

## 2019-03-17 PROCEDURE — 80069 RENAL FUNCTION PANEL: CPT | Performed by: INTERNAL MEDICINE

## 2019-03-17 PROCEDURE — 25010000002 ONDANSETRON PER 1 MG: Performed by: INTERNAL MEDICINE

## 2019-03-17 PROCEDURE — 63710000001 METHYLPREDNISOLONE 4 MG TABLET THERAPY PACK 21 EACH DISP PACK: Performed by: NURSE PRACTITIONER

## 2019-03-17 PROCEDURE — 99024 POSTOP FOLLOW-UP VISIT: CPT | Performed by: SURGERY

## 2019-03-17 PROCEDURE — 99232 SBSQ HOSP IP/OBS MODERATE 35: CPT | Performed by: NURSE PRACTITIONER

## 2019-03-17 PROCEDURE — 83735 ASSAY OF MAGNESIUM: CPT | Performed by: INTERNAL MEDICINE

## 2019-03-17 PROCEDURE — 93306 TTE W/DOPPLER COMPLETE: CPT

## 2019-03-17 PROCEDURE — 97110 THERAPEUTIC EXERCISES: CPT

## 2019-03-17 PROCEDURE — 25010000002 CALCIUM GLUCONATE PER 10 ML: Performed by: INTERNAL MEDICINE

## 2019-03-17 PROCEDURE — 82330 ASSAY OF CALCIUM: CPT | Performed by: INTERNAL MEDICINE

## 2019-03-17 RX ORDER — HYDRALAZINE HYDROCHLORIDE 10 MG/1
10 TABLET, FILM COATED ORAL EVERY 8 HOURS SCHEDULED
Status: DISCONTINUED | OUTPATIENT
Start: 2019-03-17 | End: 2019-03-17

## 2019-03-17 RX ORDER — CALCITRIOL 0.25 UG/1
0.25 CAPSULE, LIQUID FILLED ORAL EVERY 12 HOURS SCHEDULED
Status: DISCONTINUED | OUTPATIENT
Start: 2019-03-17 | End: 2019-03-20

## 2019-03-17 RX ADMIN — CALCIUM CARBONATE-VITAMIN D TAB 500 MG-200 UNIT 1000 MG: 500-200 TAB at 08:40

## 2019-03-17 RX ADMIN — ONDANSETRON HYDROCHLORIDE 4 MG: 2 SOLUTION INTRAMUSCULAR; INTRAVENOUS at 12:50

## 2019-03-17 RX ADMIN — SODIUM CHLORIDE, PRESERVATIVE FREE 3 ML: 5 INJECTION INTRAVENOUS at 11:04

## 2019-03-17 RX ADMIN — POLYETHYLENE GLYCOL 3350 17 G: 17 POWDER, FOR SOLUTION ORAL at 08:51

## 2019-03-17 RX ADMIN — FAMOTIDINE 20 MG: 20 TABLET, FILM COATED ORAL at 21:31

## 2019-03-17 RX ADMIN — SODIUM CHLORIDE, PRESERVATIVE FREE 3 ML: 5 INJECTION INTRAVENOUS at 21:31

## 2019-03-17 RX ADMIN — METOPROLOL TARTRATE 25 MG: 25 TABLET ORAL at 21:31

## 2019-03-17 RX ADMIN — SODIUM CHLORIDE 250 ML: 9 INJECTION, SOLUTION INTRAVENOUS at 00:11

## 2019-03-17 RX ADMIN — SENNOSIDES AND DOCUSATE SODIUM 2 TABLET: 8.6; 5 TABLET ORAL at 21:31

## 2019-03-17 RX ADMIN — CALCIUM CARBONATE-VITAMIN D TAB 500 MG-200 UNIT 1000 MG: 500-200 TAB at 21:31

## 2019-03-17 RX ADMIN — CALCIUM GLUCONATE 1 G: 98 INJECTION, SOLUTION INTRAVENOUS at 22:08

## 2019-03-17 RX ADMIN — METHYLPREDNISOLONE 4 MG: 4 TABLET ORAL at 06:32

## 2019-03-17 RX ADMIN — CALCITRIOL CAPSULES 0.25 MCG 0.25 MCG: 0.25 CAPSULE ORAL at 21:31

## 2019-03-17 NOTE — PLAN OF CARE
Problem: Patient Care Overview  Goal: Plan of Care Review  Outcome: Ongoing (interventions implemented as appropriate)   03/17/19 8511   Coping/Psychosocial   Plan of Care Reviewed With patient   OTHER   Outcome Summary Pt walked around RN station 150' CGA using her cane. Double vision unchanged this visit she says.

## 2019-03-17 NOTE — PLAN OF CARE
Problem: Patient Care Overview  Goal: Plan of Care Review  Outcome: Ongoing (interventions implemented as appropriate)   03/17/19 0433   Coping/Psychosocial   Plan of Care Reviewed With patient   OTHER   Outcome Summary B/P dropped this night, s/p metoprolol, hydralazine, and lasix. HR 50's-60's (a-fib) and B/P 83/53. Orders received. NS 250ml bolus over 30 min given. Future hydralazine ddecreased with SBP parameters. B/P up some. Will continue to monitor.    Plan of Care Review   Progress no change     Goal: Individualization and Mutuality  Outcome: Ongoing (interventions implemented as appropriate)      Problem: Skin Injury Risk (Adult)  Goal: Skin Health and Integrity  Outcome: Ongoing (interventions implemented as appropriate)      Problem: Pain, Acute (Adult)  Goal: Acceptable Pain Control/Comfort Level  Outcome: Ongoing (interventions implemented as appropriate)      Problem: Fall Risk (Adult)  Goal: Identify Related Risk Factors and Signs and Symptoms  Outcome: Ongoing (interventions implemented as appropriate)    Goal: Absence of Fall  Outcome: Ongoing (interventions implemented as appropriate)

## 2019-03-17 NOTE — PROGRESS NOTES
"    Patient Name: Joan Almanza  :1955  64 y.o.      Patient Care Team:  Juarez Winters MD as PCP - General (Family Medicine)    Chief Complaint:  Afib with RVR, diplopia    Interval History:    Confortable, up in chair, no angina, soa.  Continues with diplopia    Objective   Vital Signs  Temp:  [97.5 °F (36.4 °C)-97.8 °F (36.6 °C)] 97.5 °F (36.4 °C)  Heart Rate:  [] 69  Resp:  [18] 18  BP: ()/(53-93) 98/62    Intake/Output Summary (Last 24 hours) at 3/17/2019 1244  Last data filed at 3/17/2019 1004  Gross per 24 hour   Intake 640 ml   Output --   Net 640 ml     Flowsheet Rows      First Filed Value   Admission Height  157.5 cm (62\") Documented at 2019 1050   Admission Weight  122 kg (270 lb) Documented at 2019 1050          Physical Exam:   General Appearance:    Alert, cooperative, in no acute distress   Lungs:     Clear to auscultation.  Normal respiratory effort and rate.      Heart:    Irregular rhythm and normal rate, normal S1 and S2, no murmurs, gallops or rubs.     Chest Wall:    No abnormalities observed   Abdomen:     Soft, nontender, positive bowel sounds.     Extremities:   no cyanosis, clubbing or edema.  No marked joint deformities.  Adequate musculoskeletal strength.       Results Review:    Results from last 7 days   Lab Units 19  0540   SODIUM mmol/L 137   POTASSIUM mmol/L 4.2   CHLORIDE mmol/L 93*   CO2 mmol/L 28.9   BUN mg/dL 33*   CREATININE mg/dL 1.14*   GLUCOSE mg/dL 111*   CALCIUM mg/dL 8.2*         Results from last 7 days   Lab Units 19  0540   WBC 10*3/mm3 27.20*   HEMOGLOBIN g/dL 15.3   HEMATOCRIT % 46.5   PLATELETS 10*3/mm3 199         Results from last 7 days   Lab Units 19  0540   MAGNESIUM mg/dL 2.0                   Medication Review:     calcitriol 0.25 mcg Oral Daily   calcium-vitamin D 1,000 mg Oral Daily   famotidine 20 mg Oral BID   metoprolol tartrate 25 mg Oral Q12H   polyethylene glycol 17 g Oral Daily "   sennosides-docusate sodium 2 tablet Oral Nightly   sodium chloride 3 mL Intravenous Q12H             Assessment/Plan     1.  PAF with RVR - on PO metoprolol, remains in afib rate 60s-80s, increases with minimal activity.   Will need to watch for bradycardia which is noted earlier in her admission.  Will defer AC to neuo and surgery as her chads Vasc score  is elevated at (2). Echo pending.  2.  History of asymptomatic sinus bradycardia.  We will need to watch with the resumption of AV chaz blocker therapy  3.  History of hypercalcemia and hyperparathyroidism, status parathyroidectomy, surgery followiing  4.  Hypertension - Hypotensive this morning, lasix, amlodipine and hydralazine stopped.  Hold parameters on metoprolol per hospitalist  5.  Meningioma - Neuro following  6.  Cranial AV fistula - neuro following       MILA Murrell  [facility]  03/17/19  12:44 PM

## 2019-03-17 NOTE — THERAPY TREATMENT NOTE
Acute Care - Physical Therapy Treatment Note  [facility]     Patient Name: Joan Almanza  : 1955  MRN: [id number]  Today's Date: 3/17/2019        Referring Physician: Paresh    Admit Date: 3/4/2019    Visit Dx:    ICD-10-CM ICD-9-CM   1. Cerebral edema (CMS/HCC) G93.6 348.5   2. Meningioma (CMS/HCC) D32.9 225.2   3. Lateral rectus palsy, left H49.22 378.54   4. 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 Hypercalcemia E83.52 275.42   6. Hyperparathyroidism (CMS/HCC) E21.3 252.00   7. AVF (arteriovenous fistula) (CMS/HCC) I77.0 447.0   8. Difficulty walking R26.2 719.7     Patient Active Problem List   Diagnosis   • Cerebral edema (CMS/HCC)   • Meningioma (CMS/HCC)   • AVF (arteriovenous fistula) (CMS/HCC)   • Diplopia   • 6th nerve palsy, left   • Hypercalcemia   • Hyperparathyroidism (CMS/HCC)   • Morbid obesity (CMS/HCC)       Therapy Treatment    Rehabilitation Treatment Summary     Row Name 1956             Treatment Time/Intention    Discipline  physical therapist  -CS      Document Type  therapy note (daily note)  -CS      Subjective Information  no complaints  -CS      Mode of Treatment  physical therapy  -CS      Patient/Family Observations  Pt up in chair, no acute distress  -CS      Therapy Frequency (PT Clinical Impression)  daily  -CS      Patient Effort  good  -CS      Existing Precautions/Restrictions  fall  -CS      Equipment Issued to Patient  gait belt;cane, standard  -CS      Recorded by [CS] Caleb Rogel, PT 19      Row Name 1956             Cognitive Assessment/Intervention- PT/OT    Orientation Status (Cognition)  oriented x 3  -CS      Follows Commands (Cognition)  WNL  -CS      Safety Deficit (Cognitive)  impulsivity;insight into deficits/self awareness;judgment;problem solving;safety precautions follow-through/compliance;awareness of need for assistance;severe deficit  -CS      Recorded by [CS] Caleb Rogel, PT 19      [place]  Name 03/17/19 0856             Bed Mobility Assessment/Treatment    Bed Mobility Assessment/Treatment  --  -CS      Recorded by [CS] Caleb Rogel, PT 03/17/19 0859      Row Name 03/17/19 0856             Transfer Assessment/Treatment    Transfer Assessment/Treatment  sit-stand transfer;stand-sit transfer  -CS      Recorded by [CS] Caleb Rogel, PT 03/17/19 0859      Row Name 03/17/19 0856             Sit-Stand Transfer    Sit-Stand [place] (Transfers)  supervision  -CS      Assistive Device (Sit-Stand Transfers)  walker, front-wheeled  -CS      Recorded by [CS] Caleb Rogel, PT 03/17/19 0859      Row Name 03/17/19 0856             Stand-Sit Transfer    Stand-Sit [place] (Transfers)  supervision  -CS      Assistive Device (Stand-Sit Transfers)  walker, front-wheeled  -CS      Recorded by [CS] Caleb Rogel, PT 03/17/19 0859      Row Name 03/17/19 0856             Gait/Stairs Assessment/Training    [place] Level (Gait)  contact guard  -CS      Assistive Device (Gait)  cane, straight  -CS      Distance in Feet (Gait)  150  -CS      Pattern (Gait)  step-through  -CS      Deviations/Abnormal Patterns (Gait)  micheal decreased;gait speed decreased;stride length decreased  -CS      Bilateral Gait Deviations  forward flexed posture  -CS      Recorded by [CS] Caleb Rogel, PT 03/17/19 0859      Row Name 03/17/19 0856             Positioning and Restraints    Pre-Treatment Position  sitting in chair/recliner  -CS      Post Treatment Position  chair  -CS      In Chair  reclined;encouraged to call for assist;call light within reach  -CS      Recorded by [CS] Caleb Rogel, PT 03/17/19 0859      Row Name 03/17/19 0856             Pain Scale: Numbers Pre/Post-Treatment    Pain Scale: Numbers, Pretreatment  0/10 - no pain  -CS      Pain Scale: Numbers, Post-Treatment  0/10 - no pain  -CS      Recorded by [CS] Caleb Rogel, PT 03/17/19 0859      Row Name                Wound 03/13/19 4905  Right neck incision    Wound - Properties Group Date first assessed: 03/13/19 [SL] Time first assessed: 1239 [SL] Side: Right [SL2] Location: neck [SL] Type: incision [SL] Recorded by:  [SL] Mikaela Domingo RN 03/13/19 1239 [SL2] Mikaela Domingo RN 03/13/19 1250    Row Name 03/17/19 0856             Coping    Observed Emotional State  calm;cooperative;pleasant  -CS      Verbalized Emotional State  acceptance  -CS      Recorded by [CS] Caleb Rogel, PT 03/17/19 0859      Row Name 03/17/19 0856             Plan of Care Review    Plan of Care Reviewed With  patient  -CS      Recorded by [CS] Caleb Rogel, PT 03/17/19 0859      Row Name 03/17/19 0856             Outcome Summary/Treatment Plan (PT)    Anticipated Discharge Disposition (PT)  home with home health;home with assist  -CS      Recorded by [CS] Caleb Rogel, PT 03/17/19 0859        User Key  (r) = Recorded By, (t) = Taken By, (c) = Cosigned By    Initials Name Effective Dates Discipline    SL Mikaela Domingo, RN 06/16/16 -  Nurse    CS Caleb Rogel, PT 05/14/18 -  PT          Wound 03/13/19 1239 Right neck incision (Active)   Dressing Appearance open to air 3/17/2019  8:41 AM   Closure Liquid skin adhesive 3/17/2019  8:41 AM   Base clean;dry 3/17/2019  8:41 AM   Drainage Amount none 3/17/2019  8:41 AM           Physical Therapy Education     Title: PT OT SLP Therapies (Done)     Topic: Physical Therapy (Done)     Point: Mobility training (Done)     Learning Progress Summary           Patient Acceptance, E,TB, VU by CS at 3/17/2019  8:59 AM    Acceptance, E,TB, VU by CS at 3/16/2019  4:14 PM                   Point: Home exercise program (Done)     Learning Progress Summary           Patient Acceptance, E,TB, VU by CS at 3/17/2019  8:59 AM    Acceptance, E,TB, VU by CS at 3/16/2019  4:14 PM                   Point: Body mechanics (Done)     Learning Progress Summary           Patient Acceptance, E,TB, VU by CS  at 3/17/2019  8:59 AM    Acceptance, E,TB, VU by CS at 3/16/2019  4:14 PM                   Point: Precautions (Done)     Learning Progress Summary           Patient Acceptance, E,TB, VU by  at 3/17/2019  8:59 AM    Acceptance, E,TB, VU by  at 3/16/2019  4:14 PM                               User Key     Initials Effective Dates Name Provider Type Discipline     05/14/18 -  Caleb Rogel, PT Physical Therapist PT                PT Recommendation and Plan  Anticipated Discharge Disposition (PT): home with home health, home with assist  Planned Therapy Interventions (PT Eval): balance training, bed mobility training, gait training, home exercise program, transfer training  Therapy Frequency (PT Clinical Impression): daily  Outcome Summary/Treatment Plan (PT)  Anticipated Discharge Disposition (PT): home with home health, home with assist  Plan of Care Reviewed With: patient  Outcome Summary: Pt walked around RN station 150' CGA using her cane. Double vision unchanged this visit she says.   Outcome Measures     Row Name 03/17/19 0900 03/16/19 1600          How much help from another person do you currently need...    Turning from your back to your side while in flat bed without using bedrails?  4  -CS  3  -CS     Moving from lying on back to sitting on the side of a flat bed without bedrails?  4  -CS  3  -CS     Moving to and from a bed to a chair (including a wheelchair)?  3  -CS  3  -CS     Standing up from a chair using your arms (e.g., wheelchair, bedside chair)?  3  -CS  3  -CS     Climbing 3-5 steps with a railing?  3  -CS  3  -CS     To walk in hospital room?  3  -CS  3  -CS     AM-PAC 6 Clicks Score  20  -CS  18  -CS        Functional Assessment    Outcome Measure Options  AM-PAC 6 Clicks Basic Mobility (PT)  -CS  AM-PAC 6 Clicks Basic Mobility (PT)  -CS       User Key  (r) = Recorded By, (t) = Taken By, (c) = Cosigned By    Initials Name Provider Type    Caleb Alexandra, PT Physical Therapist          Time Calculation:   PT Charges     Row Name 03/17/19 0900             Time Calculation    Start Time  0826  -CS      Stop Time  0836  -CS      Time Calculation (min)  10 min  -CS      PT Received On  03/17/19  -CS      PT - Next Appointment  03/18/19  -        User Key  (r) = Recorded By, (t) = Taken By, (c) = Cosigned By    Initials Name Provider Type    CS Caleb Rogel, PT Physical Therapist        Therapy Suggested Charges     Code   Minutes Charges    None           Therapy Charges for Today     Code Description Service Date Service Provider Modifiers Qty    05633302003 HC PT THER PROC EA 15 MIN 3/16/2019 Caleb Rogel, PT GP 1    82234318765 HC PT THER SUPP EA 15 MIN 3/16/2019 Caleb Rogel, PT GP 1    25946202460 HC PT EVAL MOD COMPLEXITY 1 3/16/2019 Caleb Rogel, PT GP 1    22535622241 HC PT THER PROC EA 15 MIN 3/17/2019 Caleb Rogel, PT GP 1          PT G-Codes  Outcome Measure Options: AM-PAC 6 Clicks Basic Mobility (PT)  AM-PAC 6 Clicks Score: 20    Caleb Rogel PT  3/17/2019

## 2019-03-17 NOTE — PROGRESS NOTES
Once again consulted with regard to clearance of this patient for the use of anticoagulation.    The patient has both a brain tumor as well as a vascular malformation of the brain.  Anticoagulation does not cause bleeding but if bleeding were to occur it could be devastating.    It is impossible to calculate the increase in risk because of the presence of these 2 neurologic intracranial problems.    The risk reduction based upon the patient's МАРИЯ D vascular score should be weighed against the potential increase in death rate if she were to bleed from a vascular malformation.  The chances of bleeding from a vascular malformation are yet undetermined since the vascular malformation is not been characterized by angiography.  The rate of bleeding ranges from 0-7% depending upon the angiographic data.    Angiography is not indicated otherwise.    I am not sure that this is very helpful however the risks associated with atrial fibrillation and the risk reduction by anticoagulation should govern the treatment of this patient.

## 2019-03-17 NOTE — PROGRESS NOTES
"Chief Complaint:    POD 4, S/P parathyroid resection and biopsy, resection of thyroid nodule.    Subjective:    No complaints today.  Tolerating diet.    Objective:    Vitals:    03/17/19 0628 03/17/19 0745 03/17/19 1339 03/17/19 1412   BP: 104/63 98/62 98/62 110/81   BP Location:  Left arm  Left arm   Patient Position:  Sitting  Lying   Pulse: 69  69    Resp:  18  18   Temp:  97.5 °F (36.4 °C)  97.4 °F (36.3 °C)   TempSrc:  Oral  Oral   SpO2:   92%    Weight:   126 kg (278 lb)    Height:   157.5 cm (62\")        Lungs: Clear  Heart: Atrial fibrillation on monitor.  Heart rate currently around 100.  Neck: Incision looks good.  No hematoma.  No erythema or drainage.  Extremities: Warm    Labs reviewed.    Ionized calcium 5.3--> 1.11--> 4.4    Assessment:    POD 4, S/P parathyroid resection and biopsy, resection of thyroid nodule.  Morbid obesity (BMI 52.1)  Atrial fibrillation    Plan:    Continue regular diet.  Calcium management per nephrology.  Okay to use anticoagulants if desired by cardiology for atrial fibrillation.  "

## 2019-03-17 NOTE — PROGRESS NOTES
"   LOS: 13 days    Patient Care Team:  Juarez Winters MD as PCP - General (Family Medicine)    Chief Complaint:    Chief Complaint   Patient presents with   • Headache   • Eye Pain   • Diplopia     Follow UP Hypercalcemia  Subjective     Interval History:   No difficulty swallowing; breathing is comfortable; throat is sore but better today; voice is normal to her ear; no involuntary movements of any muscles    Objective     Vital Signs  Temp:  [97.4 °F (36.3 °C)-97.5 °F (36.4 °C)] 97.4 °F (36.3 °C)  Heart Rate:  [65-89] 69  Resp:  [18] 18  BP: ()/(53-81) 110/81    Flowsheet Rows      First Filed Value   Admission Height  157.5 cm (62\") Documented at 03/04/2019 1050   Admission Weight  122 kg (270 lb) Documented at 03/04/2019 1050          No intake/output data recorded.  I/O last 3 completed shifts:  In: 640 [P.O.:390; IV Piggyback:250]  Out: -     Intake/Output Summary (Last 24 hours) at 3/17/2019 1936  Last data filed at 3/17/2019 1004  Gross per 24 hour   Intake 640 ml   Output --   Net 640 ml       Physical Exam:  Exam:  GEN:               No distress, appears stated age; overweight  EYES:              Anicteric sclera  ENT:                External ears/nose normal, MM are moist  NECK:             No adenopathy, JVP none; incision is C/D/I  LUNGS:           Normal chest on inspection; not labored  CV:                  Normal S1S2, without rub  ABD:                Non-tender, non-distended, soft, +BS  EXT:                No edema; no cyanosis       Results Review:    Results from last 7 days   Lab Units 03/17/19  0540 03/16/19  0407 03/15/19  1851 03/15/19  0518  03/14/19  0615   SODIUM mmol/L 137  --   --  134*  --  137   POTASSIUM mmol/L 4.2  --   --  4.1  --  4.4   CHLORIDE mmol/L 93*  --   --  91*  --  96*   CO2 mmol/L 28.9  --   --  26.8  --  28.9   BUN mg/dL 33*  --   --  35*  --  24*   CREATININE mg/dL 1.14*  --   --  1.08*  --  0.92   CALCIUM mg/dL 8.2* 10.1 10.3 10.1   < > 11.5*  11.6* "   GLUCOSE mg/dL 111*  --   --  153*  --  98    < > = values in this interval not displayed.       Estimated Creatinine Clearance: 63.4 mL/min (A) (by C-G formula based on SCr of 1.14 mg/dL (H)).    Results from last 7 days   Lab Units 03/17/19  0540 03/15/19  0518 03/14/19  0615   MAGNESIUM mg/dL 2.0  --   --    PHOSPHORUS mg/dL 3.6 3.3 2.4*             Results from last 7 days   Lab Units 03/17/19  0540 03/16/19  0407 03/15/19  0518 03/14/19  0615 03/11/19  0538   WBC 10*3/mm3 27.20* 29.53* 25.15* 22.14* 16.96*   HEMOGLOBIN g/dL 15.3 16.2* 16.9* 16.7* 15.3   PLATELETS 10*3/mm3 199 202 221 213 232               Imaging Results (last 24 hours)     ** No results found for the last 24 hours. **          calcitriol 0.25 mcg Oral Daily   calcium-vitamin D 1,000 mg Oral Daily   famotidine 20 mg Oral BID   metoprolol tartrate 25 mg Oral Q12H   polyethylene glycol 17 g Oral Daily   sennosides-docusate sodium 2 tablet Oral Nightly   sodium chloride 3 mL Intravenous Q12H          Medication Review:   Current Facility-Administered Medications   Medication Dose Route Frequency Provider Last Rate Last Dose   • acetaminophen (TYLENOL) tablet 650 mg  650 mg Oral Q4H PRN Michelle Reynoso MD        Or   • acetaminophen (TYLENOL) 160 MG/5ML solution 650 mg  650 mg Oral Q4H PRN Michelle Reynoso MD        Or   • acetaminophen (TYLENOL) suppository 650 mg  650 mg Rectal Q4H PRN Michelle Reynoso MD       • bisacodyl (DULCOLAX) suppository 10 mg  10 mg Rectal Daily PRN Juancho Yoder MD   10 mg at 03/16/19 1227   • calcitriol (ROCALTROL) capsule 0.25 mcg  0.25 mcg Oral Daily Ty Lofton MD   0.25 mcg at 03/16/19 0849   • calcium-vitamin D 500-200 MG-UNIT tablet 1,000 mg  1,000 mg Oral Daily Juancho العلي MD   1,000 mg at 03/17/19 0840   • famotidine (PEPCID) tablet 20 mg  20 mg Oral BID Elisabet Skinner APRN   20 mg at 03/16/19 2148   • hydrALAZINE (APRESOLINE) injection 10 mg  10 mg Intravenous Q6H PRN Elizabeth,  MILA Connolly       • HYDROcodone-acetaminophen (NORCO)  MG per tablet 1 tablet  1 tablet Oral Q4H PRN Michelle Reynoso MD   1 tablet at 03/13/19 1730   • HYDROcodone-acetaminophen (NORCO) 5-325 MG per tablet 1 tablet  1 tablet Oral Q4H PRN Michelle Reynoso MD   1 tablet at 03/15/19 0639   • HYDROmorphone (DILAUDID) injection 0.5 mg  0.5 mg Intravenous Q2H PRN Michelle Reynoso MD        And   • naloxone (NARCAN) injection 0.1 mg  0.1 mg Intravenous Q5 Min PRN Michelle Reynoso MD       • metoprolol tartrate (LOPRESSOR) tablet 25 mg  25 mg Oral Q12H Juancho Yoder MD   25 mg at 03/16/19 2139   • morphine injection 4 mg  4 mg Intravenous Q2H PRN Michelle Reynoso MD        And   • naloxone (NARCAN) injection 0.4 mg  0.4 mg Intravenous Q5 Min PRN Michelle Reynoso MD       • nitroglycerin (NITROSTAT) SL tablet 0.4 mg  0.4 mg Sublingual Q5 Min PRN Alice Simmons MD       • ondansetron (ZOFRAN) injection 4 mg  4 mg Intravenous Q6H PRN Alice Simmons MD   4 mg at 03/17/19 1250   • polyethylene glycol 3350 powder (packet)  17 g Oral Daily Juancho Yoder MD   17 g at 03/17/19 0851   • sennosides-docusate sodium (SENOKOT-S) 8.6-50 MG tablet 2 tablet  2 tablet Oral Nightly Abel Lee MD   2 tablet at 03/16/19 2139   • sodium chloride 0.9 % flush 10 mL  10 mL Intravenous PRN Bull Branch II, MD       • sodium chloride 0.9 % flush 3 mL  3 mL Intravenous Q12H Alice Simmons MD   3 mL at 03/17/19 1104   • sodium chloride 0.9 % flush 3-10 mL  3-10 mL Intravenous PRN Alice Simmons MD           Assessment/Plan         Diplopia    Cerebral edema (CMS/HCC)    Meningioma (CMS/HCC)    AVF (arteriovenous fistula) (CMS/HCC)    6th nerve palsy, left    Hypercalcemia    Hyperparathyroidism (CMS/HCC)    Morbid obesity (CMS/HCC)    1. Hypercalcemia: Due to primary hyperparathyroidism, now status post parathyroidectomy (S/P left inferior and right superior adenoma resection).  Now has hypocalcemia  2. Hypertension,  controlled  3. Intracranial meningioma with cerebral edema and 6th nerve palsy  4. Severe obesity:  BMI 52     Plan:  1.  One dose of IV calcium gluconate this evening  2.  Increase calcitriol to BID  3.  Increase oral calcium-vitamin D to TID    Juancho العلي MD  03/17/19  7:36 PM

## 2019-03-17 NOTE — PROGRESS NOTES
Name: Joan Almanza ADMIT: 3/4/2019   : 1955  PCP: Juarez Winters MD    MRN: [id number] LOS: 13 days   AGE/SEX: 64 y.o. female  ROOM: [facility]   Subjective   Hypotensive this morning. No CP SOA NVD. Had bowel movement yesterday and improved appetite today.    Objective   Vital Signs  Temp:  [97.5 °F (36.4 °C)-97.8 °F (36.6 °C)] 97.5 °F (36.4 °C)  Heart Rate:  [] 69  Resp:  [18] 18  BP: ()/(53-93) 98/62  SpO2:  [92 %-94 %] 92 %  on   ;   Device (Oxygen Therapy): room air  Body mass index is 50.97 kg/m².    Physical Exam   Constitutional: She is oriented to person, place, and time. She appears well-developed. No distress.   HENT:   Head: Atraumatic.   Nose: Nose normal.   Eyes: Conjunctivae are normal. Pupils are equal, round, and reactive to light.   Neck: Normal range of motion. Neck supple.   Incision cdi   Cardiovascular: Normal rate and intact distal pulses. An irregularly irregular rhythm present.   Pulmonary/Chest: Effort normal. She has no wheezes. She has no rales.   Abdominal: Soft. She exhibits no distension. There is no tenderness. There is no rebound.   Musculoskeletal: Normal range of motion. She exhibits no tenderness.   Neurological: She is alert and oriented to person, place, and time.   Skin: Skin is warm and dry. She is not diaphoretic.   Psychiatric: She has a normal mood and affect. Her behavior is normal.   Nursing note and vitals reviewed.      Results Review:       I reviewed the patient's new clinical results.    Results from last 7 days   Lab Units 19  0540 19  0407 03/15/19  0518 19  0615   WBC 10*3/mm3 27.20* 29.53* 25.15* 22.14*   HEMOGLOBIN g/dL 15.3 16.2* 16.9* 16.7*   PLATELETS 10*3/mm3 199 202 221 213     Results from last 7 days   Lab Units 19  0540 03/15/19  0518 19  0615 19  0904   SODIUM mmol/L 137 134* 137 139   POTASSIUM mmol/L 4.2 4.1 4.4 4.2   CHLORIDE mmol/L 93* 91* 96* 102   CO2 mmol/L 28.9 26.8 28.9 23.5   BUN  mg/dL 33* 35* 24* 21   CREATININE mg/dL 1.14* 1.08* 0.92 0.86   GLUCOSE mg/dL 111* 153* 98 134*   Estimated Creatinine Clearance: 63.4 mL/min (A) (by C-G formula based on SCr of 1.14 mg/dL (H)).  Results from last 7 days   Lab Units 03/17/19  0540 03/16/19  0407 03/15/19  1851 03/15/19  0518  03/14/19  0615  03/11/19  0538   CALCIUM mg/dL 8.2* 10.1 10.3 10.1   < > 11.5*  11.6*   < > 11.6*   ALBUMIN g/dL 3.30*  --   --   --   --   --   --  3.50   MAGNESIUM mg/dL 2.0  --   --   --   --   --   --   --    PHOSPHORUS mg/dL 3.6  --   --  3.3  --  2.4*  --  1.8*    < > = values in this interval not displayed.         calcitriol 0.25 mcg Oral Daily   calcium-vitamin D 1,000 mg Oral Daily   famotidine 20 mg Oral BID   metoprolol tartrate 25 mg Oral Q12H   polyethylene glycol 17 g Oral Daily   sennosides-docusate sodium 2 tablet Oral Nightly   sodium chloride 3 mL Intravenous Q12H      Diet Regular      Assessment/Plan      Active Hospital Problems    Diagnosis  POA   • **Diplopia [H53.2]  Yes   • Morbid obesity (CMS/HCC) [E66.01]  Yes   • Hypercalcemia [E83.52]  Unknown   • Hyperparathyroidism (CMS/HCC) [E21.3]  Yes   • Cerebral edema (CMS/HCC) [G93.6]  Yes   • Meningioma (CMS/HCC) [D32.9]  Yes   • AVF (arteriovenous fistula) (CMS/[facility]) [I77.0]  Yes   • 6th nerve palsy, left [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     - 6th Nerve Palsy: Diplopia unchanged. Steroid. [facility] follow up.  - AV Malformation/Cavernous Sinus Tumor: See above  - Hyperparathyroidism: sp right superior/left inferior parathyroid resections, right inferior/left superior parathyroid biopsy, right thyroid nodule resection 03/13. Surgery following.  - Hypercalcemia: Calcium low this morning, calcitriol held. Repeat labs in morning to monitor for stability. Nephrology following.  - AFib: Metoprolol. Would want [facility] to approve any AC with her AV malformation and tumor. Cardiology following.  - Leukocytosis: No infectious  signs. 2/2 steroid. Monitor.  - HTN: Hypotensive this morning. Stopped lasix, amlodipine, hydralazine. Hold parameters on metoprolol. Monitor.  - Disposition: HH/possibly tomorrow if labs/BP stable.    Juancho Yoder MD  [facility]  03/17/19  10:46 AM

## 2019-03-18 ENCOUNTER — TELEPHONE (OUTPATIENT)
Dept: CARDIOLOGY | Facility: CLINIC | Age: 64
End: 2019-03-18

## 2019-03-18 PROBLEM — K21.9 GERD WITHOUT ESOPHAGITIS: Status: ACTIVE | Noted: 2019-03-18

## 2019-03-18 LAB
ALBUMIN SERPL-MCNC: 3.3 G/DL (ref 3.5–5.2)
ANION GAP SERPL CALCULATED.3IONS-SCNC: 16.3 MMOL/L
BASOPHILS # BLD AUTO: 0.05 10*3/MM3 (ref 0–0.2)
BASOPHILS NFR BLD AUTO: 0.2 % (ref 0–1.5)
BUN BLD-MCNC: 36 MG/DL (ref 8–23)
BUN/CREAT SERPL: 33.6 (ref 7–25)
CA-I BLD-MCNC: 4 MG/DL (ref 4.6–5.4)
CA-I SERPL ISE-MCNC: 1.01 MMOL/L (ref 1.15–1.35)
CALCIUM SPEC-SCNC: 7.5 MG/DL (ref 8.6–10.5)
CHLORIDE SERPL-SCNC: 97 MMOL/L (ref 98–107)
CO2 SERPL-SCNC: 25.7 MMOL/L (ref 22–29)
CREAT BLD-MCNC: 1.07 MG/DL (ref 0.57–1)
DEPRECATED RDW RBC AUTO: 44.9 FL (ref 37–54)
EOSINOPHIL # BLD AUTO: 0.19 10*3/MM3 (ref 0–0.4)
EOSINOPHIL NFR BLD AUTO: 0.7 % (ref 0.3–6.2)
ERYTHROCYTE [DISTWIDTH] IN BLOOD BY AUTOMATED COUNT: 14 % (ref 12.3–15.4)
GFR SERPL CREATININE-BSD FRML MDRD: 52 ML/MIN/1.73
GLUCOSE BLD-MCNC: 124 MG/DL (ref 65–99)
HCT VFR BLD AUTO: 48.5 % (ref 34–46.6)
HGB BLD-MCNC: 16 G/DL (ref 12–15.9)
IMM GRANULOCYTES # BLD AUTO: 0.43 10*3/MM3 (ref 0–0.05)
IMM GRANULOCYTES NFR BLD AUTO: 1.6 % (ref 0–0.5)
LYMPHOCYTES # BLD AUTO: 2.78 10*3/MM3 (ref 0.7–3.1)
LYMPHOCYTES NFR BLD AUTO: 10.6 % (ref 19.6–45.3)
MCH RBC QN AUTO: 28.9 PG (ref 26.6–33)
MCHC RBC AUTO-ENTMCNC: 33 G/DL (ref 31.5–35.7)
MCV RBC AUTO: 87.7 FL (ref 79–97)
MONOCYTES # BLD AUTO: 2.94 10*3/MM3 (ref 0.1–0.9)
MONOCYTES NFR BLD AUTO: 11.2 % (ref 5–12)
NEUTROPHILS # BLD AUTO: 19.89 10*3/MM3 (ref 1.4–7)
NEUTROPHILS NFR BLD AUTO: 75.7 % (ref 42.7–76)
NRBC BLD AUTO-RTO: 0 /100 WBC (ref 0–0)
PHOSPHATE SERPL-MCNC: 3.8 MG/DL (ref 2.5–4.5)
PLATELET # BLD AUTO: 214 10*3/MM3 (ref 140–450)
PMV BLD AUTO: 10.9 FL (ref 6–12)
POTASSIUM BLD-SCNC: 3.8 MMOL/L (ref 3.5–5.2)
RBC # BLD AUTO: 5.53 10*6/MM3 (ref 3.77–5.28)
SODIUM BLD-SCNC: 139 MMOL/L (ref 136–145)
WBC NRBC COR # BLD: 26.28 10*3/MM3 (ref 3.4–10.8)

## 2019-03-18 PROCEDURE — 99024 POSTOP FOLLOW-UP VISIT: CPT | Performed by: SURGERY

## 2019-03-18 PROCEDURE — 99232 SBSQ HOSP IP/OBS MODERATE 35: CPT | Performed by: INTERNAL MEDICINE

## 2019-03-18 PROCEDURE — 80069 RENAL FUNCTION PANEL: CPT | Performed by: INTERNAL MEDICINE

## 2019-03-18 PROCEDURE — 82330 ASSAY OF CALCIUM: CPT | Performed by: INTERNAL MEDICINE

## 2019-03-18 PROCEDURE — 85025 COMPLETE CBC W/AUTO DIFF WBC: CPT | Performed by: INTERNAL MEDICINE

## 2019-03-18 PROCEDURE — 25010000002 ONDANSETRON PER 1 MG: Performed by: INTERNAL MEDICINE

## 2019-03-18 RX ORDER — MECLIZINE HYDROCHLORIDE 25 MG/1
25 TABLET ORAL 3 TIMES DAILY PRN
Status: DISCONTINUED | OUTPATIENT
Start: 2019-03-18 | End: 2019-03-20 | Stop reason: HOSPADM

## 2019-03-18 RX ORDER — CALCIUM CARBONATE 200(500)MG
2 TABLET,CHEWABLE ORAL 3 TIMES DAILY
Status: DISCONTINUED | OUTPATIENT
Start: 2019-03-18 | End: 2019-03-19

## 2019-03-18 RX ORDER — FAMOTIDINE 40 MG/1
40 TABLET, FILM COATED ORAL 2 TIMES DAILY
Status: DISCONTINUED | OUTPATIENT
Start: 2019-03-18 | End: 2019-03-20 | Stop reason: HOSPADM

## 2019-03-18 RX ADMIN — METOPROLOL TARTRATE 25 MG: 25 TABLET ORAL at 10:59

## 2019-03-18 RX ADMIN — CALCITRIOL CAPSULES 0.25 MCG 0.25 MCG: 0.25 CAPSULE ORAL at 10:59

## 2019-03-18 RX ADMIN — ONDANSETRON HYDROCHLORIDE 4 MG: 2 SOLUTION INTRAMUSCULAR; INTRAVENOUS at 09:11

## 2019-03-18 RX ADMIN — SODIUM CHLORIDE, PRESERVATIVE FREE 3 ML: 5 INJECTION INTRAVENOUS at 22:18

## 2019-03-18 RX ADMIN — Medication 2 TABLET: at 22:14

## 2019-03-18 RX ADMIN — METOPROLOL TARTRATE 25 MG: 25 TABLET ORAL at 22:14

## 2019-03-18 RX ADMIN — Medication 2 TABLET: at 16:17

## 2019-03-18 RX ADMIN — Medication 2 TABLET: at 12:43

## 2019-03-18 RX ADMIN — SODIUM CHLORIDE, PRESERVATIVE FREE 3 ML: 5 INJECTION INTRAVENOUS at 08:51

## 2019-03-18 NOTE — PROGRESS NOTES
SURGERY: CHAITANYA  Post op Visit  Joan Almanza  03/18/19     Patient OK for discharge from surgical standpoint once calcium stabilized.  Will need to have clarity around who will prescribe her TUMS (can't tolerate oscal D) and calcitriol and check labs.  Dr Stephens had been managing dosing postoperatively, so i suspect he may want to.    Michelle Reynoso MD  03/18/19

## 2019-03-18 NOTE — PLAN OF CARE
Problem: Patient Care Overview  Goal: Plan of Care Review  Outcome: Ongoing (interventions implemented as appropriate)   03/18/19 1510   Coping/Psychosocial   Plan of Care Reviewed With patient   OTHER   Outcome Summary vss, no falls, no pain, c/o dizziness and nausea, remains in afib, calcium still low, tums tid, continue to monitor   Plan of Care Review   Progress no change     Goal: Individualization and Mutuality  Outcome: Ongoing (interventions implemented as appropriate)      Problem: Skin Injury Risk (Adult)  Goal: Skin Health and Integrity  Outcome: Ongoing (interventions implemented as appropriate)      Problem: Pain, Acute (Adult)  Goal: Acceptable Pain Control/Comfort Level  Outcome: Ongoing (interventions implemented as appropriate)      Problem: Fall Risk (Adult)  Goal: Absence of Fall  Outcome: Ongoing (interventions implemented as appropriate)

## 2019-03-18 NOTE — SIGNIFICANT NOTE
03/18/19 1516   Rehab Treatment   Discipline physical therapy assistant   Reason Treatment Not Performed (Pt to d/c home today)

## 2019-03-18 NOTE — PLAN OF CARE
Problem: Patient Care Overview  Goal: Plan of Care Review  Outcome: Ongoing (interventions implemented as appropriate)   03/18/19 0528   Coping/Psychosocial   Plan of Care Reviewed With patient   OTHER   Outcome Summary B/P stable. Remains in a-fib rate 50's - 70's. Denies c/o's. Calcium low, IV calcium gluconate given. Await am labs.    Plan of Care Review   Progress improving     Goal: Individualization and Mutuality  Outcome: Ongoing (interventions implemented as appropriate)      Problem: Skin Injury Risk (Adult)  Goal: Skin Health and Integrity  Outcome: Ongoing (interventions implemented as appropriate)      Problem: Pain, Acute (Adult)  Goal: Acceptable Pain Control/Comfort Level  Outcome: Ongoing (interventions implemented as appropriate)      Problem: Fall Risk (Adult)  Goal: Identify Related Risk Factors and Signs and Symptoms  Outcome: Ongoing (interventions implemented as appropriate)    Goal: Absence of Fall  Outcome: Ongoing (interventions implemented as appropriate)

## 2019-03-18 NOTE — PROGRESS NOTES
"    Patient Name: Joan Almanza  :1955  64 y.o.      Patient Care Team:  Juarez Winters MD as PCP - General (Family Medicine)    Chief Complaint:  Afib with RVR, diplopia    Interval History:   Comfortable, no CP, hopes to go home today  Echo yesterday:  Interpretation Summary     · The study is technically difficult for diagnosis.  · Left ventricular systolic function is normal. Calculated EF = 49%. Estimated EF was in disagreement with the calculated EF. Estimated EF appears to be in the range of 61 - 65%. Normal left ventricular cavity size noted. All left ventricular wall segments contract normally. Left ventricular wall thickness is consistent with mild concentric hypertrophy. Left ventricular diastolic function was indeterminate.  · Normal left atrial size noted.         Objective   Vital Signs  Temp:  [97.4 °F (36.3 °C)-98.6 °F (37 °C)] 97.7 °F (36.5 °C)  Heart Rate:  [62-89] 73  Resp:  [18] 18  BP: ()/(62-86) 107/79    Intake/Output Summary (Last 24 hours) at 3/18/2019 0803  Last data filed at 3/17/2019 1004  Gross per 24 hour   Intake 210 ml   Output --   Net 210 ml     Flowsheet Rows      First Filed Value   Admission Height  157.5 cm (62\") Documented at 2019 1050   Admission Weight  122 kg (270 lb) Documented at 2019 1050          Physical Exam:   General Appearance:    comfortable, in no acute distress   Lungs:     Clear to auscultation.  Normal respiratory effort and rate.      Heart:    Irregular rhythm and normal rate, normal S1 and S2, no murmurs, gallops or rubs.     Chest Wall:    No abnormalities observed   Abdomen:     Soft, nontender, positive bowel sounds.     Extremities:   no cyanosis, clubbing or edema.  No marked joint deformities.  Adequate musculoskeletal strength.       Results Review:    Results from last 7 days   Lab Units 19  0620   SODIUM mmol/L 139   POTASSIUM mmol/L 3.8   CHLORIDE mmol/L 97*   CO2 mmol/L 25.7   BUN mg/dL 36*   CREATININE mg/dL " 1.07*   GLUCOSE mg/dL 124*   CALCIUM mg/dL 7.5*         Results from last 7 days   Lab Units 03/18/19  0620   WBC 10*3/mm3 26.28*   HEMOGLOBIN g/dL 16.0*   HEMATOCRIT % 48.5*   PLATELETS 10*3/mm3 214         Results from last 7 days   Lab Units 03/17/19  0540   MAGNESIUM mg/dL 2.0                   Medication Review:     calcitriol 0.25 mcg Oral Q12H   calcium-vitamin D 1,000 mg Oral TID   famotidine 20 mg Oral BID   metoprolol tartrate 25 mg Oral Q12H   polyethylene glycol 17 g Oral Daily   sennosides-docusate sodium 2 tablet Oral Nightly   sodium chloride 3 mL Intravenous Q12H             Assessment/Plan     1.  PAF with RVR - on PO metoprolol, remains in afib rate 60s-80s, increases with minimal activity.   Will need to watch for bradycardia which is noted earlier in her admission.  Will defer AC to neuo and surgery as her chads Vasc score  is elevated at (2). This indicates a 2.2% risk of CVA/year. Degree of risk of catastrophic intracranial bleed is 0-7%/year per neurosurgery, risk likely outweighs potential benefit.  2.  History of asymptomatic sinus bradycardia.  We will need to watch with the resumption of AV chaz blocker therapy, so far rate remains appropriate  3.  History of hypercalcemia and hyperparathyroidism, status parathyroidectomy, surgery followiing  4.  Hypertension - Hypotensive this morning, lasix, amlodipine and hydralazine stopped.  Hold parameters on metoprolol per hospitalist  5.  Meningioma - Neuro following  6.  Cranial AV fistula - neuro following     Pt hopes to go home today; OK anytime from our standpoint  Stephane Maldonado MD [facility]  03/18/19  8:03 AM

## 2019-03-18 NOTE — PROGRESS NOTES
Continued Stay Note  [facility]     Patient Name: Joan Almanza  MRN: [id number]  Today's Date: 3/18/2019    Admit Date: 3/4/2019    Discharge Plan     Row Name 03/18/19 1041       Plan    Plan  Home with spouse- following for Med costs    Patient/Family in Agreement with Plan  yes    Plan Comments  CCP consulted for Calcitriol prescription, current dosing in hospital is 0.25mcg q12= for a month (60 capsules) ranges from Verdi[employer] ($20.71) and [employer] ($25.10) on [employer]. Provided info to patient and [employer] card. Spouse to transport home. dipak montelongo/ccp        Discharge Codes    No documentation.             Mendy San, RN

## 2019-03-18 NOTE — PROGRESS NOTES
Name: Joan Almanza ADMIT: 3/4/2019   : 1955  PCP: Juarez Winters MD    MRN: [id number] LOS: 14 days   AGE/SEX: 64 y.o. female  ROOM: [facility]   Subjective   Did not want to take calcium supplements because of indigestion. Having nausea but no vomiting. No CP SOA or diarrhea.    Objective   Vital Signs  Temp:  [97.4 °F (36.3 °C)-98.6 °F (37 °C)] 97.7 °F (36.5 °C)  Heart Rate:  [62-89] 73  Resp:  [18] 18  BP: ()/(62-86) 107/79  SpO2:  [91 %-96 %] 95 %  on   ;   Device (Oxygen Therapy): room air  Body mass index is 50.85 kg/m².    Physical Exam   Constitutional: She is oriented to person, place, and time. She appears well-developed. No distress.   HENT:   Head: Atraumatic.   Nose: Nose normal.   Eyes: Conjunctivae are normal. Pupils are equal, round, and reactive to light.   Neck: Normal range of motion. Neck supple.   Incision cdi   Cardiovascular: Normal rate and intact distal pulses. An irregularly irregular rhythm present.   Pulmonary/Chest: Effort normal. She has no wheezes. She has no rales.   Abdominal: Soft. She exhibits no distension. There is no tenderness. There is no rebound.   Musculoskeletal: Normal range of motion. She exhibits no tenderness.   Neurological: She is alert and oriented to person, place, and time.   Skin: Skin is warm and dry. She is not diaphoretic.   Psychiatric: She has a normal mood and affect. Her behavior is normal.   Nursing note and vitals reviewed.      Results Review:       I reviewed the patient's new clinical results.     I reviewed telemetry/EKG results, afib, rate ok.    Results from last 7 days   Lab Units 19  0620 19  0540 19  0407 03/15/19  0518   WBC 10*3/mm3 26.28* 27.20* 29.53* 25.15*   HEMOGLOBIN g/dL 16.0* 15.3 16.2* 16.9*   PLATELETS 10*3/mm3 214 199 202 221     Results from last 7 days   Lab Units 19  0620 19  0540 03/15/19  0518 19  0615   SODIUM mmol/L 139 137 134* 137   POTASSIUM mmol/L 3.8 4.2 4.1 4.4    CHLORIDE mmol/L 97* 93* 91* 96*   CO2 mmol/L 25.7 28.9 26.8 28.9   BUN mg/dL 36* 33* 35* 24*   CREATININE mg/dL 1.07* 1.14* 1.08* 0.92   GLUCOSE mg/dL 124* 111* 153* 98   Estimated Creatinine Clearance: 67.5 mL/min (A) (by C-G formula based on SCr of 1.07 mg/dL (H)).  Results from last 7 days   Lab Units 03/18/19  0620 03/17/19  0540 03/16/19  0407 03/15/19  1851 03/15/19  0518  03/14/19  0615   CALCIUM mg/dL 7.5* 8.2* 10.1 10.3 10.1   < > 11.5*  11.6*   ALBUMIN g/dL 3.30* 3.30*  --   --   --   --   --    MAGNESIUM mg/dL  --  2.0  --   --   --   --   --    PHOSPHORUS mg/dL 3.8 3.6  --   --  3.3  --  2.4*    < > = values in this interval not displayed.         calcitriol 0.25 mcg Oral Q12H   calcium-vitamin D 1,000 mg Oral TID   famotidine 20 mg Oral BID   metoprolol tartrate 25 mg Oral Q12H   polyethylene glycol 17 g Oral Daily   sennosides-docusate sodium 2 tablet Oral Nightly   sodium chloride 3 mL Intravenous Q12H      Diet Regular      Assessment/Plan      Active Hospital Problems    Diagnosis  POA   • **Diplopia [H53.2]  Yes   • Morbid obesity (CMS/HCC) [E66.01]  Yes   • Hypercalcemia [E83.52]  Unknown   • Hyperparathyroidism (CMS/HCC) [E21.3]  Yes   • Cerebral edema (CMS/HCC) [G93.6]  Yes   • Meningioma (CMS/HCC) [D32.9]  Yes   • AVF (arteriovenous fistula) (CMS/HCC) [I77.0]  Yes   • 6th nerve palsy, left [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     - 6th Nerve Palsy: Diplopia unchanged. Steroid. [facility]MA follow up.  - AV Malformation/Cavernous Sinus Tumor: See above  - Hyperparathyroidism: sp right superior/left inferior parathyroid resections, right inferior/left superior parathyroid biopsy, right thyroid nodule resection 03/13. Surgery following.  - Hypercalcemia: Calcium still low this morning. Planned change to calcium carbonate for supplementation due to GERD. Nephrology following.  - AFib: Metoprolol. Would want ALMA to approve any AC with her AV malformation and  tumor. Cardiology following.  - Leukocytosis: No infectious signs. 2/2 steroid. Monitor.  - HTN: Hypotension resolved. Continue Metoprolol.  - GERD: Increase famotidine dose. Monitor with tums as above. Could consider carafate if still having issues.  - Disposition: HH/possibly tomorrow if labs/BP stable.    Discussed with Dr Gallegos.  >35 minutes time spent    Juancho Yoder MD  [facility]  03/18/19  10:09 AM

## 2019-03-18 NOTE — PROGRESS NOTES
"   LOS: 14 days    Patient Care Team:  Juarez Winters MD as PCP - General (Family Medicine)    Chief Complaint:    Chief Complaint   Patient presents with   • Headache   • Eye Pain   • Diplopia     Follow UP Hypercalcemia  Subjective     Interval History:   Patient is complaining of nausea associated with the calcium supplement she is getting, she has significant heartburn, no chest pain or shortness of air, no diarrhea, no abdominal pain, no dysuria or gross hematuria, no lower extremity edema, no lightheadedness.      Objective     Vital Signs  Temp:  [97.4 °F (36.3 °C)-98.6 °F (37 °C)] 97.7 °F (36.5 °C)  Heart Rate:  [62-89] 73  Resp:  [18] 18  BP: ()/(62-86) 107/79    Flowsheet Rows      First Filed Value   Admission Height  157.5 cm (62\") Documented at 03/04/2019 1050   Admission Weight  122 kg (270 lb) Documented at 03/04/2019 1050          No intake/output data recorded.  I/O last 3 completed shifts:  In: 640 [P.O.:390; IV Piggyback:250]  Out: -   No intake or output data in the 24 hours ending 03/18/19 1050    Physical Exam:  Exam:  General Appearance: alert, oriented x 3, no acute distress, obese  Skin: warm and dry  HEENT: Nonicteric sclerae, oral mucosa normal,   Neck: supple, no JVD, trachea midline, surgical incision is healing well  Lungs: CTA, unlabored breathing effort  Heart: RRR, normal S1 and S2, no S3, no rub  Abdomen: soft, non-tender,  present bowel sounds to auscultation  : no palpable bladder,  Extremities: no edema, cyanosis or clubbing  Neuro: normal speech and mental status         Results Review:    Results from last 7 days   Lab Units 03/18/19  0620 03/17/19  0540 03/16/19  0407  03/15/19  0518   SODIUM mmol/L 139 137  --   --  134*   POTASSIUM mmol/L 3.8 4.2  --   --  4.1   CHLORIDE mmol/L 97* 93*  --   --  91*   CO2 mmol/L 25.7 28.9  --   --  26.8   BUN mg/dL 36* 33*  --   --  35*   CREATININE mg/dL 1.07* 1.14*  --   --  1.08*   CALCIUM mg/dL 7.5* 8.2* 10.1   < > 10.1 "   GLUCOSE mg/dL 124* 111*  --   --  153*    < > = values in this interval not displayed.       Estimated Creatinine Clearance: 67.5 mL/min (A) (by C-G formula based on SCr of 1.07 mg/dL (H)).    Results from last 7 days   Lab Units 03/18/19  0620 03/17/19  0540 03/15/19  0518   MAGNESIUM mg/dL  --  2.0  --    PHOSPHORUS mg/dL 3.8 3.6 3.3             Results from last 7 days   Lab Units 03/18/19  0620 03/17/19  0540 03/16/19  0407 03/15/19  0518 03/14/19  0615   WBC 10*3/mm3 26.28* 27.20* 29.53* 25.15* 22.14*   HEMOGLOBIN g/dL 16.0* 15.3 16.2* 16.9* 16.7*   PLATELETS 10*3/mm3 214 199 202 221 213               Imaging Results (last 24 hours)     ** No results found for the last 24 hours. **          calcitriol 0.25 mcg Oral Q12H   calcium carbonate 2 tablet Oral TID   famotidine 40 mg Oral BID   metoprolol tartrate 25 mg Oral Q12H   polyethylene glycol 17 g Oral Daily   sennosides-docusate sodium 2 tablet Oral Nightly   sodium chloride 3 mL Intravenous Q12H          Medication Review:   Current Facility-Administered Medications   Medication Dose Route Frequency Provider Last Rate Last Dose   • acetaminophen (TYLENOL) tablet 650 mg  650 mg Oral Q4H PRN Michelle Reynoso MD        Or   • acetaminophen (TYLENOL) 160 MG/5ML solution 650 mg  650 mg Oral Q4H PRN Michelle Reynoso MD        Or   • acetaminophen (TYLENOL) suppository 650 mg  650 mg Rectal Q4H PRN Michelle Reynoso MD       • bisacodyl (DULCOLAX) suppository 10 mg  10 mg Rectal Daily PRN Juancho Yoder MD   10 mg at 03/16/19 1227   • calcitriol (ROCALTROL) capsule 0.25 mcg  0.25 mcg Oral Q12H Juancho العلي MD   0.25 mcg at 03/17/19 2131   • calcium carbonate (TUMS) chewable tablet 500 mg (200 mg elemental)  2 tablet Oral TID Ranjith Gallegos MD       • famotidine (PEPCID) tablet 40 mg  40 mg Oral BID Juancho Yoder MD       • hydrALAZINE (APRESOLINE) injection 10 mg  10 mg Intravenous Q6H PRN Carol Ann Johnson, APRN       •  HYDROcodone-acetaminophen (NORCO)  MG per tablet 1 tablet  1 tablet Oral Q4H PRN Michelle Reynoso MD   1 tablet at 03/13/19 1730   • HYDROcodone-acetaminophen (NORCO) 5-325 MG per tablet 1 tablet  1 tablet Oral Q4H PRN Michelle Reynoso MD   1 tablet at 03/15/19 0639   • HYDROmorphone (DILAUDID) injection 0.5 mg  0.5 mg Intravenous Q2H PRN Michelle Reynoso MD        And   • naloxone (NARCAN) injection 0.1 mg  0.1 mg Intravenous Q5 Min PRN Michelle Reynoso MD       • metoprolol tartrate (LOPRESSOR) tablet 25 mg  25 mg Oral Q12H Juancho Yoder MD   25 mg at 03/17/19 2131   • morphine injection 4 mg  4 mg Intravenous Q2H PRN Michelle Reynoso MD        And   • naloxone (NARCAN) injection 0.4 mg  0.4 mg Intravenous Q5 Min PRN Michelle Reynoso MD       • nitroglycerin (NITROSTAT) SL tablet 0.4 mg  0.4 mg Sublingual Q5 Min PRN Alice Simmons MD       • ondansetron (ZOFRAN) injection 4 mg  4 mg Intravenous Q6H PRN Alice Simmons MD   4 mg at 03/18/19 0911   • polyethylene glycol 3350 powder (packet)  17 g Oral Daily Juancho Yoder MD   17 g at 03/17/19 0851   • sennosides-docusate sodium (SENOKOT-S) 8.6-50 MG tablet 2 tablet  2 tablet Oral Nightly Abel Lee MD   2 tablet at 03/17/19 2131   • sodium chloride 0.9 % flush 10 mL  10 mL Intravenous PRN Bull Branch II, MD       • sodium chloride 0.9 % flush 3 mL  3 mL Intravenous Q12H Alice Simmons MD   3 mL at 03/18/19 0851   • sodium chloride 0.9 % flush 3-10 mL  3-10 mL Intravenous PRN Alice Simmons MD           Assessment/Plan   1. Hypercalcemia: Due to primary hyperparathyroidism, now status post parathyroidectomy (S/P left inferior and right superior adenoma resection).  Now has hypocalcemia, calcium is 7.5, patient having difficulties with current calcium supplement.  2. Hypertension, controlled  3. Intracranial meningioma with cerebral edema and 6th nerve palsy  4. Severe obesity:  BMI 52  5.  Severe GERD     Plan:  1.  Change to Tums, (calcium  carbonate), 2 3 times daily  2.  Surveillance lab    Ranjith Gallegos MD  03/18/19  10:50 AM

## 2019-03-19 ENCOUNTER — APPOINTMENT (OUTPATIENT)
Dept: CT IMAGING | Facility: HOSPITAL | Age: 64
End: 2019-03-19

## 2019-03-19 LAB
ALBUMIN SERPL-MCNC: 3.2 G/DL (ref 3.5–5.2)
ANION GAP SERPL CALCULATED.3IONS-SCNC: 14.8 MMOL/L
BUN BLD-MCNC: 30 MG/DL (ref 8–23)
BUN/CREAT SERPL: 28.6 (ref 7–25)
CA-I BLD-MCNC: 3.7 MG/DL (ref 4.6–5.4)
CA-I SERPL ISE-MCNC: 0.93 MMOL/L (ref 1.15–1.35)
CALCIUM SPEC-SCNC: 7.2 MG/DL (ref 8.6–10.5)
CHLORIDE SERPL-SCNC: 96 MMOL/L (ref 98–107)
CO2 SERPL-SCNC: 26.2 MMOL/L (ref 22–29)
CREAT BLD-MCNC: 1.05 MG/DL (ref 0.57–1)
DEPRECATED RDW RBC AUTO: 44.4 FL (ref 37–54)
ERYTHROCYTE [DISTWIDTH] IN BLOOD BY AUTOMATED COUNT: 13.7 % (ref 12.3–15.4)
GFR SERPL CREATININE-BSD FRML MDRD: 53 ML/MIN/1.73
GLUCOSE BLD-MCNC: 105 MG/DL (ref 65–99)
HCT VFR BLD AUTO: 46.2 % (ref 34–46.6)
HGB BLD-MCNC: 15.6 G/DL (ref 12–15.9)
MAGNESIUM SERPL-MCNC: 1.9 MG/DL (ref 1.6–2.4)
MCH RBC QN AUTO: 29.8 PG (ref 26.6–33)
MCHC RBC AUTO-ENTMCNC: 33.8 G/DL (ref 31.5–35.7)
MCV RBC AUTO: 88.3 FL (ref 79–97)
PHOSPHATE SERPL-MCNC: 4.5 MG/DL (ref 2.5–4.5)
PLATELET # BLD AUTO: 188 10*3/MM3 (ref 140–450)
PMV BLD AUTO: 10.9 FL (ref 6–12)
POTASSIUM BLD-SCNC: 4 MMOL/L (ref 3.5–5.2)
RBC # BLD AUTO: 5.23 10*6/MM3 (ref 3.77–5.28)
SODIUM BLD-SCNC: 137 MMOL/L (ref 136–145)
WBC NRBC COR # BLD: 20.28 10*3/MM3 (ref 3.4–10.8)

## 2019-03-19 PROCEDURE — 70450 CT HEAD/BRAIN W/O DYE: CPT

## 2019-03-19 PROCEDURE — 99232 SBSQ HOSP IP/OBS MODERATE 35: CPT | Performed by: NURSE PRACTITIONER

## 2019-03-19 PROCEDURE — 83735 ASSAY OF MAGNESIUM: CPT | Performed by: INTERNAL MEDICINE

## 2019-03-19 PROCEDURE — 85027 COMPLETE CBC AUTOMATED: CPT | Performed by: INTERNAL MEDICINE

## 2019-03-19 PROCEDURE — 80069 RENAL FUNCTION PANEL: CPT | Performed by: INTERNAL MEDICINE

## 2019-03-19 PROCEDURE — 99231 SBSQ HOSP IP/OBS SF/LOW 25: CPT | Performed by: NURSE PRACTITIONER

## 2019-03-19 PROCEDURE — 82330 ASSAY OF CALCIUM: CPT | Performed by: INTERNAL MEDICINE

## 2019-03-19 RX ORDER — CALCIUM CARBONATE 200(500)MG
3 TABLET,CHEWABLE ORAL 3 TIMES DAILY
Status: DISCONTINUED | OUTPATIENT
Start: 2019-03-19 | End: 2019-03-20

## 2019-03-19 RX ORDER — LORAZEPAM 0.5 MG/1
0.5 TABLET ORAL EVERY 6 HOURS PRN
Status: DISCONTINUED | OUTPATIENT
Start: 2019-03-19 | End: 2019-03-20 | Stop reason: HOSPADM

## 2019-03-19 RX ADMIN — METOPROLOL TARTRATE 25 MG: 25 TABLET ORAL at 09:11

## 2019-03-19 RX ADMIN — CALCITRIOL CAPSULES 0.25 MCG 0.25 MCG: 0.25 CAPSULE ORAL at 01:02

## 2019-03-19 RX ADMIN — SENNOSIDES AND DOCUSATE SODIUM 2 TABLET: 8.6; 5 TABLET ORAL at 21:44

## 2019-03-19 RX ADMIN — CALCITRIOL CAPSULES 0.25 MCG 0.25 MCG: 0.25 CAPSULE ORAL at 09:11

## 2019-03-19 RX ADMIN — Medication 3 TABLET: at 21:44

## 2019-03-19 RX ADMIN — SODIUM CHLORIDE, PRESERVATIVE FREE 3 ML: 5 INJECTION INTRAVENOUS at 22:00

## 2019-03-19 RX ADMIN — Medication 2 TABLET: at 15:14

## 2019-03-19 RX ADMIN — FAMOTIDINE 40 MG: 40 TABLET, FILM COATED ORAL at 21:44

## 2019-03-19 RX ADMIN — SODIUM CHLORIDE, PRESERVATIVE FREE 3 ML: 5 INJECTION INTRAVENOUS at 09:20

## 2019-03-19 RX ADMIN — METOPROLOL TARTRATE 25 MG: 25 TABLET ORAL at 21:44

## 2019-03-19 RX ADMIN — CALCITRIOL CAPSULES 0.25 MCG 0.25 MCG: 0.25 CAPSULE ORAL at 21:45

## 2019-03-19 RX ADMIN — FAMOTIDINE 40 MG: 40 TABLET, FILM COATED ORAL at 09:11

## 2019-03-19 RX ADMIN — Medication 2 TABLET: at 09:11

## 2019-03-19 NOTE — PLAN OF CARE
Problem: Patient Care Overview  Goal: Plan of Care Review  Outcome: Ongoing (interventions implemented as appropriate)   03/19/19 0418   Coping/Psychosocial   Plan of Care Reviewed With patient   OTHER   Outcome Summary Up in chair @ shift change/active with P.T.no falls/gait steady/up in room//On po Tums & Calcitriol(can't tolerate oyster shell calcium)Hopes to be D/C 'd in am if calcium level acceptable to MD/Will continue to monitor    Plan of Care Review   Progress improving       Problem: Skin Injury Risk (Adult)  Goal: Skin Health and Integrity  Outcome: Ongoing (interventions implemented as appropriate)      Problem: Pain, Acute (Adult)  Goal: Acceptable Pain Control/Comfort Level  Outcome: Ongoing (interventions implemented as appropriate)      Problem: Fall Risk (Adult)  Goal: Identify Related Risk Factors and Signs and Symptoms  Outcome: Outcome(s) achieved Date Met: 03/19/19    Goal: Absence of Fall  Outcome: Ongoing (interventions implemented as appropriate)

## 2019-03-19 NOTE — PROGRESS NOTES
Name: Joan Almanza ADMIT: 3/4/2019   : 1955  PCP: Juarez Winters MD    MRN: [id number] LOS: 15 days   AGE/SEX: 64 y.o. female  ROOM: [facility]   Subjective   No CP SOA NVD. She had milateral new onset of facial paresthesia this morning. Ordered repeat CT head. She reports that has since improved. She is quite anxious at times mostly centering around her brain meningioma. Discussed calcium and neurologic manifestations.    Objective   Vital Signs  Temp:  [97.3 °F (36.3 °C)-98.2 °F (36.8 °C)] 98.2 °F (36.8 °C)  Heart Rate:  [77-91] 91  Resp:  [18] 18  BP: ()/(67-74) 108/67  SpO2:  [93 %-96 %] 96 %  on   ;   Device (Oxygen Therapy): room air  Body mass index is 49.23 kg/m².    Physical Exam   Constitutional: She is oriented to person, place, and time. She appears well-developed. No distress.   HENT:   Head: Atraumatic.   Nose: Nose normal.   Eyes: Conjunctivae are normal. Pupils are equal, round, and reactive to light.   Neck: Normal range of motion. Neck supple.   Incision cdi   Cardiovascular: Normal rate and intact distal pulses. An irregularly irregular rhythm present.   Pulmonary/Chest: Effort normal. She has no wheezes. She has no rales.   Abdominal: Soft. She exhibits no distension. There is no tenderness. There is no rebound.   Musculoskeletal: Normal range of motion. She exhibits no tenderness.   Neurological: She is alert and oriented to person, place, and time.   Skin: Skin is warm and dry. She is not diaphoretic.   Psychiatric: She has a normal mood and affect. Her behavior is normal.   Nursing note and vitals reviewed.      Results Review:       I reviewed the patient's new clinical results.     I reviewed imaging, agree with interpretation.      Results from last 7 days   Lab Units 19  0555 19  0620 19  0540 19  0407   WBC 10*3/mm3 20.28* 26.28* 27.20* 29.53*   HEMOGLOBIN g/dL 15.6 16.0* 15.3 16.2*   PLATELETS 10*3/mm3 188 214 199 202     Results from last 7  days   Lab Units 03/19/19  0555 03/18/19  0620 03/17/19  0540 03/15/19  0518   SODIUM mmol/L 137 139 137 134*   POTASSIUM mmol/L 4.0 3.8 4.2 4.1   CHLORIDE mmol/L 96* 97* 93* 91*   CO2 mmol/L 26.2 25.7 28.9 26.8   BUN mg/dL 30* 36* 33* 35*   CREATININE mg/dL 1.05* 1.07* 1.14* 1.08*   GLUCOSE mg/dL 105* 124* 111* 153*   Estimated Creatinine Clearance: 67.4 mL/min (A) (by C-G formula based on SCr of 1.05 mg/dL (H)).  Results from last 7 days   Lab Units 03/19/19  0555 03/18/19  0620 03/17/19  0540 03/16/19  0407  03/15/19  0518   CALCIUM mg/dL 7.2* 7.5* 8.2* 10.1   < > 10.1   ALBUMIN g/dL 3.20* 3.30* 3.30*  --   --   --    MAGNESIUM mg/dL 1.9  --  2.0  --   --   --    PHOSPHORUS mg/dL 4.5 3.8 3.6  --   --  3.3    < > = values in this interval not displayed.         calcitriol 0.25 mcg Oral Q12H   calcium carbonate 3 tablet Oral TID   famotidine 40 mg Oral BID   metoprolol tartrate 25 mg Oral Q12H   polyethylene glycol 17 g Oral Daily   sennosides-docusate sodium 2 tablet Oral Nightly   sodium chloride 3 mL Intravenous Q12H      Diet Regular      Assessment/Plan      Active Hospital Problems    Diagnosis  POA   • **Diplopia [H53.2]  Yes   • GERD without esophagitis [K21.9]  Yes   • Morbid obesity (CMS/HCC) [E66.01]  Yes   • Hypercalcemia [E83.52]  Yes   • Hyperparathyroidism (CMS/HCC) [E21.3]  Yes   • Cerebral edema (CMS/HCC) [G93.6]  Yes   • Meningioma (CMS/HCC) [D32.9]  Yes   • AVF (arteriovenous fistula) (CMS/HCC) [I77.0]  Yes   • 6th nerve palsy, left [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     - 6th Nerve Palsy: Diplopia unchanged. Repeat CT head unchanged. ALMA follow up.  - AV Malformation/Cavernous Sinus Tumor: See above  - Hyperparathyroidism: sp right superior/left inferior parathyroid resections, right inferior/left superior parathyroid biopsy, right thyroid nodule resection 03/13. Surgery following.  - Hypercalcemia: Calcium still low this morning. Neurologic  complication present with paresthesia. Calcium Carbonate, Calcitriol. Nephrology following.  - AFib: Metoprolol. No ac planned due to bleed risk. Cardiology following.  - Leukocytosis: Improving slowly off steroid. Monitor.  - HTN: Continue Metoprolol.  - GERD: Famotidine  - Disposition: HH/possibly tomorrow depending on calcium level    Juancho Yoder MD  [facility]  03/19/19  5:28 PM

## 2019-03-19 NOTE — PROGRESS NOTES
"    Patient Name: Joan Almanza  :1955  64 y.o.      Patient Care Team:  Juarez Winters MD as PCP - General (Family Medicine)    Chief Complaint: follow up AF with RVR    Interval History: She feels terrible today. She has facial numbness. She feels her arms are about to explode. She still has diplopia. She denies chest pain, shortness of breath.        Objective   Vital Signs  Temp:  [97.3 °F (36.3 °C)-98.1 °F (36.7 °C)] 98.1 °F (36.7 °C)  Heart Rate:  [74-81] 81  Resp:  [18] 18  BP: ()/(67-83) 104/74    Intake/Output Summary (Last 24 hours) at 3/19/2019 1319  Last data filed at 3/18/2019 2220  Gross per 24 hour   Intake 390 ml   Output --   Net 390 ml     Flowsheet Rows      First Filed Value   Admission Height  157.5 cm (62\") Documented at 2019 1050   Admission Weight  122 kg (270 lb) Documented at 2019 1050          Physical Exam:   General Appearance:    Alert, cooperative, in no acute distress   Lungs:     Clear to auscultation.  Normal respiratory effort and rate.      Heart:    irregular rhythm and normal rate, normal S1 and S2, no murmurs, gallops or rubs.     Chest Wall:    No abnormalities observed   Abdomen:     Soft, nontender, positive bowel sounds.     Extremities:   no cyanosis, clubbing or edema.  No marked joint deformities.  Adequate musculoskeletal strength.       Results Review:    Results from last 7 days   Lab Units 19  0555   SODIUM mmol/L 137   POTASSIUM mmol/L 4.0   CHLORIDE mmol/L 96*   CO2 mmol/L 26.2   BUN mg/dL 30*   CREATININE mg/dL 1.05*   GLUCOSE mg/dL 105*   CALCIUM mg/dL 7.2*         Results from last 7 days   Lab Units 19  0555   WBC 10*3/mm3 20.28*   HEMOGLOBIN g/dL 15.6   HEMATOCRIT % 46.2   PLATELETS 10*3/mm3 188         Results from last 7 days   Lab Units 19  0555   MAGNESIUM mg/dL 1.9                   Medication Review:     calcitriol 0.25 mcg Oral Q12H   calcium carbonate 2 tablet Oral TID   famotidine 40 mg Oral BID "   metoprolol tartrate 25 mg Oral Q12H   polyethylene glycol 17 g Oral Daily   sennosides-docusate sodium 2 tablet Oral Nightly   sodium chloride 3 mL Intravenous Q12H             Assessment/Plan   1.  PAF with RVR - on PO metoprolol, remains in afib rate 60s-80s, increases with minimal activity.   Will need to watch for bradycardia which is noted earlier in her admission.  Chads Vasc score  is elevated at (2). This indicates a 2.2% risk of CVA/year. Degree of risk of catastrophic intracranial bleed is 0-7%/year per neurosurgery, risk likely outweighs potential benefit.  2.  History of asymptomatic sinus bradycardia.  We will need to watch with the resumption of AV chaz blocker therapy, so far rate remains appropriate  3.  History of hypercalcemia and hyperparathyroidism, status parathyroidectomy, surgery followiing  4.  Hypertension - Hypotensive this morning, lasix, amlodipine and hydralazine stopped.  Hold parameters on metoprolol per hospitalist  5.  Meningioma - Neuro following  6.  Cranial AV fistula - neuro following    No AC for now. She will need to follow up with us as an outpatient. No bradycardia noted on beta blocker thus far. Continue current regimen.     MILA Pool  [facility]  03/19/19  1:19 PM

## 2019-03-19 NOTE — PROGRESS NOTES
[facility] PROGRESS NOTE    PATIENT IDENTIFICATION:   Name:  Joan Almanza      MRN:  [id number]     64 y.o.  female               CC: facial numbness; AVF and cranial fossa mass      Subjective     Interval History: Since last encounter, patient has had new complaints.  Called by nursing staff secondary to patient complaints of bilateral facial numbness as well as arm numbness.  She states it started this morning after she woke up.  It persist at this time.  No associated visual changes, weakness, speech changes.  No neck or arm pain.  She continues with diplopia that did not improve with steroids trialed at the beginning of her stay.  She is s/p parathyroidectomy. She is now having issues with hypocalcemia. Nursing states she is very anxious. She tells me she is tired of being in hospital and wants to go home. She is not on AC; still in A-fib.    ROS:  HEENT: No headache; diplopia,   Neuro: facial and arm numbness, no weakness, arm pain, no speech difficulties, no balance difficulties    Objective     Vital signs in last 24 hours:  Temp:  [97.3 °F (36.3 °C)-98.1 °F (36.7 °C)] 98.1 °F (36.7 °C)  Heart Rate:  [68-81] 81  Resp:  [18] 18  BP: ()/(67-83) 104/74      Intake/Output this shift:  No intake/output data recorded.      Intake/Output last 3 shifts:  I/O last 3 completed shifts:  In: 390 [P.O.:390]  Out: -     LABS:  Results from last 7 days   Lab Units 03/19/19  0555 03/18/19  0620 03/17/19  0540   WBC 10*3/mm3 20.28* 26.28* 27.20*   HEMOGLOBIN g/dL 15.6 16.0* 15.3   HEMATOCRIT % 46.2 48.5* 46.5   PLATELETS 10*3/mm3 188 214 199         Results from last 7 days   Lab Units 03/19/19  0555 03/18/19  0620 03/17/19  0540   SODIUM mmol/L 137 139 137   POTASSIUM mmol/L 4.0 3.8 4.2   CHLORIDE mmol/L 96* 97* 93*   CO2 mmol/L 26.2 25.7 28.9   BUN mg/dL 30* 36* 33*   CREATININE mg/dL 1.05* 1.07* 1.14*   CALCIUM mg/dL 7.2* 7.5* 8.2*   GLUCOSE mg/dL 105* 124* 111*       IMAGING STUDIES:  [facility]-  no acute abnormalities- visible right temporal/middle cranial fossa mass    I personally viewed and interpreted the patient's chart.    Meds reviewed/changed: Yes    Current Facility-Administered Medications:   •  acetaminophen (TYLENOL) tablet 650 mg, 650 mg, Oral, Q4H PRN **OR** acetaminophen (TYLENOL) 160 MG/5ML solution 650 mg, 650 mg, Oral, Q4H PRN **OR** acetaminophen (TYLENOL) suppository 650 mg, 650 mg, Rectal, Q4H PRN, Michelle Reynoso MD  •  bisacodyl (DULCOLAX) suppository 10 mg, 10 mg, Rectal, Daily PRN, Juancho Yoder MD, 10 mg at 03/16/19 1227  •  calcitriol (ROCALTROL) capsule 0.25 mcg, 0.25 mcg, Oral, Q12H, Juancho العلي MD, 0.25 mcg at 03/19/19 0911  •  calcium carbonate (TUMS) chewable tablet 500 mg (200 mg elemental), 2 tablet, Oral, TID, Ranjith Gallegos MD, 2 tablet at 03/19/19 0911  •  famotidine (PEPCID) tablet 40 mg, 40 mg, Oral, BID, Juancho Yoder MD, 40 mg at 03/19/19 0911  •  hydrALAZINE (APRESOLINE) injection 10 mg, 10 mg, Intravenous, Q6H PRN, Carol Ann Johnson, APRN  •  HYDROcodone-acetaminophen (NORCO)  MG per tablet 1 tablet, 1 tablet, Oral, Q4H PRN, Michelle Reynoso MD, 1 tablet at 03/13/19 1730  •  HYDROcodone-acetaminophen (NORCO) 5-325 MG per tablet 1 tablet, 1 tablet, Oral, Q4H PRN, Michelle Reynoso MD, 1 tablet at 03/15/19 0639  •  HYDROmorphone (DILAUDID) injection 0.5 mg, 0.5 mg, Intravenous, Q2H PRN **AND** naloxone (NARCAN) injection 0.1 mg, 0.1 mg, Intravenous, Q5 Min PRN, Michelle Reynoso MD  •  LORazepam (ATIVAN) tablet 0.5 mg, 0.5 mg, Oral, Q6H PRN, Juancho Yoder MD  •  meclizine (ANTIVERT) tablet 25 mg, 25 mg, Oral, TID PRN, Juancho Yoder MD  •  metoprolol tartrate (LOPRESSOR) tablet 25 mg, 25 mg, Oral, Q12H, Juancho Yoder MD, 25 mg at 03/19/19 0911  •  morphine injection 4 mg, 4 mg, Intravenous, Q2H PRN **AND** naloxone (NARCAN) injection 0.4 mg, 0.4 mg, Intravenous, Q5 Min PRN, Michelle Reynoso MD  •  nitroglycerin  (NITROSTAT) SL tablet 0.4 mg, 0.4 mg, Sublingual, Q5 Min PRN, Alice Simmons MD  •  ondansetron (ZOFRAN) injection 4 mg, 4 mg, Intravenous, Q6H PRN, Alice Simmons MD, 4 mg at 03/18/19 0911  •  polyethylene glycol 3350 powder (packet), 17 g, Oral, Daily, Juancho Yoder MD, 17 g at 03/17/19 0851  •  sennosides-docusate sodium (SENOKOT-S) 8.6-50 MG tablet 2 tablet, 2 tablet, Oral, Nightly, Abel Lee MD, 2 tablet at 03/17/19 2131  •  [COMPLETED] Insert peripheral IV, , , Once **AND** sodium chloride 0.9 % flush 10 mL, 10 mL, Intravenous, PRN, Bull Branch II, MD  •  sodium chloride 0.9 % flush 3 mL, 3 mL, Intravenous, Q12H, TroyAlice stanley MD, 3 mL at 03/19/19 0920  •  sodium chloride 0.9 % flush 3-10 mL, 3-10 mL, Intravenous, PRN, Alice Simmons MD      Physical Exam:    General:   Awake, alert, oriented x3. Speech clear with     no aphasia  CN II-XII: Intact except as listed here; Slight reduction in LR movement bilaterally; has constant diplopia; specificall has Normal facial sensation (even to pinprick- able to distinguish from LT)   Sensory; Intact ruby UE/LE- able to distinguish PP from LT  Motor: No drift ruby UE/LE   Coordination: Finger to nose shows no dysmetria.     NIHSS= 0    Assessment/Plan     ASSESSMENT:      Diplopia    Cerebral edema (CMS/HCC)    Meningioma (CMS/HCC)    AVF (arteriovenous fistula) (CMS/HCC)    6th nerve palsy, left    Hypercalcemia    Hyperparathyroidism (CMS/HCC)    Morbid obesity (CMS/HCC)    GERD without esophagitis      PLAN: Contacted regarding concerns of bilateral facial and arm numbness.  This began this morning and persist.  Her exam is unremarkable.  She has normal cranial nerve function except for the known diplopia/cranial nerve VI palsy.  She has normal movements and sensation bilateral upper and lower extremities to light touch and pinprick.  CT scan shows no acute abnormalities.  I do not think that there is any indication for MRI at this time as she has  nonfocal complaints.  However, she is in A. fib and the question regarding anticoagulation was addressed on March 17 in a note from Dr. Bhakta.  Please see this note for his recommendations.  I discussed with the nurse patient's exam findings.  She states that the calcium level continues to be persistently low.  Additionally, she has quite a bit of anxiety.  I suspect metabolic reason for her sensory changes with possible overlay of psychogenic concerns.  If she has focal symptomatic complaints, I recommend that a rapid response be initiated.  I discussed this with the nursing staff. We will remain available if needed but no plans to follow up while inpatient.    I discussed the patients findings and my recommendations with patient, nursing staff and Dr. Bhakta       LOS: 15 days       Elisabet Skinner, APRN  3/19/2019  12:05 PM    Dragon disclaimer:   Much of this encounter note is an electronic transcription/translation of spoken language to printed text. The electronic translation of spoken language may permit erroneous, or at times, nonsensical words or phrases to be inadvertently transcribed; Although I have reviewed the note for such errors, some may still exist.

## 2019-03-19 NOTE — NURSING NOTE
"Pt very anxious this morning, stated \"the Doctors aren't doing anything for me, they are leaving me here to die\". New numbness on face and all extremities, Dr. Yoder ordered head CT and re-consulted Neurosurgery.   "

## 2019-03-19 NOTE — PROGRESS NOTES
"   LOS: 15 days    Patient Care Team:  Juarez Winters MD as PCP - General (Family Medicine)    Chief Complaint:    Chief Complaint   Patient presents with   • Headache   • Eye Pain   • Diplopia     Follow UP Hypercalcemia  Subjective     Interval History:   Still with some florina-oral numbness this am.   Eating. Bowels moving. Axious to go home. Not soa.  Diplopia.      Objective     Vital Signs  Temp:  [97.3 °F (36.3 °C)-98.2 °F (36.8 °C)] 98.2 °F (36.8 °C)  Heart Rate:  [77-91] 91  Resp:  [18] 18  BP: ()/(67-74) 108/67    Flowsheet Rows      First Filed Value   Admission Height  157.5 cm (62\") Documented at 03/04/2019 1050   Admission Weight  122 kg (270 lb) Documented at 03/04/2019 1050          No intake/output data recorded.  I/O last 3 completed shifts:  In: 390 [P.O.:390]  Out: -     Intake/Output Summary (Last 24 hours) at 3/19/2019 1525  Last data filed at 3/18/2019 2220  Gross per 24 hour   Intake 150 ml   Output --   Net 150 ml       Physical Exam:  Exam:  General Appearance: alert, oriented x 3, no acute distress, obese. Sitting in chair.   Skin: warm and dry  HEENT: Nonicteric sclerae, oral mucosa normal   Neck: supple, no JVD, trachea midline, surgical incision healing  Lungs: Clear to auscultation   Heart: RRR, normal S1 and S2, no S3, no rub  Abdomen: soft, non-tender,  +bs  : no palpable bladder,  Extremities: trace lower ext  Edema.  Neuro: normal speech and mental status         Results Review:    Results from last 7 days   Lab Units 03/19/19  0555 03/18/19  0620 03/17/19  0540   SODIUM mmol/L 137 139 137   POTASSIUM mmol/L 4.0 3.8 4.2   CHLORIDE mmol/L 96* 97* 93*   CO2 mmol/L 26.2 25.7 28.9   BUN mg/dL 30* 36* 33*   CREATININE mg/dL 1.05* 1.07* 1.14*   CALCIUM mg/dL 7.2* 7.5* 8.2*   GLUCOSE mg/dL 105* 124* 111*       Estimated Creatinine Clearance: 67.4 mL/min (A) (by C-G formula based on SCr of 1.05 mg/dL (H)).    Results from last 7 days   Lab Units 03/19/19  0555 03/18/19  0620 " 03/17/19  0540   MAGNESIUM mg/dL 1.9  --  2.0   PHOSPHORUS mg/dL 4.5 3.8 3.6             Results from last 7 days   Lab Units 03/19/19  0555 03/18/19  0620 03/17/19  0540 03/16/19  0407 03/15/19  0518   WBC 10*3/mm3 20.28* 26.28* 27.20* 29.53* 25.15*   HEMOGLOBIN g/dL 15.6 16.0* 15.3 16.2* 16.9*   PLATELETS 10*3/mm3 188 214 199 202 221               Imaging Results (last 24 hours)     Procedure Component Value Units Date/Time    CT Head Without Contrast [[id number]] Collected:  03/19/19 1147     Updated:  03/19/19 1151    Narrative:       NONCONTRAST HEAD CT 03/19/2019     CLINICAL HISTORY: Bilateral facial numbness.      TECHNIQUE: Spiral CT images were obtained from the base of the skull to  the vertex without intravenous contrast. Images were reformatted and are  submitted in 3 mm thick axial CT sections with brain algorithm and 2 mm  thick sagittal and coronal reconstructions were performed and submitted  in brain algorithm.     This is correlated to a prior MRI of the brain with and without contrast  just 2 weeks ago on 03/04/2019. There are no prior head CTs for  comparison.     FINDINGS: There is an extra-axial dural based mildly hyperdense mass,  most consistent with a meningioma in the medial aspect of the left  middle cranial fossa extending along the lateral dural reflection of the  left cavernous sinus, and it measures approximately 2.1 x 1.5 x 2.3 cm  in anterior posterior, medial lateral and cranial caudal dimension.  It  is better characterized on recent MRI of the head. There is only very  minimal low-density in the periventricular white matter consistent with  minimal small vessel disease. The remainder of the brain parenchyma is  normal in attenuation. The ventricles are normal in size. I see no focal  mass effect. There is no midline shift. No extra-axial fluid collections  are identified. There is no evidence of acute intracranial hemorrhage.  There is a 2nd meningioma that overlies the superior  right parietal  lobe.  It measures 5 mm in size, better seen on recent MRI. The  paranasal sinuses, mastoid air cells and middle ear cavities are clear.       Impression:       1. No acute abnormality is seen with no significant change when compared  to recent MRI of the head just 2 weeks ago 03/04/2019.   2. There is a known meningioma in the medial aspect of the left middle  cranial fossa extending along the lateral margin, lateral dural  reflection of the left cavernous sinus that maximally measures 2.1 x 1.5  x 2.3 cm in anterior posterior, medial lateral and cranial caudal  dimension respectively and there is a tiny, 5 mm, meningioma overlying  the superior right parietal lobe, both of which are better characterized  on recent MRI of the head 03/04/2019.  3. 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 The remainder of the head CT is within  normal limits. The etiology of the bilateral facial numbness is not  established on this exam. If symptoms persist a repeat MRI of the head  could be obtained for more comprehensive assessment.            Radiation dose reduction techniques were utilized, including automated  exposure control and exposure modulation based on body size.     This report was finalized on 3/19/2019 11:48 AM by Dr. Luis Schumacher M.D.             calcitriol 0.25 mcg Oral Q12H   calcium carbonate 3 tablet Oral TID   famotidine 40 mg Oral BID   metoprolol tartrate 25 mg Oral Q12H   polyethylene glycol 17 g Oral Daily   sennosides-docusate sodium 2 tablet Oral Nightly   sodium chloride 3 mL Intravenous Q12H          Medication Review:   Current Facility-Administered Medications   Medication Dose Route Frequency Provider Last Rate Last Dose   • acetaminophen (TYLENOL) tablet 650 mg  650 mg Oral Q4H PRN Michelle Reynoso MD        Or   • acetaminophen (TYLENOL) 160 MG/5ML solution  650 mg  650 mg Oral Q4H PRN Michelle Reynoso MD        Or   • acetaminophen (TYLENOL) suppository 650 mg  650 mg Rectal Q4H PRN Michelle Reynoso MD       • bisacodyl (DULCOLAX) suppository 10 mg  10 mg Rectal Daily PRN Juancho Yoder MD   10 mg at 03/16/19 1227   • calcitriol (ROCALTROL) capsule 0.25 mcg  0.25 mcg Oral Q12H Juancho العلي MD   0.25 mcg at 03/19/19 0911   • calcium carbonate (TUMS) chewable tablet 500 mg (200 mg elemental)  3 tablet Oral TID Tashia Cat MD       • famotidine (PEPCID) tablet 40 mg  40 mg Oral BID Juancho Yoder MD   40 mg at 03/19/19 0911   • hydrALAZINE (APRESOLINE) injection 10 mg  10 mg Intravenous Q6H PRN Carol Ann Johnson APRN       • HYDROcodone-acetaminophen (NORCO)  MG per tablet 1 tablet  1 tablet Oral Q4H PRN Michelle Reynoso MD   1 tablet at 03/13/19 1730   • HYDROcodone-acetaminophen (NORCO) 5-325 MG per tablet 1 tablet  1 tablet Oral Q4H PRN Michelle Reynoso MD   1 tablet at 03/15/19 0639   • HYDROmorphone (DILAUDID) injection 0.5 mg  0.5 mg Intravenous Q2H PRN Michelle Reynoso MD        And   • naloxone (NARCAN) injection 0.1 mg  0.1 mg Intravenous Q5 Min PRN Michelle Reynoso MD       • LORazepam (ATIVAN) tablet 0.5 mg  0.5 mg Oral Q6H PRN Juancho Yoder MD       • meclizine (ANTIVERT) tablet 25 mg  25 mg Oral TID PRN Juancho Yoder MD       • metoprolol tartrate (LOPRESSOR) tablet 25 mg  25 mg Oral Q12H Juancho Yoder MD   25 mg at 03/19/19 0911   • morphine injection 4 mg  4 mg Intravenous Q2H PRN Michelle Reynoso MD        And   • naloxone (NARCAN) injection 0.4 mg  0.4 mg Intravenous Q5 Min PRN Michelle Reynoso MD       • nitroglycerin (NITROSTAT) SL tablet 0.4 mg  0.4 mg Sublingual Q5 Min PRN Alice Simmons MD       • ondansetron (ZOFRAN) injection 4 mg  4 mg Intravenous Q6H PRN Alice Simmons MD   4 mg at 03/18/19 0911   • polyethylene glycol 3350 powder (packet)  17 g Oral Daily Juancho Yoder MD   17 g at 03/17/19 9337    • sennosides-docusate sodium (SENOKOT-S) 8.6-50 MG tablet 2 tablet  2 tablet Oral Nightly Abel Lee MD   2 tablet at 03/17/19 2131   • sodium chloride 0.9 % flush 10 mL  10 mL Intravenous PRN Bull Branch II, MD       • sodium chloride 0.9 % flush 3 mL  3 mL Intravenous Q12H Alice Simmons MD   3 mL at 03/19/19 0920   • sodium chloride 0.9 % flush 3-10 mL  3-10 mL Intravenous PRN Alice Simmons MD           Assessment/Plan   1. Hypercalcemia: Due to primary hyperparathyroidism, SP parathyroidectomy (S/P left inferior and right superior adenoma resection).  Hypocalcemia, calcium  7.2. Increase po calcium carbonate.  Goal is 8 for dc.   2. Hypertension. Too controlled.  On metoprolol with parameters.   3. Intracranial meningioma with cerebral edema and 6th nerve palsy  4. Severe obesity:  BMI 52  5.  Severe GERD     Plan:  1.  Tums 1500 mg 3 times daily      Tashia Cat MD  03/19/19  3:25 PM

## 2019-03-19 NOTE — PLAN OF CARE
Problem: Patient Care Overview  Goal: Plan of Care Review  Outcome: Ongoing (interventions implemented as appropriate)   03/19/19 3631   Coping/Psychosocial   Plan of Care Reviewed With patient   OTHER   Outcome Summary vss, no falls, no pain, c/o numbness, neurosurgery re-consulted, CT head no change, increased tums, continue to monitor   Plan of Care Review   Progress improving     Goal: Individualization and Mutuality  Outcome: Ongoing (interventions implemented as appropriate)      Problem: Skin Injury Risk (Adult)  Goal: Skin Health and Integrity  Outcome: Ongoing (interventions implemented as appropriate)      Problem: Pain, Acute (Adult)  Goal: Acceptable Pain Control/Comfort Level  Outcome: Ongoing (interventions implemented as appropriate)      Problem: Fall Risk (Adult)  Goal: Absence of Fall  Outcome: Ongoing (interventions implemented as appropriate)

## 2019-03-19 NOTE — SIGNIFICANT NOTE
03/19/19 1344   Rehab Treatment   Discipline physical therapy assistant   Reason Treatment Not Performed patient/family declined treatment, not feeling well  (Pt states that she is unable to do PT today due to dizziness and weakness that has her feeling horrible.  SHANTAL Lopez is aware PT to follow up tomorrow)   Recommendation   PT - Next Appointment 03/20/19

## 2019-03-20 VITALS
TEMPERATURE: 98 F | RESPIRATION RATE: 18 BRPM | DIASTOLIC BLOOD PRESSURE: 74 MMHG | HEART RATE: 78 BPM | OXYGEN SATURATION: 96 % | HEIGHT: 62 IN | WEIGHT: 268.7 LBS | SYSTOLIC BLOOD PRESSURE: 114 MMHG | BODY MASS INDEX: 49.45 KG/M2

## 2019-03-20 PROBLEM — E83.51 HYPOCALCEMIA: Status: ACTIVE | Noted: 2019-03-20

## 2019-03-20 LAB
ALBUMIN SERPL-MCNC: 3.2 G/DL (ref 3.5–5.2)
ALBUMIN/GLOB SERPL: 1 G/DL
ALP SERPL-CCNC: 154 U/L (ref 39–117)
ALT SERPL W P-5'-P-CCNC: 16 U/L (ref 1–33)
ANION GAP SERPL CALCULATED.3IONS-SCNC: 19.5 MMOL/L
AST SERPL-CCNC: 13 U/L (ref 1–32)
BILIRUB SERPL-MCNC: 1.1 MG/DL (ref 0.2–1.2)
BUN BLD-MCNC: 28 MG/DL (ref 8–23)
BUN/CREAT SERPL: 26.4 (ref 7–25)
CALCIUM SPEC-SCNC: 6.9 MG/DL (ref 8.6–10.5)
CHLORIDE SERPL-SCNC: 98 MMOL/L (ref 98–107)
CO2 SERPL-SCNC: 23.5 MMOL/L (ref 22–29)
CREAT BLD-MCNC: 1.06 MG/DL (ref 0.57–1)
DEPRECATED RDW RBC AUTO: 45.3 FL (ref 37–54)
ERYTHROCYTE [DISTWIDTH] IN BLOOD BY AUTOMATED COUNT: 14 % (ref 12.3–15.4)
GFR SERPL CREATININE-BSD FRML MDRD: 52 ML/MIN/1.73
GLOBULIN UR ELPH-MCNC: 3.3 GM/DL
GLUCOSE BLD-MCNC: 90 MG/DL (ref 65–99)
HCT VFR BLD AUTO: 43.4 % (ref 34–46.6)
HGB BLD-MCNC: 14.6 G/DL (ref 12–15.9)
MAGNESIUM SERPL-MCNC: 1.6 MG/DL (ref 1.6–2.4)
MCH RBC QN AUTO: 29.8 PG (ref 26.6–33)
MCHC RBC AUTO-ENTMCNC: 33.6 G/DL (ref 31.5–35.7)
MCV RBC AUTO: 88.6 FL (ref 79–97)
PHOSPHATE SERPL-MCNC: 4.5 MG/DL (ref 2.5–4.5)
PLATELET # BLD AUTO: 181 10*3/MM3 (ref 140–450)
PMV BLD AUTO: 11 FL (ref 6–12)
POTASSIUM BLD-SCNC: 3.7 MMOL/L (ref 3.5–5.2)
PROT SERPL-MCNC: 6.5 G/DL (ref 6–8.5)
RBC # BLD AUTO: 4.9 10*6/MM3 (ref 3.77–5.28)
SODIUM BLD-SCNC: 141 MMOL/L (ref 136–145)
WBC NRBC COR # BLD: 17.94 10*3/MM3 (ref 3.4–10.8)

## 2019-03-20 PROCEDURE — 80053 COMPREHEN METABOLIC PANEL: CPT | Performed by: INTERNAL MEDICINE

## 2019-03-20 PROCEDURE — 85027 COMPLETE CBC AUTOMATED: CPT | Performed by: INTERNAL MEDICINE

## 2019-03-20 PROCEDURE — 83735 ASSAY OF MAGNESIUM: CPT | Performed by: INTERNAL MEDICINE

## 2019-03-20 PROCEDURE — 84100 ASSAY OF PHOSPHORUS: CPT | Performed by: INTERNAL MEDICINE

## 2019-03-20 PROCEDURE — 99231 SBSQ HOSP IP/OBS SF/LOW 25: CPT | Performed by: INTERNAL MEDICINE

## 2019-03-20 RX ORDER — CALCITRIOL 0.25 UG/1
0.5 CAPSULE, LIQUID FILLED ORAL EVERY 12 HOURS SCHEDULED
Status: DISCONTINUED | OUTPATIENT
Start: 2019-03-20 | End: 2019-03-20 | Stop reason: HOSPADM

## 2019-03-20 RX ORDER — CALCIUM CARBONATE 200(500)MG
4 TABLET,CHEWABLE ORAL 3 TIMES DAILY
Qty: 360 TABLET | Refills: 0 | Status: SHIPPED | OUTPATIENT
Start: 2019-03-20 | End: 2020-09-22

## 2019-03-20 RX ORDER — SENNA AND DOCUSATE SODIUM 50; 8.6 MG/1; MG/1
2 TABLET, FILM COATED ORAL NIGHTLY PRN
Qty: 60 TABLET | Refills: 0 | Status: SHIPPED | OUTPATIENT
Start: 2019-03-20 | End: 2019-04-09

## 2019-03-20 RX ORDER — MAGNESIUM SULFATE 1 G/100ML
1 INJECTION INTRAVENOUS
Status: DISPENSED | OUTPATIENT
Start: 2019-03-20 | End: 2019-03-20

## 2019-03-20 RX ORDER — CALCITRIOL 0.5 UG/1
0.5 CAPSULE, LIQUID FILLED ORAL EVERY 12 HOURS SCHEDULED
Qty: 60 CAPSULE | Refills: 0 | Status: SHIPPED | OUTPATIENT
Start: 2019-03-20 | End: 2019-04-09

## 2019-03-20 RX ORDER — CALCIUM CARBONATE 200(500)MG
4 TABLET,CHEWABLE ORAL 3 TIMES DAILY
Status: DISCONTINUED | OUTPATIENT
Start: 2019-03-20 | End: 2019-03-20 | Stop reason: HOSPADM

## 2019-03-20 RX ORDER — FAMOTIDINE 40 MG/1
40 TABLET, FILM COATED ORAL 2 TIMES DAILY
Qty: 60 TABLET | Refills: 0 | Status: SHIPPED | OUTPATIENT
Start: 2019-03-20 | End: 2019-04-09

## 2019-03-20 RX ADMIN — Medication 4 TABLET: at 08:53

## 2019-03-20 RX ADMIN — FAMOTIDINE 40 MG: 40 TABLET, FILM COATED ORAL at 08:53

## 2019-03-20 RX ADMIN — SODIUM CHLORIDE, PRESERVATIVE FREE 3 ML: 5 INJECTION INTRAVENOUS at 09:39

## 2019-03-20 RX ADMIN — METOPROLOL TARTRATE 25 MG: 25 TABLET ORAL at 08:53

## 2019-03-20 RX ADMIN — CALCITRIOL CAPSULES 0.25 MCG 0.5 MCG: 0.25 CAPSULE ORAL at 08:53

## 2019-03-20 NOTE — PLAN OF CARE
Problem: Patient Care Overview  Goal: Plan of Care Review  Outcome: Ongoing (interventions implemented as appropriate)   03/20/19 5959   Coping/Psychosocial   Plan of Care Reviewed With patient   OTHER   Outcome Summary Tums dosage increased/ for am labs-gilson calcium level/anxious to go home/continues with diplopia/declines to wear eye patch/rest encouraged and promoted/incisional area healing/no c/o acute pain voiced/does have Ativan prn for anxiety, but not given/Plan is to D/C home today if calcium level above 8. States Dr. Cat (Nephrology) explained calcium situation the best where patient understands  it/will continue to monitor/emotional support given prn    Plan of Care Review   Progress improving       Problem: Skin Injury Risk (Adult)  Goal: Skin Health and Integrity  Outcome: Ongoing (interventions implemented as appropriate)      Problem: Pain, Acute (Adult)  Goal: Acceptable Pain Control/Comfort Level  Outcome: Ongoing (interventions implemented as appropriate)      Problem: Fall Risk (Adult)  Goal: Absence of Fall  Outcome: Ongoing (interventions implemented as appropriate)

## 2019-03-20 NOTE — SIGNIFICANT NOTE
03/20/19 1123   Rehab Treatment   Discipline physical therapy assistant   Reason Treatment Not Performed (Pt to d/c home today)

## 2019-03-20 NOTE — PROGRESS NOTES
"   LOS: 16 days    Patient Care Team:  Juarez Winters MD as PCP - General (Family Medicine)    Chief Complaint:    Chief Complaint   Patient presents with   • Headache   • Eye Pain   • Diplopia     Follow UP Hypercalcemia  Subjective     Interval History:   The patient is feeling much better there is no numbness perioral or of the digits.  No nausea or vomiting, no chest pain or shortness of air, no abdominal pain, no dysuria or gross hematuria.      Objective     Vital Signs  Temp:  [97.6 °F (36.4 °C)-98.2 °F (36.8 °C)] 98 °F (36.7 °C)  Heart Rate:  [74-95] 78  Resp:  [18-20] 18  BP: ()/(57-74) 114/74    Flowsheet Rows      First Filed Value   Admission Height  157.5 cm (62\") Documented at 03/04/2019 1050   Admission Weight  122 kg (270 lb) Documented at 03/04/2019 1050          No intake/output data recorded.  I/O last 3 completed shifts:  In: 300 [P.O.:300]  Out: 200 [Urine:200]    Intake/Output Summary (Last 24 hours) at 3/20/2019 0751  Last data filed at 3/19/2019 2149  Gross per 24 hour   Intake 150 ml   Output 200 ml   Net -50 ml       Physical Exam:  Exam:  General Appearance: alert, oriented x 3, no acute distress, obese. Sitting in chair.   Skin: warm and dry  HEENT: Nonicteric sclerae, oral mucosa normal   Neck: supple, no JVD, trachea midline, surgical incision healing  Lungs: Clear to auscultation   Heart: RRR, normal S1 and S2, no S3, no rub  Abdomen: soft, non-tender,  +bs  : no palpable bladder,  Extremities: trace lower ext  Edema.  Neuro: normal speech and mental status         Results Review:    Results from last 7 days   Lab Units 03/20/19  0550 03/19/19  0555 03/18/19  0620   SODIUM mmol/L 141 137 139   POTASSIUM mmol/L 3.7 4.0 3.8   CHLORIDE mmol/L 98 96* 97*   CO2 mmol/L 23.5 26.2 25.7   BUN mg/dL 28* 30* 36*   CREATININE mg/dL 1.06* 1.05* 1.07*   CALCIUM mg/dL 6.9* 7.2* 7.5*   BILIRUBIN mg/dL 1.1  --   --    ALK PHOS U/L 154*  --   --    ALT (SGPT) U/L 16  --   --    AST (SGOT) " U/L 13  --   --    GLUCOSE mg/dL 90 105* 124*       Estimated Creatinine Clearance: 66.8 mL/min (A) (by C-G formula based on SCr of 1.06 mg/dL (H)).    Results from last 7 days   Lab Units 03/20/19  0550 03/19/19  0555 03/18/19  0620 03/17/19  0540   MAGNESIUM mg/dL 1.6 1.9  --  2.0   PHOSPHORUS mg/dL 4.5 4.5 3.8 3.6             Results from last 7 days   Lab Units 03/20/19  0549 03/19/19  0555 03/18/19  0620 03/17/19  0540 03/16/19  0407   WBC 10*3/mm3 17.94* 20.28* 26.28* 27.20* 29.53*   HEMOGLOBIN g/dL 14.6 15.6 16.0* 15.3 16.2*   PLATELETS 10*3/mm3 181 188 214 199 202               Imaging Results (last 24 hours)     Procedure Component Value Units Date/Time    CT Head Without Contrast [[id number]] Collected:  03/19/19 1147     Updated:  03/19/19 1151    Narrative:       NONCONTRAST HEAD CT 03/19/2019     CLINICAL HISTORY: Bilateral facial numbness.      TECHNIQUE: Spiral CT images were obtained from the base of the skull to  the vertex without intravenous contrast. Images were reformatted and are  submitted in 3 mm thick axial CT sections with brain algorithm and 2 mm  thick sagittal and coronal reconstructions were performed and submitted  in brain algorithm.     This is correlated to a prior MRI of the brain with and without contrast  just 2 weeks ago on 03/04/2019. There are no prior head CTs for  comparison.     FINDINGS: There is an extra-axial dural based mildly hyperdense mass,  most consistent with a meningioma in the medial aspect of the left  middle cranial fossa extending along the lateral dural reflection of the  left cavernous sinus, and it measures approximately 2.1 x 1.5 x 2.3 cm  in anterior posterior, medial lateral and cranial caudal dimension.  It  is better characterized on recent MRI of the head. There is only very  minimal low-density in the periventricular white matter consistent with  minimal small vessel disease. The remainder of the brain parenchyma is  normal in attenuation. The  ventricles are normal in size. I see no focal  mass effect. There is no midline shift. No extra-axial fluid collections  are identified. There is no evidence of acute intracranial hemorrhage.  There is a 2nd meningioma that overlies the superior right parietal  lobe.  It measures 5 mm in size, better seen on recent MRI. The  paranasal sinuses, mastoid air cells and middle ear cavities are clear.       Impression:       1. No acute abnormality is seen with no significant change when compared  to recent MRI of the head just 2 weeks ago 03/04/2019.   2. There is a known meningioma in the medial aspect of the left middle  cranial fossa extending along the lateral margin, lateral dural  reflection of the left cavernous sinus that maximally measures 2.1 x 1.5  x 2.3 cm in anterior posterior, medial lateral and cranial caudal  dimension respectively and there is a tiny, 5 mm, meningioma overlying  the superior right parietal lobe, both of which are better characterized  on recent MRI of the head 03/04/2019.  3. 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 The remainder of the head CT is within  normal limits. The etiology of the bilateral facial numbness is not  established on this exam. If symptoms persist a repeat MRI of the head  could be obtained for more comprehensive assessment.            Radiation dose reduction techniques were utilized, including automated  exposure control and exposure modulation based on body size.     This report was finalized on 3/19/2019 11:48 AM by Dr. Luis Schumacher M.D.             calcitriol 0.25 mcg Oral Q12H   calcium carbonate 3 tablet Oral TID   famotidine 40 mg Oral BID   metoprolol tartrate 25 mg Oral Q12H   polyethylene glycol 17 g Oral Daily   sennosides-docusate sodium 2 tablet Oral Nightly   sodium chloride 3 mL Intravenous Q12H          Medication Review:    Current Facility-Administered Medications   Medication Dose Route Frequency Provider Last Rate Last Dose   • acetaminophen (TYLENOL) tablet 650 mg  650 mg Oral Q4H PRN Michelle Reynoso MD        Or   • acetaminophen (TYLENOL) 160 MG/5ML solution 650 mg  650 mg Oral Q4H PRN Michelle Reynoso MD        Or   • acetaminophen (TYLENOL) suppository 650 mg  650 mg Rectal Q4H PRN Michelle Reynoso MD       • bisacodyl (DULCOLAX) suppository 10 mg  10 mg Rectal Daily PRN Juancho Yoder MD   10 mg at 03/16/19 1227   • calcitriol (ROCALTROL) capsule 0.25 mcg  0.25 mcg Oral Q12H Juancho العلي MD   0.25 mcg at 03/19/19 2145   • calcium carbonate (TUMS) chewable tablet 500 mg (200 mg elemental)  3 tablet Oral TID Tashia Cat MD   3 tablet at 03/19/19 2144   • famotidine (PEPCID) tablet 40 mg  40 mg Oral BID Juancho Yoder MD   40 mg at 03/19/19 2144   • hydrALAZINE (APRESOLINE) injection 10 mg  10 mg Intravenous Q6H PRN Carol Ann Johnson APRN       • HYDROcodone-acetaminophen (NORCO)  MG per tablet 1 tablet  1 tablet Oral Q4H PRN Michelle Reynoso MD   1 tablet at 03/13/19 1730   • HYDROcodone-acetaminophen (NORCO) 5-325 MG per tablet 1 tablet  1 tablet Oral Q4H PRN Michelle Reynoso MD   1 tablet at 03/15/19 0639   • HYDROmorphone (DILAUDID) injection 0.5 mg  0.5 mg Intravenous Q2H PRN Michelle Reynoso MD        And   • naloxone (NARCAN) injection 0.1 mg  0.1 mg Intravenous Q5 Min PRN Michelle Reynoso MD       • LORazepam (ATIVAN) tablet 0.5 mg  0.5 mg Oral Q6H PRN Juancho Yoder MD       • meclizine (ANTIVERT) tablet 25 mg  25 mg Oral TID PRN Juancho Yoder MD       • metoprolol tartrate (LOPRESSOR) tablet 25 mg  25 mg Oral Q12H Juancho Yoder MD   25 mg at 03/19/19 2144   • morphine injection 4 mg  4 mg Intravenous Q2H PRN Michelle Reynoso MD        And   • naloxone (NARCAN) injection 0.4 mg  0.4 mg Intravenous Q5 Min PRN Michelle Reynoso MD       • nitroglycerin (NITROSTAT) SL tablet 0.4  mg  0.4 mg Sublingual Q5 Min PRN Alice Simmons MD       • ondansetron (ZOFRAN) injection 4 mg  4 mg Intravenous Q6H PRN Alice Simmons MD   4 mg at 03/18/19 0911   • polyethylene glycol 3350 powder (packet)  17 g Oral Daily Juancho Yoder MD   17 g at 03/17/19 0851   • sennosides-docusate sodium (SENOKOT-S) 8.6-50 MG tablet 2 tablet  2 tablet Oral Nightly Abel Lee MD   2 tablet at 03/19/19 2144   • sodium chloride 0.9 % flush 10 mL  10 mL Intravenous PRN Bull Branch II, MD       • sodium chloride 0.9 % flush 3 mL  3 mL Intravenous Q12H Alice Simmons MD   3 mL at 03/19/19 2200   • sodium chloride 0.9 % flush 3-10 mL  3-10 mL Intravenous PRN Alice Simmons MD           Assessment/Plan   1. Hypercalcemia: Due to primary hyperparathyroidism, SP parathyroidectomy (S/P left inferior and right superior adenoma resection).  Hypocalcemia, calcium 6.9, when corrected to albumin of 3.2 the corrected calcium is 7.5.  Goal is 8 for discharge  2. Hypertension.  Controlled  3. Intracranial meningioma with cerebral edema and 6th nerve palsy  4. Severe obesity:  BMI 52  5.  Severe GERD  6.  Mild hypomagnesemia     Plan:  1.  Increase Tums to 2000 mg 3 times daily  2.  Increase calcitriol to 0.5 mcg twice daily  3.  Replete magnesium  4.  Surveillance labs      Ranjith Gallegos MD  03/20/19  7:51 AM

## 2019-03-20 NOTE — PROGRESS NOTES
"Continued Stay Note  [facility]     Patient Name: Joan Almanza  MRN: [id number]  Today's Date: 3/20/2019    Admit Date: 3/4/2019    Discharge Plan     Row Name 03/20/19 0948       Plan    Plan  Home with spouse and prescriptions    Patient/Family in Agreement with Plan  yes    Plan Comments  Recieved inbound call from pt this morning, she stating she wants to go home and \"cant stay another day\" (shes been here 18 days) CCP verbalized understanding and offered reassurance. Explained would reach out to her MD. Spoke with Ana Cristina/Hospitalist Coordinator expressed her concerns and wish to dc today. Spoke with RN and charge RN. Kadie MURGUIA/CCP        Discharge Codes    No documentation.             Mendy San RN    "

## 2019-03-20 NOTE — NURSING NOTE
Pt left AMA. Was educated on importance of staying and having labs to monitor calcium levels. She declined any further intervention. She refused her magnesium IV x2 doses.. DC was canceled due to surgery objection r/t calcium level of 6.9. Pt informed of this. Demanded to be released AMA. Signed paper. Informed charge nurses and Dr. Yoder. Will continue to monitor.

## 2019-03-20 NOTE — PROGRESS NOTES
Name: Joan Almanza ADMIT: 3/4/2019   : 1955  PCP: Juarez Winters MD    MRN: [id number] LOS: 16 days   AGE/SEX: 64 y.o. female  ROOM: [facility]   Subjective   No CP SOA NVD. No paresthesia today.    Initial plan was for discharge with increased TUMS and repeat labs. Based on calcium level surgery thought she was not ready for discharge so discharge was held. She then left AMA.    Objective   Vital Signs  Temp:  [97.6 °F (36.4 °C)-98 °F (36.7 °C)] 98 °F (36.7 °C)  Heart Rate:  [74-95] 78  Resp:  [18-20] 18  BP: ()/(57-74) 114/74     on   ;   Device (Oxygen Therapy): room air  Body mass index is 49.15 kg/m².    Physical Exam   Constitutional: She is oriented to person, place, and time. She appears well-developed. No distress.   HENT:   Head: Atraumatic.   Nose: Nose normal.   Eyes: Conjunctivae are normal. Pupils are equal, round, and reactive to light.   Neck: Normal range of motion. Neck supple.   Incision cdi   Cardiovascular: Normal rate and intact distal pulses. An irregularly irregular rhythm present.   Pulmonary/Chest: Effort normal. She has no wheezes. She has no rales.   Abdominal: Soft. She exhibits no distension. There is no tenderness. There is no rebound.   Musculoskeletal: Normal range of motion. She exhibits no tenderness.   Neurological: She is alert and oriented to person, place, and time.   Skin: Skin is warm and dry. She is not diaphoretic.   Psychiatric: She has a normal mood and affect. Her behavior is normal.   Nursing note and vitals reviewed.      Results Review:       I reviewed the patient's new clinical results.      Results from last 7 days   Lab Units 19  0549 19  0555 19  0620 19  0540   WBC 10*3/mm3 17.94* 20.28* 26.28* 27.20*   HEMOGLOBIN g/dL 14.6 15.6 16.0* 15.3   PLATELETS 10*3/mm3 181 188 214 199     Results from last 7 days   Lab Units 19  0550 19  0555 19  0620 19  0540   SODIUM mmol/L 141 137 139 137   POTASSIUM  mmol/L 3.7 4.0 3.8 4.2   CHLORIDE mmol/L 98 96* 97* 93*   CO2 mmol/L 23.5 26.2 25.7 28.9   BUN mg/dL 28* 30* 36* 33*   CREATININE mg/dL 1.06* 1.05* 1.07* 1.14*   GLUCOSE mg/dL 90 105* 124* 111*   Estimated Creatinine Clearance: 66.8 mL/min (A) (by C-G formula based on SCr of 1.06 mg/dL (H)).  Results from last 7 days   Lab Units 03/20/19  0550 03/19/19  0555 03/18/19  0620 03/17/19  0540   CALCIUM mg/dL 6.9* 7.2* 7.5* 8.2*   ALBUMIN g/dL 3.20* 3.20* 3.30* 3.30*   MAGNESIUM mg/dL 1.6 1.9  --  2.0   PHOSPHORUS mg/dL 4.5 4.5 3.8 3.6         calcitriol 0.5 mcg Oral Q12H   calcium carbonate 4 tablet Oral TID   famotidine 40 mg Oral BID   metoprolol tartrate 25 mg Oral Q12H   polyethylene glycol 17 g Oral Daily   sennosides-docusate sodium 2 tablet Oral Nightly   sodium chloride 3 mL Intravenous Q12H      Diet Regular      Assessment/Plan      Active Hospital Problems    Diagnosis  POA   • **Diplopia [H53.2]  Yes   • Hypocalcemia [E83.51]  No   • GERD without esophagitis [K21.9]  Yes   • Morbid obesity (CMS/HCC) [E66.01]  Yes   • Hypercalcemia [E83.52]  Yes   • Hyperparathyroidism (CMS/HCC) [E21.3]  Yes   • Cerebral edema (CMS/HCC) [G93.6]  Yes   • Meningioma (CMS/HCC) [D32.9]  Yes   • AVF (arteriovenous fistula) (CMS/HCC) [I77.0]  Yes   • 6th nerve palsy, left [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     - 6th Nerve Palsy: Diplopia unchanged. Repeat CT head unchanged. [facility] follow up.  - AV Malformation/Cavernous Sinus Tumor: See above  - Hyperparathyroidism: sp right superior/left inferior parathyroid resections, right inferior/left superior parathyroid biopsy, right thyroid nodule resection 03/13. Surgery following.  - Hypercalcemia: Calcium still low this morning. Neurologic complication present with paresthesia. Calcium Carbonate, Calcitriol. Nephrology following.  - AFib: Metoprolol. No ac planned due to bleed risk. Cardiology following.  - Leukocytosis: Improving slowly off  steroid. Monitor.  - HTN: Continue Metoprolol.  - GERD: Famotidine  - Disposition: AMA/medications ordered for TUMS and calcitriol and sent to pharmacy. Repeat BMP ordered.    Juancho Yoder MD  [facility]  03/20/19  12:40 PM

## 2019-03-20 NOTE — DISCHARGE SUMMARY
Date of Admission: 3/4/2019  Date of Discharge:  3/20/2019  Primary Care Physician: Juarez Winters MD     Discharge Diagnosis:  Active Hospital Problems    Diagnosis  POA   • **Diplopia [H53.2]  Yes   • Hypocalcemia [E83.51]  No   • GERD without esophagitis [K21.9]  Yes   • Morbid obesity (CMS/HCC) [E66.01]  Yes   • Hypercalcemia [E83.52]  Yes   • Hyperparathyroidism (CMS/HCC) [E21.3]  Yes   • Cerebral edema (CMS/HCC) [G93.6]  Yes   • Meningioma (CMS/HCC) [D32.9]  Yes   • AVF (arteriovenous fistula) (CMS/HCC) [I77.0]  Yes   • 6th nerve palsy, left [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  She thought that she must have strained herself doing work on the computer so she did not think much of it until next morning when she woke up and noticed that she was seeing double with gets corrected when she covers one eye.  She has 87 days before her Medicare kicks in so she thought that maybe she could tough it out and it would get better.  By the weekend she started having discomfort and pain when she started to look up or move her eyes to look at different places.  This got her very much concerned so she decided to come to the emergency room for further evaluation.  Workup in the emergency room revealed a left 6th nerve palsy and meningioma in the left middle cranial fossa with invasion to the adjacent cavernous sinus causing  associated vasogenic edema within the left anterior temporal lobe.  Additionally patient was noted to have a dural AV fistula along the lateral aspect of the right temporal occipital junction with prominent venous vasculature seen along the lateral aspect of the right temporal and occipital lobes.  Because of these findings patient was discussed with neurosurgery service and is being admitted for further care.  Patient has not seen her physician for quite some time but does recall being on blood pressure medicine that was supposed to control her heart rate.      Hospital Course:  The patient is a 64 y.o. female who had a prolonged and complicated hospital course.  She was admitted for diplopia/VI nerve palsy secondary to intracranial meningioma and intracranial dural AV fistula/AV malformation.  Neurosurgery was consulted and she was given steroids for cerebral edema.  She has a cavernous sinus tumor.  She will need to follow-up with neurosurgery in clinic with repeat head imaging planned.    She was found to be hypercalcemic secondary to primary hyperparathyroidism and adenoma.  She underwent right superior and left inferior parathyroid resections.  Surgery and nephrology were consulted.  Her hypercalcemia resolved and she developed reflex hypocalcemia.  She was unable to tolerate oral calcium supplements due to GERD symptoms.  She was started on famotidine and changed to calcium carbonate for calcium replacement.  She had persistently low calcium and were working on getting that stabilized prior to discharge.  She also was on calcitriol.  Corrected calcium on her last day of stay was 7.5 and Tums were increased.  There was some discussion over whether she can be discharged or not however surgery did not feel it was safe to do so.  Plan was to continue to monitor her however she then signed out AGAINST MEDICAL ADVICE.    She has atrial fibrillation and rate was controlled on metoprolol.  Due to her AV malformation  and risk of intracranial bleeding she has not been placed on anticoagulation.  She will need follow-up with cardiology in clinic.    She had leukocytosis but no localizing signs of infection.  Her leukocytosis did improve with steroid taper  So was secondary to steroids.    She has significant GERD and after discussion with nephrology decided that antihistamine blocker would be better than PPI in her case.    Again she left AGAINST MEDICAL ADVICE however I did send updated prescriptions to her pharmacy for the Tums and calcitriol.  In addition repeat BMP has been ordered to monitor her calcium level as an outpatient.  She will need follow-up with neurosurgery, general surgery, nephrology, cardiology in addition to her primary care.    Exam Today:  Constitutional: She is oriented to person, place, and time. She appears well-developed. No distress.   HENT:   Head: Atraumatic.   Nose: Nose normal.   Eyes: Conjunctivae are normal. Pupils are equal, round, and reactive to light.   Neck: Normal range of motion. Neck supple.   Incision cdi   Cardiovascular: Normal rate and intact distal pulses. An irregularly irregular rhythm present.   Pulmonary/Chest: Effort normal. She has no wheezes. She has no rales.   Abdominal: Soft. She exhibits no distension. There is no tenderness. There is no rebound.   Musculoskeletal: Normal range of motion. She exhibits no tenderness.   Neurological: She is alert and oriented to person, place, and time.   Skin: Skin is warm and dry. She is not diaphoretic.   Psychiatric: She has a normal mood and affect. Her behavior is normal.     Procedures Performed:  Procedure(s):  LEFT INFERIOR RIGHT SUPERIOR PARATHYRIOD ADENOMA RESECTION, BIOPSY OF LEFT SUPERIOR AND INFERIOR PARATHYRIOD       Consults:   Consults     Date and Time Order Name Status Description    3/15/2019 2839 Inpatient Cardiology Consult      3/12/2019 1122 Inpatient Neurosurgery Consult      3/12/2019 0827 Inpatient General Surgery  Consult Completed     3/6/2019 1034 Inpatient Cardiology Consult Completed     3/5/2019 1031 Inpatient Nephrology Consult Completed     3/4/2019 2032 Inpatient Neurosurgery Consult      3/4/2019 1707 [facility] (on-call MD unless specified)      3/4/2019 1612 Neurosurgery (on-call MD unless specified) Completed            Discharge Disposition:  AGAINST MEDICAL ADVICE    Discharge Medications:     Discharge Medications      New Medications      Instructions Start Date   calcitriol 0.5 MCG capsule  Commonly known as:  ROCALTROL  Notes to patient:  Next dose due 3/20/2019 at 9 pm.   0.5 mcg, Oral, Every 12 Hours Scheduled      calcium carbonate 500 MG chewable tablet  Commonly known as:  TUMS  Notes to patient:  Next dose due 3/20/2019 at 3 pm.    4 tablets, Oral, 3 Times Daily      famotidine 40 MG tablet  Commonly known as:  PEPCID  Notes to patient:  Next dose due 3/20/2019 at 9 pm.    40 mg, Oral, 2 Times Daily      metoprolol tartrate 25 MG tablet  Commonly known as:  LOPRESSOR  Notes to patient:  Next dose due 3/20/2019 at 9 pm.    25 mg, Oral, Every 12 Hours Scheduled      sennosides-docusate sodium 8.6-50 MG tablet  Commonly known as:  SENOKOT-S   2 tablets, Oral, Nightly PRN         Continue These Medications      Instructions Start Date   ibuprofen 200 MG tablet  Commonly known as:  ADVIL,MOTRIN   400 mg, Oral, Every 6 Hours PRN         Stop These Medications    cholecalciferol 1000 units tablet  Commonly known as:  VITAMIN D3     omeprazole 20 MG capsule  Commonly known as:  priLOSEC            Discharge Diet:   Diet Instructions     Advance Diet As Tolerated            Activity at Discharge:   Activity Instructions     Activity as Tolerated            Follow-up Appointments:  Additional Instructions for the Follow-ups that You Need to Schedule     Renal Function Panel    Mar 25, 2019 (Approximate)      Send results to Dr Gallegos Neph[facility]. Thanks.    Order Comments:  Send results to Dr Gallegos [facility].  Thanks.            Follow-up Information     Abel Bhakta MD Follow up.    Specialties:  Neurosurgery, Radiology  Why:  4-6 weeks; our office will call to arrange appt  Contact information:  [address] [facility] [address]  [place] [address]  [phone]             Juarez Winters MD Follow up.    Specialty:  Family Medicine  Contact information:  [address]  [facility] [address]  [phone]             Ranjith Gallegos MD Follow up.    Specialty:  Nephrology  Contact information:  [address]  [place] [id number]  [phone]             Michelle Reynoso MD Follow up.    Specialty:  General Surgery  Contact information:  [address] [facility]  [place] [address]  [phone]             Mary Baeza MD Follow up.    Specialty:  Cardiology  Contact information:  [address] [facility] [address]  [place] [address]  [phone]                   Test Results Pending at Discharge:       Juancho Yoder MD  03/20/19  12:44 PM    Time Spent on Discharge Activities: Greater than 30 minutes

## 2019-03-20 NOTE — PROGRESS NOTES
"    Patient Name: Joan Almanza  :1955  64 y.o.      Patient Care Team:  Juarez Winters MD as PCP - General (Family Medicine)    Chief Complaint: follow up AF with RVR    Interval History: No CP/SOB, wants to go home       Objective   Vital Signs  Temp:  [97.6 °F (36.4 °C)-98.2 °F (36.8 °C)] 98 °F (36.7 °C)  Heart Rate:  [74-95] 78  Resp:  [18-20] 18  BP: ()/(57-74) 114/74    Intake/Output Summary (Last 24 hours) at 3/20/2019 0954  Last data filed at 3/20/2019 0811  Gross per 24 hour   Intake 270 ml   Output 200 ml   Net 70 ml     Flowsheet Rows      First Filed Value   Admission Height  157.5 cm (62\") Documented at 2019 1050   Admission Weight  122 kg (270 lb) Documented at 2019 1050          Physical Exam:   General Appearance:    Alert, cooperative, in no acute distress   Lungs:     Clear to auscultation.  Normal respiratory effort and rate.      Heart:    irregular rhythm and normal rate, normal S1 and S2, no murmurs, gallops or rubs.     Chest Wall:    No abnormalities observed   Abdomen:     Soft, nontender, positive bowel sounds.     Extremities:   no cyanosis, clubbing or edema.  No marked joint deformities.  Adequate musculoskeletal strength.       Results Review:    Results from last 7 days   Lab Units 19  0550   SODIUM mmol/L 141   POTASSIUM mmol/L 3.7   CHLORIDE mmol/L 98   CO2 mmol/L 23.5   BUN mg/dL 28*   CREATININE mg/dL 1.06*   GLUCOSE mg/dL 90   CALCIUM mg/dL 6.9*         Results from last 7 days   Lab Units 19  0549   WBC 10*3/mm3 17.94*   HEMOGLOBIN g/dL 14.6   HEMATOCRIT % 43.4   PLATELETS 10*3/mm3 181         Results from last 7 days   Lab Units 19  0550   MAGNESIUM mg/dL 1.6                   Medication Review:     calcitriol 0.5 mcg Oral Q12H   calcium carbonate 4 tablet Oral TID   famotidine 40 mg Oral BID   magnesium sulfate 1 g Intravenous Q1H   metoprolol tartrate 25 mg Oral Q12H   polyethylene glycol 17 g Oral Daily   sennosides-docusate " sodium 2 tablet Oral Nightly   sodium chloride 3 mL Intravenous Q12H             Assessment/Plan   1.  PAF with RVR - on PO metoprolol, remains in afib rate 60s-80s, increases with minimal activity.   Will need to watch for bradycardia which is noted earlier in her admission.  Chads Vasc score  is elevated at (2). This indicates a 2.2% risk of CVA/year. Degree of risk of catastrophic intracranial bleed is 0-7%/year per neurosurgery, risk likely outweighs potential benefit.  2.  History of asymptomatic sinus bradycardia.  We will need to watch with the resumption of AV chaz blocker therapy, so far rate remains appropriate  3.  History of hypercalcemia and hyperparathyroidism, status parathyroidectomy, surgery followiing  4.  Hypertension - Hypotensive this morning, lasix, amlodipine and hydralazine stopped.  Hold parameters on metoprolol per hospitalist  5.  Meningioma - Neuro following  6.  Cranial AV fistula - neuro following    OK to d/c from our standpoint      Stephane Maldonado III, MD  [facility]  03/20/19  9:54 AM

## 2019-03-21 ENCOUNTER — READMISSION MANAGEMENT (OUTPATIENT)
Dept: CALL CENTER | Facility: HOSPITAL | Age: 64
End: 2019-03-21

## 2019-03-21 NOTE — OUTREACH NOTE
Prep Survey      Responses   Facility patient discharged from?  [facility]   Is patient eligible?  Yes   Discharge diagnosis  Diplopia secondary to intracranial meningioma with cerebral edema and 6th nerve palsy-   Does the patient have one of the following disease processes/diagnoses(primary or secondary)?  Other   Does the patient have Home health ordered?  No   Is there a DME ordered?  No   Prep survey completed?  Yes          Lissette Ramsey RN

## 2019-03-22 ENCOUNTER — READMISSION MANAGEMENT (OUTPATIENT)
Dept: CALL CENTER | Facility: HOSPITAL | Age: 64
End: 2019-03-22

## 2019-03-22 NOTE — OUTREACH NOTE
Medical Week 1 Survey      Responses   Facility patient discharged from?  [facility]   Does the patient have one of the following disease processes/diagnoses(primary or secondary)?  Other   Is there a successful TCM telephone encounter documented?  No   Week 1 attempt successful?  No   Unsuccessful attempts  Attempt 1          Mony Dalton RN

## 2019-03-22 NOTE — PROGRESS NOTES
Case Management Discharge Note    Final Note: Left AHMET Oliveros RN/CCP    Destination      No service has been selected for the patient.      Durable Medical Equipment      No service has been selected for the patient.      Dialysis/Infusion      No service has been selected for the patient.      Home Medical Care      No service has been selected for the patient.      Community Resources      No service has been selected for the patient.             Final Discharge Disposition Code: 07 - left [facility]

## 2019-03-23 ENCOUNTER — READMISSION MANAGEMENT (OUTPATIENT)
Dept: CALL CENTER | Facility: HOSPITAL | Age: 64
End: 2019-03-23

## 2019-03-23 NOTE — OUTREACH NOTE
Medical Week 1 Survey      Responses   Facility patient discharged from?  [facility]   Does the patient have one of the following disease processes/diagnoses(primary or secondary)?  Other   Is there a successful TCM telephone encounter documented?  No   Week 1 attempt successful?  No   Revoke  Decline to participate          Ruth Garcia RN

## 2019-03-27 ENCOUNTER — DOCUMENTATION (OUTPATIENT)
Dept: FAMILY MEDICINE CLINIC | Facility: CLINIC | Age: 64
End: 2019-03-27

## 2019-03-27 ENCOUNTER — OFFICE VISIT (OUTPATIENT)
Dept: FAMILY MEDICINE CLINIC | Facility: CLINIC | Age: 64
End: 2019-03-27

## 2019-03-27 VITALS
RESPIRATION RATE: 15 BRPM | DIASTOLIC BLOOD PRESSURE: 84 MMHG | TEMPERATURE: 98.7 F | WEIGHT: 264 LBS | OXYGEN SATURATION: 97 % | HEIGHT: 62 IN | HEART RATE: 92 BPM | SYSTOLIC BLOOD PRESSURE: 120 MMHG | BODY MASS INDEX: 48.58 KG/M2

## 2019-03-27 DIAGNOSIS — E83.51 HYPOCALCEMIA: ICD-10-CM

## 2019-03-27 DIAGNOSIS — H49.22 6TH NERVE PALSY, LEFT: ICD-10-CM

## 2019-03-27 DIAGNOSIS — D32.9 MENINGIOMA (HCC): ICD-10-CM

## 2019-03-27 DIAGNOSIS — I77.0 AVF (ARTERIOVENOUS FISTULA) (HCC): Primary | ICD-10-CM

## 2019-03-27 DIAGNOSIS — I48.91 ATRIAL FIBRILLATION, UNSPECIFIED TYPE (HCC): ICD-10-CM

## 2019-03-27 DIAGNOSIS — E83.51 HYPOCALCEMIA: Primary | ICD-10-CM

## 2019-03-27 DIAGNOSIS — E66.01 OBESITY, MORBID, BMI 40.0-49.9 (HCC): ICD-10-CM

## 2019-03-27 DIAGNOSIS — K21.9 GERD WITHOUT ESOPHAGITIS: ICD-10-CM

## 2019-03-27 PROBLEM — E83.52 HYPERCALCEMIA: Status: RESOLVED | Noted: 2019-03-07 | Resolved: 2019-03-27

## 2019-03-27 PROBLEM — I10 BP (HIGH BLOOD PRESSURE): Status: ACTIVE | Noted: 2019-03-27

## 2019-03-27 PROBLEM — J30.9 ALLERGIC RHINITIS: Status: ACTIVE | Noted: 2019-03-27

## 2019-03-27 PROBLEM — J45.909 AIRWAY HYPERREACTIVITY: Status: ACTIVE | Noted: 2019-03-27

## 2019-03-27 LAB
BUN SERPL-MCNC: 16 MG/DL (ref 8–23)
BUN/CREAT SERPL: 13.9 (ref 7–25)
CALCIUM SERPL-MCNC: 5.6 MG/DL (ref 8.6–10.5)
CHLORIDE SERPL-SCNC: 101 MMOL/L (ref 98–107)
CO2 SERPL-SCNC: 23.1 MMOL/L (ref 22–29)
CREAT SERPL-MCNC: 1.15 MG/DL (ref 0.57–1)
GLUCOSE SERPL-MCNC: 107 MG/DL (ref 65–99)
POTASSIUM SERPL-SCNC: 3.7 MMOL/L (ref 3.5–5.2)
SODIUM SERPL-SCNC: 143 MMOL/L (ref 136–145)

## 2019-03-27 PROCEDURE — 99214 OFFICE O/P EST MOD 30 MIN: CPT | Performed by: FAMILY MEDICINE

## 2019-03-27 NOTE — PROGRESS NOTES
HPI  Joan Almanza is a 64 y.o. female who is here for follow up recent hospitalization.  Apparently went to hospital because of visual disturbance and was found to have a meningioma and AV fistula.  Has follow-up appointment with neurosurgeon next week and strongly encouraged to keep that appointment.  Patient delayed her care because would not be eligible for insurance until June?  Apparently parathyroid tumors removed during the surgery and had some postoperative calcium deficiency.  Reportedly left hospital AMA.  Visual disturbances continue.  Again importance of neurosurgical follow-up.      Review of Systems   Eyes: Positive for visual disturbance.   All other systems reviewed and are negative.        Past Medical History:   Diagnosis Date   • Morbid obesity (CMS/HCC)    • Ovarian cyst    • Tachycardia        Past Surgical History:   Procedure Laterality Date   • ADENOIDECTOMY     • CHOLECYSTECTOMY     • OVARY SURGERY Right    • TONSILLECTOMY         No family history on file.    Social History     Socioeconomic History   • Marital status:      Spouse name: Not on file   • Number of children: Not on file   • Years of education: Not on file   • Highest education level: Not on file   Tobacco Use   • Smoking status: Never Smoker   • Smokeless tobacco: Never Used   Substance and Sexual Activity   • Alcohol use: No     Frequency: Never   • Drug use: No   • Sexual activity: Not Currently     Partners: Male         Physical Exam   Constitutional: She is oriented to person, place, and time. She appears well-developed and well-nourished. No distress.   HENT:   Head: Normocephalic.   Nose: Nose normal.   Mouth/Throat: Oropharynx is clear and moist.   Eyes: Pupils are equal, round, and reactive to light.   Neck: Normal range of motion.   Cardiovascular: Normal rate and regular rhythm.   Pulmonary/Chest: Effort normal.   Abdominal: Soft.   Musculoskeletal: Normal range of motion. She exhibits no edema or  deformity.   Neurological: She is alert and oriented to person, place, and time. Coordination normal.   Skin: Skin is warm and dry.   Psychiatric: She has a normal mood and affect. Her behavior is normal. Judgment and thought content normal.   Nursing note and vitals reviewed.        Assessment/Plan    Joan was seen today for brain tumor.    Diagnoses and all orders for this visit:    AVF (arteriovenous fistula) (CMS/[facility])    6th nerve palsy, left    Meningioma (CMS/[facility])    Hypocalcemia    Obesity, morbid, BMI 40.0-49.9 (CMS/[facility])        Patient here for follow-up after recent hospitalization apparently for diplopia related to meningioma and AV fistula?  Has follow-up appointment with neurosurgeon.  Also apparently had a parathyroid tumor removed and when left hospital calcium levels were somewhat low.  Was sent home on Calcitrol and Tums.  Will recheck calcium level and possibly adjust medication depending on results.  Healing surgical scar at the base of the neck.  Reviewed extensive medical history from hospital summary etc.  For now continue current therapy and strongly recommend follow-up with specialist especially neurosurgeon.  Will plan for follow-up office visit in 1 month.  Hopefully her insurance situation can be rectified by that time.    This note includes information entered using a voice recognition dictation system.  Though reviewed, some nonsensible errors may remain.

## 2019-03-28 DIAGNOSIS — E89.2 HYPOPARATHYROIDISM AFTER PROCEDURE (HCC): Primary | ICD-10-CM

## 2019-03-28 DIAGNOSIS — E83.51 HYPOCALCEMIA: ICD-10-CM

## 2019-03-29 ENCOUNTER — RESULTS ENCOUNTER (OUTPATIENT)
Dept: FAMILY MEDICINE CLINIC | Facility: CLINIC | Age: 64
End: 2019-03-29

## 2019-03-29 ENCOUNTER — APPOINTMENT (OUTPATIENT)
Dept: LAB | Facility: HOSPITAL | Age: 64
End: 2019-03-29

## 2019-03-29 DIAGNOSIS — E21.3 HYPERPARATHYROIDISM (HCC): ICD-10-CM

## 2019-03-29 DIAGNOSIS — E83.51 HYPOCALCEMIA: Primary | ICD-10-CM

## 2019-03-29 DIAGNOSIS — E89.2 HYPOPARATHYROIDISM AFTER PROCEDURE (HCC): ICD-10-CM

## 2019-03-29 DIAGNOSIS — E83.51 HYPOCALCEMIA: ICD-10-CM

## 2019-03-29 LAB
ALBUMIN SERPL-MCNC: 3.6 G/DL (ref 3.5–5.2)
ANION GAP SERPL CALCULATED.3IONS-SCNC: 15.2 MMOL/L
ANION GAP SERPL CALCULATED.3IONS-SCNC: 15.2 MMOL/L
BUN BLD-MCNC: 18 MG/DL (ref 8–23)
BUN BLD-MCNC: 18 MG/DL (ref 8–23)
BUN/CREAT SERPL: 19.4 (ref 7–25)
BUN/CREAT SERPL: 19.4 (ref 7–25)
CALCIUM SPEC-SCNC: 9.1 MG/DL (ref 8.6–10.5)
CALCIUM SPEC-SCNC: 9.1 MG/DL (ref 8.6–10.5)
CHLORIDE SERPL-SCNC: 104 MMOL/L (ref 98–107)
CHLORIDE SERPL-SCNC: 104 MMOL/L (ref 98–107)
CO2 SERPL-SCNC: 25.8 MMOL/L (ref 22–29)
CO2 SERPL-SCNC: 25.8 MMOL/L (ref 22–29)
CREAT BLD-MCNC: 0.93 MG/DL (ref 0.57–1)
CREAT BLD-MCNC: 0.93 MG/DL (ref 0.57–1)
GFR SERPL CREATININE-BSD FRML MDRD: 61 ML/MIN/1.73
GFR SERPL CREATININE-BSD FRML MDRD: 61 ML/MIN/1.73
GLUCOSE BLD-MCNC: 116 MG/DL (ref 65–99)
GLUCOSE BLD-MCNC: 116 MG/DL (ref 65–99)
MAGNESIUM SERPL-MCNC: 1.3 MG/DL (ref 1.6–2.4)
PHOSPHATE SERPL-MCNC: 4.5 MG/DL (ref 2.5–4.5)
POTASSIUM BLD-SCNC: 3.9 MMOL/L (ref 3.5–5.2)
POTASSIUM BLD-SCNC: 3.9 MMOL/L (ref 3.5–5.2)
SODIUM BLD-SCNC: 145 MMOL/L (ref 136–145)
SODIUM BLD-SCNC: 145 MMOL/L (ref 136–145)

## 2019-03-29 PROCEDURE — 83735 ASSAY OF MAGNESIUM: CPT | Performed by: FAMILY MEDICINE

## 2019-03-29 PROCEDURE — 80069 RENAL FUNCTION PANEL: CPT | Performed by: FAMILY MEDICINE

## 2019-03-29 PROCEDURE — 36415 COLL VENOUS BLD VENIPUNCTURE: CPT

## 2019-03-29 PROCEDURE — 80048 BASIC METABOLIC PNL TOTAL CA: CPT | Performed by: FAMILY MEDICINE

## 2019-04-04 NOTE — TELEPHONE ENCOUNTER
Patient: Sascha Arora 

## 2019-04-09 ENCOUNTER — OFFICE VISIT (OUTPATIENT)
Dept: NEUROSURGERY | Facility: CLINIC | Age: 64
End: 2019-04-09

## 2019-04-09 VITALS
DIASTOLIC BLOOD PRESSURE: 92 MMHG | SYSTOLIC BLOOD PRESSURE: 140 MMHG | HEIGHT: 62 IN | HEART RATE: 80 BPM | BODY MASS INDEX: 49.87 KG/M2 | WEIGHT: 271 LBS | RESPIRATION RATE: 20 BRPM

## 2019-04-09 DIAGNOSIS — E66.01 OBESITY, MORBID, BMI 40.0-49.9 (HCC): ICD-10-CM

## 2019-04-09 DIAGNOSIS — R90.89 ABNORMAL BRAIN MRI: ICD-10-CM

## 2019-04-09 DIAGNOSIS — I48.91 ATRIAL FIBRILLATION, UNSPECIFIED TYPE (HCC): ICD-10-CM

## 2019-04-09 DIAGNOSIS — I77.0 AVF (ARTERIOVENOUS FISTULA) (HCC): Primary | ICD-10-CM

## 2019-04-09 DIAGNOSIS — H49.22 6TH NERVE PALSY, LEFT: ICD-10-CM

## 2019-04-09 DIAGNOSIS — G93.6 CEREBRAL EDEMA (HCC): ICD-10-CM

## 2019-04-09 DIAGNOSIS — E21.3 HYPERPARATHYROIDISM (HCC): ICD-10-CM

## 2019-04-09 PROCEDURE — 99215 OFFICE O/P EST HI 40 MIN: CPT | Performed by: NEUROLOGICAL SURGERY

## 2019-04-09 RX ORDER — CEFAZOLIN SODIUM IN 0.9 % NACL 3 G/100 ML
3 INTRAVENOUS SOLUTION, PIGGYBACK (ML) INTRAVENOUS ONCE
Status: CANCELLED | OUTPATIENT
Start: 2019-04-29 | End: 2019-04-09

## 2019-04-09 RX ORDER — FAMOTIDINE 10 MG
20 TABLET ORAL
Status: CANCELLED | OUTPATIENT
Start: 2019-04-29

## 2019-04-09 RX ORDER — SODIUM CHLORIDE 9 MG/ML
100 INJECTION, SOLUTION INTRAVENOUS CONTINUOUS
Status: CANCELLED | OUTPATIENT
Start: 2019-04-29

## 2019-04-09 RX ORDER — DEXAMETHASONE SODIUM PHOSPHATE 4 MG/ML
4 INJECTION, SOLUTION INTRA-ARTICULAR; INTRALESIONAL; INTRAMUSCULAR; INTRAVENOUS; SOFT TISSUE EVERY 6 HOURS
Status: CANCELLED | OUTPATIENT
Start: 2019-04-29 | End: 2019-04-29

## 2019-04-09 NOTE — PROGRESS NOTES
"Subjective   Patient ID: Joan Almanza is a 64 y.o. female is here today for a hospital follow-up for AVF/brain mass 3/4. Pt is accompanied by her friend Dimple.    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 She has had no new imaging.  She is here for surgical discussion.    She was initially seen 1 month ago with 1 week of vision changes.  She also had pulsation sounds in the right ear and a frontal pressure HA that worsened when louking up.  MRI and MRA head imaging revealed the imaging abnormalities during her hospitalization.    She continues to report constant double vision out of the left eye.  She intermittently has a swishing sensation that she can hear in the right ear.  She overall reports feeling very anxious and has had intermittent palpitations worse at night with her heart.  She also describes a \"furry\" sensation on her face and over her feet that occurs intermittently.  She denies any sensory changes in her face but does report that food \"tastes horrible.\"  She denies any falls, balance or gait issues.  She continues to ambulate with a straight cane.  She denies any blurred vision or visual field deficits.    She presents with a friend.      /92 (BP Location: Right arm, Patient Position: Sitting, Cuff Size: Large Adult)   Pulse 80   Resp 20   Ht 157.5 cm (62\")   Wt 123 kg (271 lb)   BMI 49.57 kg/m²     The following portions of the patient's history were reviewed and updated as appropriate: allergies, current medications, past family history, past medical history, past social history, past surgical history and problem list.    Review of Systems   Constitutional: Negative for chills and fever.   Eyes: Positive for visual disturbance.   Genitourinary: Positive for difficulty urinating. Negative for enuresis.   Skin:        Scalp feels " like fur.   Neurological: Positive for dizziness (R eye double vision), weakness, light-headedness, numbness (scalp) and headaches. Negative for tremors, seizures, syncope, facial asymmetry and speech difficulty.   Psychiatric/Behavioral: Positive for confusion and decreased concentration.     Past Medical History:   Diagnosis Date   • Irregular heart beat    • Morbid obesity (CMS/HCC)    • Ovarian cyst    • Poor circulation    • Tachycardia        Past Surgical History:   Procedure Laterality Date   • ADENOIDECTOMY     • CHOLECYSTECTOMY     • OOPHORECTOMY      1999   • OVARY SURGERY Right    • THYROIDECTOMY      [place]   • TONSILLECTOMY         Social History     Socioeconomic History   • Marital status:      Spouse name: Not on file   • Number of children: Not on file   • Years of education: Not on file   • Highest education level: Not on file   Occupational History   • Occupation: retired   Tobacco Use   • Smoking status: Never Smoker   • Smokeless tobacco: Never Used   Substance and Sexual Activity   • Alcohol use: No     Frequency: Never   • Drug use: No   • Sexual activity: Not Currently     Partners: Male       Family History   Problem Relation Age of Onset   • Diabetes Mother    • Cancer Mother    • Stroke Mother    • Heart disease Father    • Diabetes Brother         No Known Allergies      Current Outpatient Medications:   •  Acetaminophen (TYLENOL PO), Take  by mouth., Disp: , Rfl:   •  calcium carbonate (TUMS) 500 MG chewable tablet, Chew 2,000 mg 3 (Three) Times a Day., Disp: 360 tablet, Rfl: 0  •  metoprolol tartrate (LOPRESSOR) 25 MG tablet, Take 1 tablet by mouth Every 12 (Twelve) Hours., Disp: 60 tablet, Rfl: 0    Objective   Physical Exam   Constitutional: She is oriented to person, place, and time. She appears well-developed and well-nourished. She is cooperative.  Non-toxic appearance. She does not have a sickly appearance. She does not appear ill.   Pleasant morbidly obese middle-aged  female   HENT:   Head: Normocephalic and atraumatic.   Very poor dentition with blackened appearance to lower teeth   Eyes: EOM are normal. Pupils are equal, round, and reactive to light.   Neck: Neck supple. No tracheal deviation present.   Pulmonary/Chest: Effort normal.   Abdominal:   Abdominal pannus   Musculoskeletal: Normal range of motion.   Moving all extremities well   Neurological: She is alert and oriented to person, place, and time. She displays tremor (Very minimal right upper extremity tremor). A cranial nerve deficit is present. No sensory deficit. Gait abnormal. GCS eye subscore is 4. GCS verbal subscore is 5. GCS motor subscore is 6.   Gait is slow, purposeful and wide-based, using a straight cane  Able to stand on heels and toes with assistance  Negative drift bilateral upper extremities  Finger to nose intact   Skin: Skin is warm and dry.   Psychiatric: Her speech is normal.   Vitals reviewed.    Neurologic Exam     Mental Status   Oriented to person, place, and time.   Oriented to person.   Oriented to place.   Attention: normal. Concentration: normal.   Speech: speech is normal   Level of consciousness: alert    Cranial Nerves     CN II   Visual fields full to confrontation.   Visual acuity: normal  Right visual field deficit: none  Left visual field deficit: none     CN III, IV, VI   Pupils are equal, round, and reactive to light.  Extraocular motions are normal.   Right pupil: Size: 3 mm. Shape: regular. Reactivity: brisk.   Left pupil: Size: 3 mm. Shape: regular. Reactivity: brisk.   CN III: no CN III palsy  CN VI: no CN VI palsy  Nystagmus: none   Diplopia: left    CN V   Facial sensation intact.   Right facial sensation deficit: none  Left facial sensation deficit: none    CN VII   Facial expression full, symmetric.   Right facial weakness: none  Left facial weakness: none    CN VIII   CN VIII normal.   Hearing: intact    CN IX, X   CN IX normal.   Palate: symmetric    CN XI   CN XI normal.  "  Right sternocleidomastoid strength: normal  Left sternocleidomastoid strength: normal    CN XII   CN XII normal.   Tongue: not atrophic  Fasciculations: absent  Tongue deviation: none      Assessment/Plan   Independent Review of Radiographic Studies:    I personally reviewed the MR I and MRA done of the brain: This demonstrates a 2.6 cm left medial temporal fossa lesion adjacent to the cavernous sinus consistent with a meningioma.  Additionally in the right parietal area there is about a 6 mm avidly enhancing and homogeneously enhancing lesion consistent with meningioma.    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      Medical Decision Making:    I confirmed and obtained the above history as recorded by the nurse practitioner acting as a scribe. I performed the above examination and it is documented by the nurse practitioner acting as a scribe.    I explained to the patient for the natural history of an arteriovenous fistula includes hemorrhage and death.  I explained that this risk is between 7 and 10% on a yearly basis.    I explained the treatment of this exists which more than likely can be accomplished from an endovascular standpoint-from within the blood vessels.    I explained the endovascular procedure and the use of \"glue\".    I explained the stepwise approach to treatment and I explained that treatment of the arteriovenous malformation may require more than one session of embolization.  I explained that the embolization session usually will last up to 3 hours.    I then explained that following treatment of the arteriovenous malformation, which poses a risk to her that is substantially higher than the risk of the tumors, then treatment for the tumors would be addressed.  I did briefly mention the possibility of craniotomy or radiosurgery.    The risks, benefits and alternatives of cerebral " angiography with embolization of the AVM were explained in detail to the patient. The alternative is not to undergo this procedure. The benefit of cerebral angiography with embolization should be, though is not guaranteed, the elucidation of the vascular anatomy of the brain and the blood vessels leading to the brain, and placement of a series of coils, glue and/or particles into the AVM, in order to prevent blood from flowing into it, thus potentially preventing rupture of the AVM, or to reduce its size so that other treatment such as radiosurgery or craniotomy may be safer. These devices will remain in the AVM permanently. The risks of the procedure include, but are not limited to, the possibility of stroke, bleeding, blindness, damage to an artery in the brain, groin or neck (possibly requiring surgery to correct), infection, reaction to the contrast dye resulting in itching, swelling, anaphylaxis or even death, lack of ability to perform the procedure, need for further operative intervention, lack of ability to completely obliterate the AVM, need for further embolization treatment or even open surgery to obliterate the AVM. The patient voiced understanding of the risks, benefits and alternatives and requests that we proceed with cerebral angiography with embolization of AVM.    After a complete physical exam, the patient has been informed of the consequences, benefits, appropriate us, and office policies regarding the medication being prescribed. A GLORIA check will be made on-line, and will be repeated if prescription is renewed after a 90 day period. The patient agrees to adhering to the medication regimen as prescribed.    The patient has been advised that we will manage post-operative pain for 1 month. If further narcotic medication is needed beyond that period, a referral back to the primary care physician or to a pain management specialist will be made. If the patient cancels or fails to show for scheduled  follow-up visits or the pain management referral,  further narcotic prescriptions from this practice may cease.    Plan: Cerebral angiography and embolization of arteriovenous malformation    Joan was seen today for hfu avf/brain mass 3/4.    Diagnoses and all orders for this visit:    AVF (arteriovenous fistula) (CMS/HCC)  -     Case Request; Standing  -     CBC and Differential; Future  -     Basic metabolic panel; Future  -     XR chest 1 vw; Future  -     sodium chloride 0.9 % infusion  -     famotidine (PEPCID) tablet 20 mg  -     Sedimentation rate; Future  -     ceFAZolin (ANCEF) 3 g in sodium chloride 0.9 % 100 mL IVPB  -     dexamethasone (DECADRON) 4 mg in sodium chloride 0.9 % IVPB  -     Case Request    Abnormal brain MRI consistent with right transverse sigmoid sinus junction arteriovenous fistula and left medial temporal fossa/cavernous sinus lesion consistent with meningioma and small 6 mm right s    Obesity, morbid, BMI 40.0-49.9 (CMS/HCC)    Hyperparathyroidism (CMS/HCC)    6th nerve palsy, left    Cerebral edema (CMS/HCC)    Atrial fibrillation, unspecified type (CMS/HCC)    Other orders  -     Follow anesthesia standing orders.  -     Obtain informed consent  -     Provide NPO Instructions to Patient; Future  -     Clorhexidine skin prep; Future  -     Follow Anesthesia Guidelines / Standing Orders; Standing  -     Obtain informed consent; Standing  -     SCD (sequential compression device)- to be placed on patient in Pre-op; Standing  -     Clip operative site; Standing  -     Inpatient Admission; Standing      Return for Recheck 2 weeks after surgery.

## 2019-04-14 ENCOUNTER — RESULTS ENCOUNTER (OUTPATIENT)
Dept: NEUROSURGERY | Facility: CLINIC | Age: 64
End: 2019-04-14

## 2019-04-14 DIAGNOSIS — I77.0 AVF (ARTERIOVENOUS FISTULA) (HCC): ICD-10-CM

## 2019-04-16 RX ORDER — FUROSEMIDE 40 MG/1
40 TABLET ORAL DAILY PRN
Qty: 30 TABLET | Refills: 2 | Status: SHIPPED | OUTPATIENT
Start: 2019-04-16 | End: 2020-09-22

## 2019-04-22 ENCOUNTER — TELEPHONE (OUTPATIENT)
Dept: NEUROSURGERY | Facility: CLINIC | Age: 64
End: 2019-04-22

## 2019-04-22 ENCOUNTER — HOSPITAL ENCOUNTER (OUTPATIENT)
Dept: GENERAL RADIOLOGY | Facility: HOSPITAL | Age: 64
Discharge: HOME | End: 2019-04-22
Admitting: NEUROLOGICAL SURGERY

## 2019-04-22 ENCOUNTER — APPOINTMENT (OUTPATIENT)
Dept: PREADMISSION TESTING | Facility: HOSPITAL | Age: 64
End: 2019-04-22

## 2019-04-22 VITALS
WEIGHT: 270.06 LBS | OXYGEN SATURATION: 99 % | HEIGHT: 62 IN | SYSTOLIC BLOOD PRESSURE: 106 MMHG | BODY MASS INDEX: 49.7 KG/M2 | TEMPERATURE: 98 F | DIASTOLIC BLOOD PRESSURE: 69 MMHG | RESPIRATION RATE: 16 BRPM | HEART RATE: 102 BPM

## 2019-04-22 DIAGNOSIS — I77.0 AVF (ARTERIOVENOUS FISTULA) (HCC): ICD-10-CM

## 2019-04-22 LAB
ANION GAP SERPL CALCULATED.3IONS-SCNC: 17.2 MMOL/L
BASOPHILS # BLD AUTO: 0.03 10*3/MM3 (ref 0–0.2)
BASOPHILS NFR BLD AUTO: 0.2 % (ref 0–1.5)
BUN BLD-MCNC: 9 MG/DL (ref 8–23)
BUN/CREAT SERPL: 8.7 (ref 7–25)
CALCIUM SPEC-SCNC: 5.7 MG/DL (ref 8.6–10.5)
CHLORIDE SERPL-SCNC: 96 MMOL/L (ref 98–107)
CO2 SERPL-SCNC: 27.8 MMOL/L (ref 22–29)
CREAT BLD-MCNC: 1.03 MG/DL (ref 0.57–1)
DEPRECATED RDW RBC AUTO: 48.8 FL (ref 37–54)
EOSINOPHIL # BLD AUTO: 0.18 10*3/MM3 (ref 0–0.4)
EOSINOPHIL NFR BLD AUTO: 1.2 % (ref 0.3–6.2)
ERYTHROCYTE [DISTWIDTH] IN BLOOD BY AUTOMATED COUNT: 14.5 % (ref 12.3–15.4)
ERYTHROCYTE [SEDIMENTATION RATE] IN BLOOD: 70 MM/HR (ref 0–30)
GFR SERPL CREATININE-BSD FRML MDRD: 54 ML/MIN/1.73
GLUCOSE BLD-MCNC: 131 MG/DL (ref 65–99)
HCT VFR BLD AUTO: 42 % (ref 34–46.6)
HGB BLD-MCNC: 13.3 G/DL (ref 12–15.9)
IMM GRANULOCYTES # BLD AUTO: 0.1 10*3/MM3 (ref 0–0.05)
IMM GRANULOCYTES NFR BLD AUTO: 0.6 % (ref 0–0.5)
LYMPHOCYTES # BLD AUTO: 0.97 10*3/MM3 (ref 0.7–3.1)
LYMPHOCYTES NFR BLD AUTO: 6.3 % (ref 19.6–45.3)
MCH RBC QN AUTO: 29 PG (ref 26.6–33)
MCHC RBC AUTO-ENTMCNC: 31.7 G/DL (ref 31.5–35.7)
MCV RBC AUTO: 91.5 FL (ref 79–97)
MONOCYTES # BLD AUTO: 1.33 10*3/MM3 (ref 0.1–0.9)
MONOCYTES NFR BLD AUTO: 8.6 % (ref 5–12)
NEUTROPHILS # BLD AUTO: 12.87 10*3/MM3 (ref 1.7–7)
NEUTROPHILS NFR BLD AUTO: 83.1 % (ref 42.7–76)
NRBC BLD AUTO-RTO: 0 /100 WBC (ref 0–0.2)
PLATELET # BLD AUTO: 232 10*3/MM3 (ref 140–450)
PMV BLD AUTO: 10.5 FL (ref 6–12)
POTASSIUM BLD-SCNC: 3.9 MMOL/L (ref 3.5–5.2)
RBC # BLD AUTO: 4.59 10*6/MM3 (ref 3.77–5.28)
SODIUM BLD-SCNC: 141 MMOL/L (ref 136–145)
WBC NRBC COR # BLD: 15.48 10*3/MM3 (ref 3.4–10.8)

## 2019-04-22 PROCEDURE — 93005 ELECTROCARDIOGRAM TRACING: CPT

## 2019-04-22 PROCEDURE — 85652 RBC SED RATE AUTOMATED: CPT | Performed by: NEUROLOGICAL SURGERY

## 2019-04-22 PROCEDURE — 71045 X-RAY EXAM CHEST 1 VIEW: CPT

## 2019-04-22 PROCEDURE — 36415 COLL VENOUS BLD VENIPUNCTURE: CPT | Performed by: NEUROLOGICAL SURGERY

## 2019-04-22 PROCEDURE — 80048 BASIC METABOLIC PNL TOTAL CA: CPT | Performed by: NEUROLOGICAL SURGERY

## 2019-04-22 PROCEDURE — 93010 ELECTROCARDIOGRAM REPORT: CPT | Performed by: INTERNAL MEDICINE

## 2019-04-22 PROCEDURE — 85025 COMPLETE CBC W/AUTO DIFF WBC: CPT | Performed by: NEUROLOGICAL SURGERY

## 2019-04-22 RX ORDER — ACETAMINOPHEN 325 MG/1
TABLET ORAL EVERY 6 HOURS PRN
COMMUNITY
End: 2020-09-22

## 2019-04-22 NOTE — TELEPHONE ENCOUNTER
PAT called with critical finding of calcim. She is scheduled next week for an embolization. Can you look at it and advise if I need to do anything? Thanks!

## 2019-04-22 NOTE — TELEPHONE ENCOUNTER
Please call and tell the pt's PCP re: critical ca level of 5.7. This must be addressed before surgery. Thanks!

## 2019-04-22 NOTE — DISCHARGE INSTRUCTIONS
Take the following medications the morning of surgery with a small sip of water:    METOPROLOL    General Instructions:  • Do not eat or drink anything after midnight the night before surgery.  • Patients who avoid smoking, chewing tobacco and alcohol for 4 weeks prior to surgery have a reduced risk of post-operative complications.  Quit smoking as many days before surgery as you can.  • Do not smoke, use chewing tobacco or drink alcohol the day of surgery.   • If applicable bring your C-PAP/ BI-PAP machine.  • Bring any papers given to you in the doctor’s office.  • Wear clean comfortable clothes and socks.  • Do not wear contact lenses, false eyelashes or make-up.  Bring a case for your glasses.   • Bring crutches or walker if applicable.  • Remove all piercings.  Leave jewelry and any other valuables at home.  • Hair extensions with metal clips must be removed prior to surgery.  • The Pre-Admission Testing nurse will instruct you to bring medications if unable to obtain an accurate list in Pre-Admission Testing.      Preventing a Surgical Site Infection:  • For 2 to 3 days before surgery, avoid shaving with a razor because the razor can irritate skin and make it easier to develop an infection.    • Any areas of open skin can increase the risk of a post-operative wound infection by allowing bacteria to enter and travel throughout the body.  Notify your surgeon if you have any skin wounds / rashes even if it is not near the expected surgical site.  The area will need assessed to determine if surgery should be delayed until it is healed.  • The night prior to surgery sleep in a clean bed with clean clothing.  Do not allow pets to sleep with you.  • Shower on the morning of surgery using a fresh bar of anti-bacterial soap (such as Dial) and clean washcloth.  Dry with a clean towel and dress in clean clothing.  • Ask your surgeon if you will be receiving antibiotics prior to surgery.  • Make sure you, your family, and  all healthcare providers clean their hands with soap and water or an alcohol based hand  before caring for you or your wound.    Day of surgery:04- arrive @ 0600 AM REPORT TO THE MAIN OR   Upon arrival, a Pre-op nurse and Anesthesiologist will review your health history, obtain vital signs, and answer questions you may have.  The only belongings needed at this time will be your home medications and if applicable your C-PAP/BI-PAP machine.  If you are staying overnight your family can leave the rest of your belongings in the car and bring them to your room later.  A Pre-op nurse will start an IV and you may receive medication in preparation for surgery, including something to help you relax.  Your family will be able to see you in the Pre-op area.  While you are in surgery your family should notify the waiting room  if they leave the waiting room area and provide a contact phone number.    Please be aware that surgery does come with discomfort.  We want to make every effort to control your discomfort so please discuss any uncontrolled symptoms with your nurse.   Your doctor will most likely have prescribed pain medications.      If you are going home after surgery you will receive individualized written care instructions before being discharged.  A responsible adult must drive you to and from the hospital on the day of your surgery and stay with you for 24 hours.    If you are staying overnight following surgery, you will be transported to your hospital room following the recovery period.  [facility] has all private rooms.    You have received a list of surgical assistants for your reference.  If you have any questions please call Pre-Admission Testing at [phone].  Deductibles and co-payments are collected on the day of service. Please be prepared to pay the required co-pay, deductible or deposit on the day of service as defined by your plan.  2% CHLORAHEXIDINE GLUCONATE*  CLOTH  Preparing or “prepping” skin before surgery can reduce the risk of infection at the surgical site. To make the process easier, [facility] has chosen disposable cloths moistened with a rinse-free, 2% Chlorhexidine Gluconate (CHG) antiseptic solution. The steps below outline the prepping process and should be carefully followed.        Use the prep cloth on the area that is circled in the diagram             Directions Night before Surgery 04- pm prior to surgery  1) Shower using a fresh bar of anti-bacterial soap (such as Dial) and clean washcloth.  Use a clean towel to completely dry your skin.  2) Do not use any lotions, oils or creams on your skin.  3) Open the package and remove 1 cloth, wipe your skin for 30 seconds in a circular motion.  Allow to dry for 3 minutes.  4) Repeat #3 with second cloth.  5) Do not touch your eyes, ears, or mouth with the prep cloth.  6) Allow the wet prep solution to air dry.  7) Discard the prep cloth and wash your hands with soap and water.   8) Dress in clean bed clothes and sleep on fresh clean bed sheets.   9) You may experience some temporary itching after the prep.    Directions Day of Surgery 04- am prior to surgery   1) Repeat steps 1,2,3,4,5,6,7, and 9.   2) Dress in clean clothes before coming to the hospital.

## 2019-04-23 ENCOUNTER — TELEPHONE (OUTPATIENT)
Dept: NEUROSURGERY | Facility: CLINIC | Age: 64
End: 2019-04-23

## 2019-04-23 RX ORDER — CALCITRIOL 0.25 UG/1
0.25 CAPSULE, LIQUID FILLED ORAL DAILY
Qty: 30 CAPSULE | Refills: 0 | Status: SHIPPED | OUTPATIENT
Start: 2019-04-23 | End: 2020-09-22

## 2019-04-23 NOTE — TELEPHONE ENCOUNTER
My name is Antonio and I am Dr. Bhakta Surgery Scheduler. Patient is scheduled April 29 for an embolization of AVM with Dr. Bhakta. She had a critical calcium level of 5.7 on her pre-op testing. Can you review her labs and address these findings? We will need an ok from you in order to proceed with surgery. Thank you!

## 2019-04-23 NOTE — TELEPHONE ENCOUNTER
Talked with patient.  He admits has not been taking Tums as directed.  Recommend resuming Tums and will send prescription for vitamin D3.  Told to go to hospital lab Friday morning and will recheck BMP.

## 2019-04-23 NOTE — TELEPHONE ENCOUNTER
Thank you. I have put her surgery temporarily on hold until I hear word that she is ok to proceed and then I will reschedule. Spoke with patient and let her know that as well.

## 2019-04-29 ENCOUNTER — TELEPHONE (OUTPATIENT)
Dept: CARDIOLOGY | Facility: CLINIC | Age: 64
End: 2019-04-29

## 2019-04-29 ENCOUNTER — TELEPHONE (OUTPATIENT)
Dept: NEUROSURGERY | Facility: CLINIC | Age: 64
End: 2019-04-29

## 2019-04-29 NOTE — TELEPHONE ENCOUNTER
Pt needs to be cleared by Dr. Winters because of critical calcium. When I followed up on it with Dr. Winters they had not scheduled her. Scheduled appt with Dr. Winters for Wed may 1 at 3:30 and LM for her to call me regarding this. She also needs cardiac clearance and have a message into cardiology upstairs for that.

## 2019-05-01 ENCOUNTER — TELEPHONE (OUTPATIENT)
Dept: FAMILY MEDICINE CLINIC | Facility: CLINIC | Age: 64
End: 2019-05-01

## 2019-05-01 NOTE — TELEPHONE ENCOUNTER
Talked with patient and informed very important to get calcium level rechecked.  Recommend going to [facility] outpatient or closest facility.  Will put order in epic if patient able to get to [facility]?

## 2019-06-27 ENCOUNTER — TELEPHONE (OUTPATIENT)
Dept: NEUROSURGERY | Facility: CLINIC | Age: 64
End: 2019-06-27

## 2019-06-27 NOTE — TELEPHONE ENCOUNTER
Called Dr. Winters's office for clearance for embolization and spoke with Ruth. We had been waiting on clearance due to abnormal labs in the past. She checked with Dr. Winters and pt was supposed to have labs and follow up 2 months ago but did not. Ruth called patient to schedule appt for clearance. Pt said she was going to call her back to schedule.

## 2019-09-04 ENCOUNTER — TELEPHONE (OUTPATIENT)
Dept: FAMILY MEDICINE CLINIC | Facility: CLINIC | Age: 64
End: 2019-09-04

## 2019-09-04 NOTE — TELEPHONE ENCOUNTER
BRENNON WITH DR. HERNANDEZ OFFICE CALLED TO SAY SINCE HE IS NO LONGER WITH [facility] THEY WILL NO LONGER BE ABLE TO TREAT HER FOR HER ANEURYSM. BRENNON IS CONCERNED ABOUT PT BEING TREATED FOR THIS AND WANTED YOU TO BE AWARE SHE NEVER FOLLOWED UP WHEN NEEDING SURGERY CLEARANCE.  PLEASE ADVISE

## 2019-09-04 NOTE — TELEPHONE ENCOUNTER
Discussed with patient situation with neurosurgeon.  She decided not to have surgical intervention because of fear of hospitals etc.  Risks discussed.  Does need follow-up appointment at our office to recheck calcium level etc. told to call for appointment as soon as possible

## 2020-09-22 ENCOUNTER — OFFICE VISIT (OUTPATIENT)
Dept: FAMILY MEDICINE CLINIC | Facility: CLINIC | Age: 65
End: 2020-09-22

## 2020-09-22 VITALS
OXYGEN SATURATION: 98 % | RESPIRATION RATE: 20 BRPM | WEIGHT: 293 LBS | TEMPERATURE: 96.1 F | BODY MASS INDEX: 57.52 KG/M2 | DIASTOLIC BLOOD PRESSURE: 100 MMHG | HEIGHT: 60 IN | HEART RATE: 70 BPM | SYSTOLIC BLOOD PRESSURE: 130 MMHG

## 2020-09-22 DIAGNOSIS — I48.91 ATRIAL FIBRILLATION, UNSPECIFIED TYPE (HCC): ICD-10-CM

## 2020-09-22 DIAGNOSIS — E21.3 HYPERPARATHYROIDISM (HCC): ICD-10-CM

## 2020-09-22 DIAGNOSIS — R90.89 ABNORMAL BRAIN MRI: ICD-10-CM

## 2020-09-22 DIAGNOSIS — K21.9 GERD WITHOUT ESOPHAGITIS: ICD-10-CM

## 2020-09-22 DIAGNOSIS — Z79.899 HIGH RISK MEDICATION USE: ICD-10-CM

## 2020-09-22 DIAGNOSIS — E66.01 OBESITY, MORBID, BMI 40.0-49.9 (HCC): ICD-10-CM

## 2020-09-22 DIAGNOSIS — E66.01 CLASS 3 SEVERE OBESITY DUE TO EXCESS CALORIES WITHOUT SERIOUS COMORBIDITY WITH BODY MASS INDEX (BMI) OF 50.0 TO 59.9 IN ADULT (HCC): ICD-10-CM

## 2020-09-22 DIAGNOSIS — I77.0 AVF (ARTERIOVENOUS FISTULA) (HCC): ICD-10-CM

## 2020-09-22 DIAGNOSIS — I15.8 OTHER SECONDARY HYPERTENSION: Primary | ICD-10-CM

## 2020-09-22 PROBLEM — E66.09 OBESITY DUE TO EXCESS CALORIES WITHOUT SERIOUS COMORBIDITY: Status: ACTIVE | Noted: 2019-03-12

## 2020-09-22 PROCEDURE — 99214 OFFICE O/P EST MOD 30 MIN: CPT | Performed by: FAMILY MEDICINE

## 2020-09-22 PROCEDURE — 90694 VACC AIIV4 NO PRSRV 0.5ML IM: CPT | Performed by: FAMILY MEDICINE

## 2020-09-22 PROCEDURE — 90471 IMMUNIZATION ADMIN: CPT | Performed by: FAMILY MEDICINE

## 2020-09-22 NOTE — PROGRESS NOTES
HPI  Joan Almanza is a 65 y.o. female who is here for follow up of known brain abnormalities.  History is somewhat complicated including removal of parathyroid tumors?  Patient apparently became upset during hospitalization and left AMA.  Reportedly told no tumors were found but removed anyway?  Calcium levels decreased.  Patient did not return for follow-up and basically has stopped all medication and remained fairly stable.  Continues to care for her  who has heart failure and multiple other issues.  This includes a fall with hip fracture several months ago.  Also of note patient's previous neurosurgeon left his practice and she had not been able to follow-up with MRI abnormalities.  Was having double vision and other issues which seem to have resolved.  Apparently she had decided against surgical intervention?      Review of Systems   All other systems reviewed and are negative.        Past Medical History:   Diagnosis Date   • Arthritis     OSTEO   • Atrial fibrillation (CMS/HCC) 2019    DR. ELANA CANELA   • Brain tumor (benign) (CMS/HCC)    • Hx of being hospitalized 03/06/2019    DSG WITH BRAIN TUMOR-PARATHYROID REMOVED   • Hypertension    • Irregular heart beat    • Lymph edema    • Morbid obesity (CMS/HCC)    • Neuropathy     HANDS FEET AND FACE   • Ovarian cyst    • Poor circulation    • Tachycardia        Past Surgical History:   Procedure Laterality Date   • ADENOIDECTOMY     • CHOLECYSTECTOMY     • OOPHORECTOMY      1999   • OVARY SURGERY Right    • TONSILLECTOMY     • TOTAL THYROIDECTOMY         Family History   Problem Relation Age of Onset   • Diabetes Mother    • Cancer Mother    • Stroke Mother    • Heart disease Father    • Diabetes Brother    • Malig Hyperthermia Neg Hx        Social History     Socioeconomic History   • Marital status:      Spouse name: Not on file   • Number of children: Not on file   • Years of education: Not on file   • Highest education level: Not on file    Occupational History   • Occupation: retired   Tobacco Use   • Smoking status: Never Smoker   • Smokeless tobacco: Never Used   Substance and Sexual Activity   • Alcohol use: No     Frequency: Never   • Drug use: No   • Sexual activity: Not Currently     Partners: Male       Vitals:    09/22/20 1501   BP: 130/100   Pulse: 70   Resp: 20   Temp: 96.1 °F (35.6 °C)   SpO2: 98%        Body mass index is 59.21 kg/m².      Physical Exam  Vitals signs and nursing note reviewed.   Constitutional:       General: She is not in acute distress.     Appearance: She is well-developed. She is obese.   HENT:      Head: Normocephalic and atraumatic.   Eyes:      Conjunctiva/sclera: Conjunctivae normal.      Pupils: Pupils are equal, round, and reactive to light.   Neck:      Musculoskeletal: Normal range of motion.      Thyroid: No thyromegaly.   Cardiovascular:      Rate and Rhythm: Normal rate and regular rhythm.      Heart sounds: Normal heart sounds.   Pulmonary:      Effort: Pulmonary effort is normal. No respiratory distress.      Breath sounds: Normal breath sounds.   Abdominal:      General: There is no distension.      Palpations: Abdomen is soft. There is no mass.      Tenderness: There is no abdominal tenderness.      Hernia: No hernia is present.   Musculoskeletal: Normal range of motion.         General: No tenderness or deformity.   Lymphadenopathy:      Cervical: No cervical adenopathy.   Skin:     General: Skin is warm and dry.      Coloration: Skin is not pale.      Findings: No rash.   Neurological:      General: No focal deficit present.      Mental Status: She is alert and oriented to person, place, and time.      Motor: No abnormal muscle tone.      Coordination: Coordination normal.   Psychiatric:         Mood and Affect: Mood normal.         Behavior: Behavior normal.         Thought Content: Thought content normal.         Judgment: Judgment normal.           Assessment/Plan    Joan was seen today for brain  tumor.    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  Will get routine lab work and make adjustments as needed.  Also recommend referral to a new neurosurgeon.  Otherwise follow-up in 3 months.    This note includes information entered using a voice recognition dictation system.  Though reviewed, some nonsensible errors may remain.

## 2020-09-23 LAB
ALBUMIN SERPL-MCNC: 4.7 G/DL (ref 3.5–5.2)
ALBUMIN/GLOB SERPL: 1.6 G/DL
ALP SERPL-CCNC: 91 U/L (ref 39–117)
ALT SERPL-CCNC: 10 U/L (ref 1–33)
AST SERPL-CCNC: 14 U/L (ref 1–32)
BASOPHILS # BLD AUTO: 0.04 10*3/MM3 (ref 0–0.2)
BASOPHILS NFR BLD AUTO: 0.3 % (ref 0–1.5)
BILIRUB SERPL-MCNC: 0.4 MG/DL (ref 0–1.2)
BUN SERPL-MCNC: 20 MG/DL (ref 8–23)
BUN/CREAT SERPL: 24.4 (ref 7–25)
CALCIUM SERPL-MCNC: 9 MG/DL (ref 8.6–10.5)
CHLORIDE SERPL-SCNC: 103 MMOL/L (ref 98–107)
CO2 SERPL-SCNC: 26.6 MMOL/L (ref 22–29)
CREAT SERPL-MCNC: 0.82 MG/DL (ref 0.57–1)
EOSINOPHIL # BLD AUTO: 0.16 10*3/MM3 (ref 0–0.4)
EOSINOPHIL NFR BLD AUTO: 1.3 % (ref 0.3–6.2)
ERYTHROCYTE [DISTWIDTH] IN BLOOD BY AUTOMATED COUNT: 14.5 % (ref 12.3–15.4)
GLOBULIN SER CALC-MCNC: 3 GM/DL
GLUCOSE SERPL-MCNC: 106 MG/DL (ref 65–99)
HCT VFR BLD AUTO: 42.3 % (ref 34–46.6)
HGB BLD-MCNC: 14.1 G/DL (ref 12–15.9)
IMM GRANULOCYTES # BLD AUTO: 0.09 10*3/MM3 (ref 0–0.05)
IMM GRANULOCYTES NFR BLD AUTO: 0.7 % (ref 0–0.5)
LYMPHOCYTES # BLD AUTO: 1.88 10*3/MM3 (ref 0.7–3.1)
LYMPHOCYTES NFR BLD AUTO: 15.6 % (ref 19.6–45.3)
MCH RBC QN AUTO: 27 PG (ref 26.6–33)
MCHC RBC AUTO-ENTMCNC: 33.3 G/DL (ref 31.5–35.7)
MCV RBC AUTO: 81 FL (ref 79–97)
MONOCYTES # BLD AUTO: 0.82 10*3/MM3 (ref 0.1–0.9)
MONOCYTES NFR BLD AUTO: 6.8 % (ref 5–12)
NEUTROPHILS # BLD AUTO: 9.09 10*3/MM3 (ref 1.7–7)
NEUTROPHILS NFR BLD AUTO: 75.3 % (ref 42.7–76)
NRBC BLD AUTO-RTO: 0 /100 WBC (ref 0–0.2)
PLATELET # BLD AUTO: 222 10*3/MM3 (ref 140–450)
POTASSIUM SERPL-SCNC: 4.3 MMOL/L (ref 3.5–5.2)
PROT SERPL-MCNC: 7.7 G/DL (ref 6–8.5)
RBC # BLD AUTO: 5.22 10*6/MM3 (ref 3.77–5.28)
SODIUM SERPL-SCNC: 143 MMOL/L (ref 136–145)
T4 FREE SERPL-MCNC: 1.37 NG/DL (ref 0.93–1.7)
TSH SERPL DL<=0.005 MIU/L-ACNC: 2.93 UIU/ML (ref 0.27–4.2)
WBC # BLD AUTO: 12.08 10*3/MM3 (ref 3.4–10.8)

## 2021-03-19 ENCOUNTER — BULK ORDERING (OUTPATIENT)
Dept: CASE MANAGEMENT | Facility: OTHER | Age: 66
End: 2021-03-19

## 2021-03-19 DIAGNOSIS — Z23 IMMUNIZATION DUE: ICD-10-CM

## 2021-03-24 ENCOUNTER — IMMUNIZATION (OUTPATIENT)
Dept: VACCINE CLINIC | Facility: HOSPITAL | Age: 66
End: 2021-03-24

## 2021-03-24 DIAGNOSIS — Z23 IMMUNIZATION DUE: ICD-10-CM

## 2021-03-24 PROCEDURE — 0001A: CPT | Performed by: INTERNAL MEDICINE

## 2021-03-24 PROCEDURE — 91300 HC SARSCOV02 VAC 30MCG/0.3ML IM: CPT | Performed by: INTERNAL MEDICINE

## 2021-04-14 ENCOUNTER — IMMUNIZATION (OUTPATIENT)
Dept: VACCINE CLINIC | Facility: HOSPITAL | Age: 66
End: 2021-04-14

## 2021-04-14 PROCEDURE — 0002A: CPT | Performed by: INTERNAL MEDICINE

## 2021-04-14 PROCEDURE — 91300 HC SARSCOV02 VAC 30MCG/0.3ML IM: CPT | Performed by: INTERNAL MEDICINE

## 2021-05-03 NOTE — PROGRESS NOTES
"DAILY PROGRESS NOTE  [facility]    Patient Identification:  Name: Joan Almanza  Age: 64 y.o.  Sex: female  :  1955  MRN: [id number]         Primary Care Physician: Juarez Winters MD      Subjective  No new c/o.  Overall feeling well post op.     Objective:  General Appearance:  Comfortable, well-appearing, not in pain and in no acute distress.    Vital signs: (most recent): Blood pressure 134/75, pulse 70, temperature 98 °F (36.7 °C), temperature source Oral, resp. rate 18, height 157.5 cm (62\"), weight 127 kg (279 lb 12.2 oz), SpO2 97 %.    Lungs:  Normal effort and normal respiratory rate.  Breath sounds clear to auscultation.    Heart: Normal rate.  Regular rhythm.    Extremities: There is no dependent edema.    Neurological: Patient is alert and oriented to person, place and time.    Skin:  Warm and dry.                Vital signs in last 24 hours:  Temp:  [98 °F (36.7 °C)-98.7 °F (37.1 °C)] 98 °F (36.7 °C)  Heart Rate:  [48-70] 70  Resp:  [16-18] 18  BP: (131-159)/(67-75) 134/75    Intake/Output:    Intake/Output Summary (Last 24 hours) at 3/14/2019 1556  Last data filed at 3/14/2019 1355  Gross per 24 hour   Intake 1778 ml   Output 5035 ml   Net -3257 ml         Results from last 7 days   Lab Units 19  0615 19  0538 03/10/19  0539 19  0522 19  0522   WBC 10*3/mm3 22.14* 16.96* 15.39* 16.11* 13.54*   HEMOGLOBIN g/dL 16.7* 15.3 14.3 15.2 14.0   PLATELETS 10*3/mm3 213 232 214 250 226     Results from last 7 days   Lab Units 19  0615 19  0904 19  0538 03/10/19  0539 19  0522 19  0522   SODIUM mmol/L 137 139 137 140 140 141   POTASSIUM mmol/L 4.4 4.2 4.3 4.5 4.2 3.4*   CHLORIDE mmol/L 96* 102 102 107 109* 106   CO2 mmol/L 28.9 23.5 21.4* 22.5 22.9 23.8   BUN mg/dL 24* 21 18 19 17 21   CREATININE mg/dL 0.92 0.86 0.71 0.72 0.61 0.66   GLUCOSE mg/dL 98 134* 127* 126* 110* 127*   Estimated Creatinine Clearance: 78.9 mL/min (by C-G " formula based on SCr of 0.92 mg/dL).  Results from last 7 days   Lab Units 03/14/19  1456 03/14/19  0615 03/13/19  1715 03/13/19  1404 03/13/19  0417 03/12/19  0904 03/11/19  0538   CALCIUM mg/dL 10.8* 11.5*  11.6* 11.3* 11.2* 11.9* 12.2* 11.6*   ALBUMIN g/dL  --   --   --   --   --   --  3.50   PHOSPHORUS mg/dL  --  2.4*  --   --   --   --  1.8*     Results from last 7 days   Lab Units 03/11/19  0538   ALBUMIN g/dL 3.50       Assessment:  Diplopia - 6th nerve palsy.   Vasogenic edema lt temporal lobe.   Dural AV fistula   Meningioma  Bradycardia  Hyperparathyroidism:  s/p Parathyroidectomy 03/13/19 . Monitor Ca... Very closely.  Vit D, Ca supplement adjustments.   Morbid obesity:       Plan:  Disposition pending stabilization of calcium levels, neurosurgical decisions on timing of further procedures....    Edward Peralta MD  3/14/2019  3:56 PM     7/9/2019- Primary c/s for Cat II- term- 6#6oz   2017 SAB x 1     GYN hx: denies     AP- uncomplicated per pt

## 2021-09-15 DIAGNOSIS — N63.0 BREAST LUMP IN FEMALE: Primary | ICD-10-CM

## 2021-09-15 DIAGNOSIS — N63.0 MULTIPLE BENIGN LUMPS OF BREAST: ICD-10-CM

## 2021-11-30 ENCOUNTER — IMMUNIZATION (OUTPATIENT)
Dept: VACCINE CLINIC | Facility: HOSPITAL | Age: 66
End: 2021-11-30

## 2021-11-30 PROCEDURE — 0004A HC ADM SARSCOV2 30MCG/0.3ML BOOSTER: CPT | Performed by: INTERNAL MEDICINE

## 2021-11-30 PROCEDURE — 91300 HC SARSCOV02 VAC 30MCG/0.3ML IM: CPT | Performed by: INTERNAL MEDICINE

## 2022-05-02 ENCOUNTER — OFFICE VISIT (OUTPATIENT)
Dept: FAMILY MEDICINE CLINIC | Facility: CLINIC | Age: 67
End: 2022-05-02

## 2022-05-02 VITALS
OXYGEN SATURATION: 96 % | HEIGHT: 60 IN | SYSTOLIC BLOOD PRESSURE: 120 MMHG | RESPIRATION RATE: 20 BRPM | HEART RATE: 108 BPM | BODY MASS INDEX: 57.52 KG/M2 | DIASTOLIC BLOOD PRESSURE: 76 MMHG | TEMPERATURE: 96.6 F | WEIGHT: 293 LBS

## 2022-05-02 DIAGNOSIS — K21.9 GERD WITHOUT ESOPHAGITIS: ICD-10-CM

## 2022-05-02 DIAGNOSIS — E66.01 CLASS 3 SEVERE OBESITY DUE TO EXCESS CALORIES WITHOUT SERIOUS COMORBIDITY WITH BODY MASS INDEX (BMI) OF 50.0 TO 59.9 IN ADULT: ICD-10-CM

## 2022-05-02 DIAGNOSIS — D32.9 MENINGIOMA: ICD-10-CM

## 2022-05-02 DIAGNOSIS — I15.8 OTHER SECONDARY HYPERTENSION: ICD-10-CM

## 2022-05-02 DIAGNOSIS — I48.91 ATRIAL FIBRILLATION, UNSPECIFIED TYPE: ICD-10-CM

## 2022-05-02 DIAGNOSIS — Z79.899 HIGH RISK MEDICATION USE: ICD-10-CM

## 2022-05-02 DIAGNOSIS — E83.51 HYPOCALCEMIA: ICD-10-CM

## 2022-05-02 DIAGNOSIS — E21.3 HYPERPARATHYROIDISM: ICD-10-CM

## 2022-05-02 DIAGNOSIS — Z11.59 ENCOUNTER FOR HEPATITIS C SCREENING TEST FOR LOW RISK PATIENT: ICD-10-CM

## 2022-05-02 DIAGNOSIS — Z00.00 MEDICARE ANNUAL WELLNESS VISIT, INITIAL: Primary | ICD-10-CM

## 2022-05-02 PROCEDURE — 1159F MED LIST DOCD IN RCRD: CPT | Performed by: FAMILY MEDICINE

## 2022-05-02 PROCEDURE — G0438 PPPS, INITIAL VISIT: HCPCS | Performed by: FAMILY MEDICINE

## 2022-05-02 PROCEDURE — 1170F FXNL STATUS ASSESSED: CPT | Performed by: FAMILY MEDICINE

## 2022-05-02 RX ORDER — VITAMIN E 268 MG
400 CAPSULE ORAL DAILY
COMMUNITY

## 2022-05-02 NOTE — PROGRESS NOTES
The ABCs of the Annual Wellness Visit  Initial Medicare Wellness Visit    Chief Complaint   Patient presents with   • Medicare Wellness-subsequent     Subjective   History of Present Illness:  Joan Almanza is a 67 y.o. female who presents for an Initial Medicare Wellness Visit.    The following portions of the patient's history were reviewed and   updated as appropriate: allergies, past family history and past social history.     Compared to one year ago, the patient feels her physical   health is the same.    Compared to one year ago, the patient feels her mental   health is the same.    Recent Hospitalizations:  She was not admitted to the hospital during the last year.       Current Medical Providers:  Patient Care Team:  Juarez Winters MD as PCP - General (Family Medicine)    Outpatient Medications Prior to Visit   Medication Sig Dispense Refill   • vitamin E 400 UNIT capsule Take 400 Units by mouth Daily. 450 units       No facility-administered medications prior to visit.       No opioid medication identified on active medication list. I have reviewed chart for other potential  high risk medication/s and harmful drug interactions in the elderly.          Aspirin is not on active medication list.  Aspirin use is not indicated based on review of current medical condition/s. Risk of harm outweighs potential benefits.  .    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  ACP discussion was held with the patient during this visit. Patient does not have an advance directive, declines further assistance.    Review of Systems   Musculoskeletal: Positive for arthralgias.        Ambulates with a cane   All other systems reviewed and are negative.       Objective       Vitals:    05/02/22 1252   BP: 120/76   BP Location: Left arm   Pulse: 108   Resp: 20   Temp: 96.6 °F (35.9 °C)   TempSrc: Temporal   SpO2: 96%   Weight: (!) 142 kg (312 lb 6.4 oz)   Height: 152.4 cm (60\")     BMI Readings from Last 1 Encounters:   05/02/22 61.01 kg/m²   BMI is above normal parameters. Recommendations include: exercise counseling and nutrition counseling    Does the patient have evidence of cognitive impairment? No    Physical Exam  Vitals and nursing note reviewed.   Constitutional:       General: She is not in acute distress.     Appearance: She is well-developed. She is obese.   HENT:      Head: Normocephalic and atraumatic.      Nose:      Comments: Patient with mask.  Provider with mask and shield  Eyes:      Conjunctiva/sclera: Conjunctivae normal.      Pupils: Pupils are equal, round, and reactive to light.   Neck:      Thyroid: No thyromegaly.   Cardiovascular:      Rate and Rhythm: Normal rate and regular rhythm.      Heart sounds: Normal heart sounds.   Pulmonary:      Effort: Pulmonary effort is normal. No respiratory distress.      Breath sounds: Normal breath sounds.   Abdominal:      General: There is no distension.      Palpations: Abdomen is soft. There is no mass.      Tenderness: There is no abdominal tenderness.      Hernia: No hernia is present.   Musculoskeletal:         General: No tenderness or deformity. Normal range of motion.      Cervical back: Normal range of motion.   Lymphadenopathy:      Cervical: No cervical adenopathy.   Skin:     General: Skin is warm and dry.      Coloration: Skin is not " pale.      Findings: No rash.   Neurological:      General: No focal deficit present.      Mental Status: She is alert and oriented to person, place, and time.      Motor: No abnormal muscle tone.      Coordination: Coordination normal.   Psychiatric:         Mood and Affect: Mood normal.         Behavior: Behavior normal.         Thought Content: Thought content normal.         Judgment: Judgment normal.               HEALTH RISK ASSESSMENT    Smoking Status:  Social History     Tobacco Use   Smoking Status Never Smoker   Smokeless Tobacco Never Used     Alcohol Consumption:  Social History     Substance and Sexual Activity   Alcohol Use No     Fall Risk Screen:    [facility], and patient is at LOW risk for falls.Assessment completed on:5/2/2022    Depression Screen:   PHQ-2/PHQ-9 Depression Screening 5/2/2022   Retired Total Score -   Little Interest or Pleasure in Doing Things 0-->not at all   Feeling Down, Depressed or Hopeless 0-->not at all   PHQ-9: Brief Depression Severity Measure Score 0       Health Habits and Functional and Cognitive Screening:  Functional & Cognitive Status 5/2/2022   Do you have difficulty preparing food and eating? No   Do you have difficulty bathing yourself, getting dressed or grooming yourself? No   Do you have difficulty using the toilet? No   Do you have difficulty moving around from place to place? No   Do you have trouble with steps or getting out of a bed or a chair? No   Current Diet Well Balanced Diet   Dental Exam Not up to date   Eye Exam Up to date   Exercise (times per week) 0 times per week   Current Exercises Include No Regular Exercise   Do you need help using the phone?  No   Are you deaf or do you have serious difficulty hearing?  No   Do you need help with transportation? No   Do you need help shopping? No   Do you need help preparing meals?  No   Do you need help with housework?  No   Do you need help with laundry? No   Do you need  help taking your medications? No   Do you need help managing money? No   Do you ever drive or ride in a car without wearing a seat belt? No   Have you felt unusual stress, anger or loneliness in the last month? No   Who do you live with? Spouse   If you need help, do you have trouble finding someone available to you? No   Have you been bothered in the last four weeks by sexual problems? No   Do you have difficulty concentrating, remembering or making decisions? No       Age-appropriate Screening Schedule:  Refer to the list below for future screening recommendations based on patient's age, sex and/or medical conditions. Orders for these recommended tests are listed in the plan section. The patient has been provided with a written plan.    Health Maintenance   Topic Date Due   • DXA SCAN  Never done   • TDAP/TD VACCINES (1 - Tdap) Never done   • ZOSTER VACCINE (1 of 2) Never done   • PAP SMEAR  Never done   • INFLUENZA VACCINE  08/01/2022   • MAMMOGRAM  11/08/2023            Assessment/Plan   CMS Preventative Services Quick Reference  Risk Factors Identified During Encounter  Obesity/Overweight   The above risks/problems have been discussed with the patient.  Follow up actions/plans if indicated are seen below in the Assessment/Plan Section.  Pertinent information has been shared with the patient in the After Visit Summary.    Diagnoses and all orders for this visit:    1. Medicare annual wellness visit, initial (Primary)    2. Atrial fibrillation, unspecified type (HCC)  -     TSH+Free T4    3. Other secondary hypertension    4. Hyperparathyroidism (HCC)  -     Comprehensive Metabolic Panel    5. Hypocalcemia    6. Meningioma (HCC)    7. Class 3 severe obesity due to excess calories without serious comorbidity with body mass index (BMI) of 50.0 to 59.9 in adult (HCC)    8. Encounter for hepatitis C screening test for low risk patient  -     Hepatitis C Antibody    9. High risk medication use  -     CBC &  Differential  -     Comprehensive Metabolic Panel    10. GERD without esophagitis        Follow Up:  Patient here for initial Medicare wellness evaluation as well as follow-up of multiple medical issues.  Overall stable on current regimen.  Main medical problem is morbid obesity.  Will await routine lab and again recommend finding new primary care physician for follow-up in 6 months.    An After Visit Summary and PPPS were made available to the patient.

## 2022-05-03 LAB
ALBUMIN SERPL-MCNC: 4.2 G/DL (ref 3.8–4.8)
ALBUMIN/GLOB SERPL: 1.2 {RATIO} (ref 1.2–2.2)
ALP SERPL-CCNC: 90 IU/L (ref 44–121)
ALT SERPL-CCNC: 7 IU/L (ref 0–32)
AST SERPL-CCNC: 12 IU/L (ref 0–40)
BASOPHILS # BLD AUTO: 0.1 X10E3/UL (ref 0–0.2)
BASOPHILS NFR BLD AUTO: 0 %
BILIRUB SERPL-MCNC: 0.6 MG/DL (ref 0–1.2)
BUN SERPL-MCNC: 15 MG/DL (ref 8–27)
BUN/CREAT SERPL: 14 (ref 12–28)
CALCIUM SERPL-MCNC: 9 MG/DL (ref 8.7–10.3)
CHLORIDE SERPL-SCNC: 104 MMOL/L (ref 96–106)
CO2 SERPL-SCNC: 23 MMOL/L (ref 20–29)
CREAT SERPL-MCNC: 1.11 MG/DL (ref 0.57–1)
EGFRCR SERPLBLD CKD-EPI 2021: 54 ML/MIN/1.73
EOSINOPHIL # BLD AUTO: 0.1 X10E3/UL (ref 0–0.4)
EOSINOPHIL NFR BLD AUTO: 1 %
ERYTHROCYTE [DISTWIDTH] IN BLOOD BY AUTOMATED COUNT: 14.5 % (ref 11.7–15.4)
GLOBULIN SER CALC-MCNC: 3.5 G/DL (ref 1.5–4.5)
GLUCOSE SERPL-MCNC: 107 MG/DL (ref 65–99)
HCT VFR BLD AUTO: 44.5 % (ref 34–46.6)
HCV AB S/CO SERPL IA: <0.1 S/CO RATIO (ref 0–0.9)
HGB BLD-MCNC: 14.6 G/DL (ref 11.1–15.9)
IMM GRANULOCYTES # BLD AUTO: 0.1 X10E3/UL (ref 0–0.1)
IMM GRANULOCYTES NFR BLD AUTO: 1 %
LYMPHOCYTES # BLD AUTO: 1.6 X10E3/UL (ref 0.7–3.1)
LYMPHOCYTES NFR BLD AUTO: 13 %
MCH RBC QN AUTO: 26.9 PG (ref 26.6–33)
MCHC RBC AUTO-ENTMCNC: 32.8 G/DL (ref 31.5–35.7)
MCV RBC AUTO: 82 FL (ref 79–97)
MONOCYTES # BLD AUTO: 0.8 X10E3/UL (ref 0.1–0.9)
MONOCYTES NFR BLD AUTO: 6 %
NEUTROPHILS # BLD AUTO: 9.6 X10E3/UL (ref 1.4–7)
NEUTROPHILS NFR BLD AUTO: 79 %
PLATELET # BLD AUTO: 240 X10E3/UL (ref 150–450)
POTASSIUM SERPL-SCNC: 4.5 MMOL/L (ref 3.5–5.2)
PROT SERPL-MCNC: 7.7 G/DL (ref 6–8.5)
RBC # BLD AUTO: 5.42 X10E6/UL (ref 3.77–5.28)
SODIUM SERPL-SCNC: 147 MMOL/L (ref 134–144)
T4 FREE SERPL-MCNC: 1.28 NG/DL (ref 0.82–1.77)
TSH SERPL DL<=0.005 MIU/L-ACNC: 2.19 UIU/ML (ref 0.45–4.5)
WBC # BLD AUTO: 12.3 X10E3/UL (ref 3.4–10.8)

## 2022-05-25 DIAGNOSIS — Z12.12 SCREENING FOR COLORECTAL CANCER: Primary | ICD-10-CM

## 2022-05-25 DIAGNOSIS — Z12.11 SCREENING FOR COLORECTAL CANCER: Primary | ICD-10-CM

## 2022-12-06 ENCOUNTER — OFFICE VISIT (OUTPATIENT)
Dept: FAMILY MEDICINE CLINIC | Facility: CLINIC | Age: 67
End: 2022-12-06

## 2022-12-06 VITALS
WEIGHT: 293 LBS | BODY MASS INDEX: 57.52 KG/M2 | OXYGEN SATURATION: 97 % | DIASTOLIC BLOOD PRESSURE: 60 MMHG | HEART RATE: 95 BPM | HEIGHT: 60 IN | SYSTOLIC BLOOD PRESSURE: 120 MMHG | TEMPERATURE: 96.4 F

## 2022-12-06 DIAGNOSIS — Z86.39 HISTORY OF ELEVATED GLUCOSE: ICD-10-CM

## 2022-12-06 DIAGNOSIS — E66.01 CLASS 3 SEVERE OBESITY DUE TO EXCESS CALORIES WITHOUT SERIOUS COMORBIDITY WITH BODY MASS INDEX (BMI) OF 50.0 TO 59.9 IN ADULT: ICD-10-CM

## 2022-12-06 DIAGNOSIS — I15.8 OTHER SECONDARY HYPERTENSION: Primary | ICD-10-CM

## 2022-12-06 PROCEDURE — 99213 OFFICE O/P EST LOW 20 MIN: CPT | Performed by: NURSE PRACTITIONER

## 2022-12-06 RX ORDER — IBUPROFEN 200 MG
200 TABLET ORAL EVERY 6 HOURS PRN
COMMUNITY

## 2022-12-06 RX ORDER — MULTIPLE VITAMINS W/ MINERALS TAB 9MG-400MCG
1 TAB ORAL DAILY
COMMUNITY

## 2022-12-07 LAB
ALBUMIN SERPL-MCNC: 4.4 G/DL (ref 3.5–5.2)
ALBUMIN/GLOB SERPL: 1.4 G/DL
ALP SERPL-CCNC: 85 U/L (ref 39–117)
ALT SERPL-CCNC: 10 U/L (ref 1–33)
AST SERPL-CCNC: 14 U/L (ref 1–32)
BASOPHILS # BLD AUTO: 0.04 10*3/MM3 (ref 0–0.2)
BASOPHILS NFR BLD AUTO: 0.3 % (ref 0–1.5)
BILIRUB SERPL-MCNC: 0.4 MG/DL (ref 0–1.2)
BUN SERPL-MCNC: 17 MG/DL (ref 8–23)
BUN/CREAT SERPL: 19.3 (ref 7–25)
CALCIUM SERPL-MCNC: 9.1 MG/DL (ref 8.6–10.5)
CHLORIDE SERPL-SCNC: 103 MMOL/L (ref 98–107)
CHOLEST SERPL-MCNC: 203 MG/DL (ref 0–200)
CO2 SERPL-SCNC: 30.6 MMOL/L (ref 22–29)
CREAT SERPL-MCNC: 0.88 MG/DL (ref 0.57–1)
EGFRCR SERPLBLD CKD-EPI 2021: 72.1 ML/MIN/1.73
EOSINOPHIL # BLD AUTO: 0.16 10*3/MM3 (ref 0–0.4)
EOSINOPHIL NFR BLD AUTO: 1.3 % (ref 0.3–6.2)
ERYTHROCYTE [DISTWIDTH] IN BLOOD BY AUTOMATED COUNT: 13.7 % (ref 12.3–15.4)
GLOBULIN SER CALC-MCNC: 3.1 GM/DL
GLUCOSE SERPL-MCNC: 109 MG/DL (ref 65–99)
HCT VFR BLD AUTO: 42.7 % (ref 34–46.6)
HDLC SERPL-MCNC: 39 MG/DL (ref 40–60)
HGB BLD-MCNC: 14.2 G/DL (ref 12–15.9)
IMM GRANULOCYTES # BLD AUTO: 0.06 10*3/MM3 (ref 0–0.05)
IMM GRANULOCYTES NFR BLD AUTO: 0.5 % (ref 0–0.5)
LDLC SERPL CALC-MCNC: 129 MG/DL (ref 0–100)
LYMPHOCYTES # BLD AUTO: 1.68 10*3/MM3 (ref 0.7–3.1)
LYMPHOCYTES NFR BLD AUTO: 13.9 % (ref 19.6–45.3)
MCH RBC QN AUTO: 28.4 PG (ref 26.6–33)
MCHC RBC AUTO-ENTMCNC: 33.3 G/DL (ref 31.5–35.7)
MCV RBC AUTO: 85.4 FL (ref 79–97)
MONOCYTES # BLD AUTO: 0.83 10*3/MM3 (ref 0.1–0.9)
MONOCYTES NFR BLD AUTO: 6.9 % (ref 5–12)
NEUTROPHILS # BLD AUTO: 9.34 10*3/MM3 (ref 1.7–7)
NEUTROPHILS NFR BLD AUTO: 77.1 % (ref 42.7–76)
NRBC BLD AUTO-RTO: 0 /100 WBC (ref 0–0.2)
PLATELET # BLD AUTO: 243 10*3/MM3 (ref 140–450)
POTASSIUM SERPL-SCNC: 4.5 MMOL/L (ref 3.5–5.2)
PROT SERPL-MCNC: 7.5 G/DL (ref 6–8.5)
RBC # BLD AUTO: 5 10*6/MM3 (ref 3.77–5.28)
SODIUM SERPL-SCNC: 144 MMOL/L (ref 136–145)
TRIGL SERPL-MCNC: 196 MG/DL (ref 0–150)
VLDLC SERPL CALC-MCNC: 35 MG/DL (ref 5–40)
WBC # BLD AUTO: 12.11 10*3/MM3 (ref 3.4–10.8)

## 2022-12-07 NOTE — PROGRESS NOTES
"Chief Complaint  Establish Care, Hypertension, and Knee Pain    Subjective          Joan Almanza presents to [facility]  History of Present Illness  Patient is a 67 year old female here to establish care and to follow-up on chronic health issues:    HTN - controlled; not currently taking medications for BP.    Knee pain - currently using Voltaren cream on knees and doing fine, no complaints today.          Review of Systems   Respiratory: Negative for shortness of breath.    Cardiovascular: Negative for chest pain, palpitations and leg swelling.   Musculoskeletal: Positive for gait problem. Negative for joint swelling.        Uses cane; denies knee pain today.         Objective   Vital Signs:   /60 (BP Location: Left arm, Patient Position: Sitting)   Pulse 95   Temp 96.4 °F (35.8 °C) (Temporal)   Ht 152.4 cm (60\")   Wt 134 kg (295 lb 9.6 oz)   SpO2 97%   BMI 57.73 kg/m²     Physical Exam  Constitutional:       General: She is not in acute distress.     Appearance: Normal appearance.   HENT:      Head: Normocephalic and atraumatic.   Cardiovascular:      Rate and Rhythm: Normal rate and regular rhythm.      Heart sounds: Normal heart sounds.   Pulmonary:      Effort: Pulmonary effort is normal. No respiratory distress.      Breath sounds: Normal breath sounds. No wheezing or rhonchi.   Musculoskeletal:      Right lower leg: No edema.      Left lower leg: No edema.   Skin:     General: Skin is warm and dry.   Neurological:      Mental Status: She is alert.        Result Review :     CMP    CMP 5/2/22   Glucose 107 (A)   BUN 15   Creatinine 1.11 (A)   Sodium 147 (A)   Potassium 4.5   Chloride 104   Calcium 9.0   Total Protein 7.7   Albumin 4.2   Globulin 3.5   Total Bilirubin 0.6   Alkaline Phosphatase 90   AST (SGOT) 12   ALT (SGPT) 7   (A) Abnormal value            CBC w/diff    CBC w/Diff 5/2/22   WBC 12.3 (A)   RBC 5.42 (A)   Hemoglobin 14.6   Hematocrit 44.5   MCV 82 "   MCH 26.9   MCHC 32.8   RDW 14.5   Platelets 240   Neutrophil Rel % 79   Lymphocyte Rel % 13   Monocyte Rel % 6   Eosinophil Rel % 1   Basophil Rel % 0   (A) Abnormal value                      Assessment and Plan    Diagnoses and all orders for this visit:    1. Other secondary hypertension (Primary)  Comments:  Controlled.  Denies taking blood pressure medication.    2. Class 3 severe obesity due to excess calories without serious comorbidity with body mass index (BMI) of 50.0 to 59.9 in adult (HCC)  Comments:  BMI 57.73; Discussed increasing activity 20-30 min per day/4-5 days per week.  Discussed reducing fried/fast/fatty foods and decreasing foods/drinks w/sugar.  Orders:  -     CBC & Differential  -     Comprehensive Metabolic Panel  -     Lipid Panel    Patient is established with [facility] but new to me.      Follow Up   Return in about 6 months (around 6/6/2023) for Next scheduled follow up.  Patient was given instructions and counseling regarding her condition or for health maintenance advice. Please see specific information pulled into the AVS if appropriate.

## 2022-12-09 ENCOUNTER — PATIENT ROUNDING (BHMG ONLY) (OUTPATIENT)
Dept: FAMILY MEDICINE CLINIC | Facility: CLINIC | Age: 67
End: 2022-12-09

## 2022-12-09 NOTE — PROGRESS NOTES
A [employer] message has been sent to patient rounding with [facility].

## 2023-03-17 PROCEDURE — 0051A COVID-19 (PFIZER) 12+ YRS: CPT | Performed by: NURSE PRACTITIONER

## 2023-03-17 PROCEDURE — 91305 COVID-19 (PFIZER) 12+ YRS: CPT | Performed by: NURSE PRACTITIONER

## 2023-05-08 ENCOUNTER — OFFICE VISIT (OUTPATIENT)
Dept: FAMILY MEDICINE CLINIC | Facility: CLINIC | Age: 68
End: 2023-05-08
Payer: MEDICARE

## 2023-05-08 VITALS
HEART RATE: 77 BPM | WEIGHT: 280 LBS | DIASTOLIC BLOOD PRESSURE: 84 MMHG | OXYGEN SATURATION: 99 % | BODY MASS INDEX: 54.97 KG/M2 | HEIGHT: 60 IN | RESPIRATION RATE: 18 BRPM | SYSTOLIC BLOOD PRESSURE: 122 MMHG

## 2023-05-08 DIAGNOSIS — I49.9 IRREGULAR HEART RHYTHM: ICD-10-CM

## 2023-05-08 DIAGNOSIS — I48.91 ATRIAL FIBRILLATION, UNSPECIFIED TYPE: ICD-10-CM

## 2023-05-08 DIAGNOSIS — Z00.00 ENCOUNTER FOR SUBSEQUENT ANNUAL WELLNESS VISIT (AWV) IN MEDICARE PATIENT: Primary | ICD-10-CM

## 2023-05-08 PROBLEM — Z53.20 STATIN MEDICATION DECLINED BY PATIENT: Status: ACTIVE | Noted: 2023-05-08

## 2023-05-08 RX ORDER — METOPROLOL SUCCINATE 25 MG/1
25 TABLET, EXTENDED RELEASE ORAL DAILY
Qty: 90 TABLET | Refills: 0 | Status: SHIPPED | OUTPATIENT
Start: 2023-05-08

## 2023-05-08 NOTE — PROGRESS NOTES
The ABCs of the Annual Wellness Visit  Subsequent Medicare Wellness Visit    Chief Complaint   Patient presents with   • Medicare Wellness-subsequent       Subjective      Joan Almanza is a 68 y.o. female who presents for a Subsequent Medicare Wellness Visit.  She denies any complaints today.    The following portions of the patient's history were reviewed and   updated as appropriate: allergies, current medications, past family history, past medical history, past social history, past surgical history and problem list.    Compared to one year ago, the patient feels her physical   health is the same.    Compared to one year ago, the patient feels her mental   health is the same.    Recent Hospitalizations:  She was not admitted to the hospital during the last year.     Current Medical Providers:  Patient Care Team:  Joan Roberts APRN as PCP - General (Nurse Practitioner)    Outpatient Medications Prior to Visit   Medication Sig Dispense Refill   • multivitamin with minerals tablet tablet Take 1 tablet by mouth Daily.     • vitamin E 400 UNIT capsule Take 1 capsule by mouth Daily. 450 units     • ibuprofen (ADVIL,MOTRIN) 200 MG tablet Take 1 tablet by mouth Every 6 (Six) Hours As Needed for Mild Pain.       No facility-administered medications prior to visit.     Review of Systems   Respiratory: Negative for shortness of breath.    Cardiovascular: Negative for chest pain and palpitations.   Neurological: Negative for dizziness, syncope and light-headedness.       No opioid medication identified on active medication list. I have reviewed chart for other potential  high risk medication/s and harmful drug interactions in the elderly.          Aspirin is not on active medication list.  Aspirin use is not indicated based on review of current medical condition/s. Risk of harm outweighs potential benefits.      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  ACP discussion was held with the patient during this visit. Advance Directive packet was given to patient during last visit and she is in the process of completing it.  Request she bring completed copy in for our chart and she agreed.     Objective    Vitals:    05/08/23 1140   BP: 122/84   BP Location: Right arm   Patient Position: Sitting   Cuff Size: Large Adult   Pulse: 77   Resp: 18   SpO2: 99%   Weight: 127 kg (280 lb)   Height: 152.4 cm (60\")   PainSc: 0-No pain     Estimated body mass index is 54.68 kg/m² as calculated from the following:    Height as of this encounter: 152.4 cm (60\").    Weight as of this encounter: 127 kg (280 lb).    Class 3 Severe Obesity (BMI >=40). Obesity-related health conditions include the following: Lymph Edema BLE diagnosed 15-20 years ago. Obesity is improving with lifestyle modifications. BMI is is above average; BMI management plan is completed. We discussed low calorie, low carb based diet program, portion control and increasing exercise.    Does the patient have evidence of cognitive impairment?   No    Lab Results   Component Value Date    CHLPL 217 (H) 03/17/2023    TRIG 141 03/17/2023    HDL 47 03/17/2023     (H) 03/17/2023    VLDL 25 03/17/2023          HEALTH RISK ASSESSMENT    Smoking Status:  Social History     Tobacco Use   Smoking Status Never   • Passive " exposure: Never   Smokeless Tobacco Never     Alcohol Consumption:  Social History     Substance and Sexual Activity   Alcohol Use No     Fall Risk Screen:    ALEKSANDRADI Fall Risk Assessment was completed, and patient is at LOW risk for falls.Assessment completed on:2023    Depression Screenin/8/2023    11:37 AM   PHQ-2/PHQ-9 Depression Screening   Little Interest or Pleasure in Doing Things 0-->not at all   Feeling Down, Depressed or Hopeless 0-->not at all   PHQ-9: Brief Depression Severity Measure Score 0       Health Habits and Functional and Cognitive Screenin/8/2023    11:38 AM   Functional & Cognitive Status   Do you have difficulty preparing food and eating? No   Do you have difficulty bathing yourself, getting dressed or grooming yourself? No   Do you have difficulty using the toilet? No   Do you have difficulty moving around from place to place? No   Do you have trouble with steps or getting out of a bed or a chair? Yes   Current Diet Well Balanced Diet   Dental Exam Not up to date   Eye Exam Not up to date   Exercise (times per week) 3 times per week   Current Exercises Include Walking;Gardening   Do you need help using the phone?  No   Are you deaf or do you have serious difficulty hearing?  No   Do you need help with transportation? No   Do you need help shopping? No   Do you need help preparing meals?  No   Do you need help with housework?  No   Do you need help with laundry? No   Do you need help taking your medications? No   Do you need help managing money? No   Do you ever drive or ride in a car without wearing a seat belt? No       Age-appropriate Screening Schedule:  Refer to the list below for future screening recommendations based on patient's age, sex and/or medical conditions. Orders for these recommended tests are listed in the plan section. The patient has been provided with a written plan.    Health Maintenance   Topic Date Due   • DXA SCAN  Never done   • Pneumococcal  Vaccine 65+ (1 - PCV) Never done   • TDAP/TD VACCINES (1 - Tdap) Never done   • ZOSTER VACCINE (1 of 2) Never done   • PAP SMEAR  Never done   • COLORECTAL CANCER SCREENING  06/10/2022   • COVID-19 Vaccine (5 - Booster for Pfizer series) 05/12/2023   • INFLUENZA VACCINE  08/01/2023   • MAMMOGRAM  11/08/2023   • ANNUAL WELLNESS VISIT  05/08/2024   • HEPATITIS C SCREENING  Completed                Physical Exam  Vitals and nursing note reviewed.   Constitutional:       General: She is not in acute distress.     Appearance: Normal appearance. She is not ill-appearing.   HENT:      Head: Normocephalic and atraumatic.   Eyes:      Conjunctiva/sclera: Conjunctivae normal.      Pupils: Pupils are equal, round, and reactive to light.   Cardiovascular:      Rate and Rhythm: Normal rate. Rhythm irregularly irregular.      Heart sounds: No murmur heard.  Pulmonary:      Effort: Pulmonary effort is normal. No respiratory distress.      Breath sounds: Normal breath sounds. No wheezing.   Skin:     General: Skin is warm and dry.      Findings: No erythema.   Neurological:      General: No focal deficit present.      Mental Status: She is alert.   Psychiatric:         Mood and Affect: Mood normal.           ECG 12 Lead    Date/Time: 5/8/2023 11:23 AM  Performed by: Joan Roberts APRN  Authorized by: Joan Roberts APRN   Comparison: compared with previous ECG from 4/22/2019  Comparison to previous ECG: No significant change from previous EKG  Rhythm: atrial fibrillation  Rate: normal  BPM: 95  Conduction: right bundle branch block    Clinical impression: abnormal EKG  Comments: Signed by Dr. Owens.          CMS Preventative Services Quick Reference  Risk Factors Identified During Encounter:    Immunizations Discussed/Encouraged: Tdap, Prevnar 20 (Pneumococcal 20-valent conjugate) and Shingrix.  Patient declined vaccinations today.  Dental Screening Recommended - She is anodontia.  She does not have dentures.  She said  she will not wear dentures.  Vision Screening Recommended - it has been about 2 years since her last eye exam.  She agrees to schedule appointment to see ophthalmology next month.    The above risks/problems have been discussed with the patient.  Pertinent information has been shared with the patient in the After Visit Summary.    Assessment/Plan:     Diagnoses and all orders for this visit:    1. Encounter for subsequent annual wellness visit (AWV) in Medicare patient (Primary)    2. Irregular heart rhythm  -     ECG 12 Lead    3. Atrial fibrillation, unspecified type  Assessment & Plan:  Irregular heart rhythm on assessment.  Order:  EKG, result = Atrial Fibrillation  RX: Metoprolol Succ XL 25mg daily.  RX (sample): Eliquis 5mg BID.  Discussed importance of increased bleeding when taking Eliquis.  Advised no NSAID use.  Take stool softeners for constipation to minimize risk of anal bleeding with straining.  Discussed importance of monitoring for abnormal bleeding (stool, urine, nose, cuts, falls).  Referral to Cardiology for evaluation and treatment and patient agreed.    Orders:  -     metoprolol succinate XL (Toprol XL) 25 MG 24 hr tablet; Take 1 tablet by mouth Daily.  Dispense: 90 tablet; Refill: 0  -     apixaban (ELIQUIS) 5 MG tablet tablet; Take 1 tablet by mouth Every 12 (Twelve) Hours.  Dispense: 180 tablet; Refill: 0  -     Ambulatory Referral to Cardiology    Other orders  -     Cancel: ECG 12 Lead      In 2018 she had parathyroid surgery and went into A-fib.  She was seen by Dr. Baeza.  She was put on Metorpolol by Dr. Winters.  She reports was never put on blood thinner when told had Atrial Fibrillation in the past.    Follow Up:   Next Medicare Wellness visit to be scheduled in 1 year.      An After Visit Summary and PPPS were made available to the patient.

## 2023-05-08 NOTE — PROGRESS NOTES
"The ABCs of the Annual Wellness Visit  Subsequent Medicare Wellness Visit    Subjective    Joan Almanza is a 68 y.o. female who presents for a Subsequent Medicare Wellness Visit.    The following portions of the patient's history were reviewed and   updated as appropriate: {history reviewed:20406::\"allergies\",\"current medications\",\"past family history\",\"past medical history\",\"past social history\",\"past surgical history\",\"problem list\"}.    Compared to one year ago, the patient feels her physical   health is {better worse same:07451}.    Compared to one year ago, the patient feels her mental   health is {better worse same:29427}.    Recent Hospitalizations:  {Hospital Admission Status in the last 365 days:82483}      Current Medical Providers:  Patient Care Team:  Joan Roberts APRN as PCP - General (Nurse Practitioner)    Outpatient Medications Prior to Visit   Medication Sig Dispense Refill   • multivitamin with minerals tablet tablet Take 1 tablet by mouth Daily.     • vitamin E 400 UNIT capsule Take 1 capsule by mouth Daily. 450 units     • ibuprofen (ADVIL,MOTRIN) 200 MG tablet Take 1 tablet by mouth Every 6 (Six) Hours As Needed for Mild Pain.       No facility-administered medications prior to visit.       No opioid medication identified on active medication list. I have reviewed chart for other potential  high risk medication/s and harmful drug interactions in the elderly.          Aspirin is not on active medication list.  {ASPIRIN NOT ON MEDICATION LIST INDICATED/NOT INDICATED:37163}.    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  {ACP Discussion, Advance Directive not in EMR:70088}     Objective    Vitals:    23 1140   BP: 122/84   BP Location: Right arm   Patient Position: Sitting   Cuff Size: Large Adult   Pulse: 77   Resp: 18   SpO2: 99%   Weight: 127 kg (280 lb)   Height: 152.4 cm (60\")   PainSc: 0-No pain     Estimated body mass index is 54.68 kg/m² as calculated from the following:    Height as of this encounter: 152.4 cm (60\").    Weight as of this encounter: 127 kg (280 lb).    {Class 3 Severe Obesity (BMI >=40).:6610049664}      Does the patient have evidence of cognitive impairment? {Yes/No:13875}    Lab Results   Component Value Date    CHLPL 217 (H) 2023    TRIG 141 2023    HDL 47 2023     (H) 2023    VLDL 25 2023        HEALTH RISK ASSESSMENT    Smoking Status:  Social History     Tobacco Use   Smoking Status Never   • Passive exposure: Never   Smokeless Tobacco Never     Alcohol Consumption:  Social History     Substance and Sexual Activity   Alcohol Use No     Fall Risk Screen:    STEADI Fall Risk Assessment was completed, and patient is at LOW risk for falls.Assessment completed on:2023    Depression Screenin/8/2023    11:37 AM   PHQ-2/PHQ-9 Depression Screening   Little Interest or Pleasure in Doing Things 0-->not at all   Feeling Down, Depressed or Hopeless 0-->not at all   PHQ-9: Brief Depression Severity Measure Score 0       Health Habits and Functional and Cognitive Screenin/8/2023    11:38 AM   Functional & Cognitive Status   Do you have difficulty preparing food and eating? No   Do you have difficulty bathing yourself, getting dressed or grooming yourself? No "   Do you have difficulty using the toilet? No   Do you have difficulty moving around from place to place? No   Do you have trouble with steps or getting out of a bed or a chair? Yes   Current Diet Well Balanced Diet   Dental Exam Not up to date   Eye Exam Not up to date   Exercise (times per week) 3 times per week   Current Exercises Include Walking;Gardening   Do you need help using the phone?  No   Are you deaf or do you have serious difficulty hearing?  No   Do you need help with transportation? No   Do you need help shopping? No   Do you need help preparing meals?  No   Do you need help with housework?  No   Do you need help with laundry? No   Do you need help taking your medications? No   Do you need help managing money? No   Do you ever drive or ride in a car without wearing a seat belt? No       Age-appropriate Screening Schedule:  Refer to the list below for future screening recommendations based on patient's age, sex and/or medical conditions. Orders for these recommended tests are listed in the plan section. The patient has been provided with a written plan.    Health Maintenance   Topic Date Due   • DXA SCAN  Never done   • Pneumococcal Vaccine 65+ (1 - PCV) Never done   • TDAP/TD VACCINES (1 - Tdap) Never done   • ZOSTER VACCINE (1 of 2) Never done   • PAP SMEAR  Never done   • COLORECTAL CANCER SCREENING  06/10/2022   • ANNUAL WELLNESS VISIT  05/02/2023   • COVID-19 Vaccine (5 - Booster for Pfizer series) 05/12/2023   • INFLUENZA VACCINE  08/01/2023   • MAMMOGRAM  11/08/2023   • HEPATITIS C SCREENING  Completed                  CMS Preventative Services Quick Reference  Risk Factors Identified During Encounter  {Medicare Wellness Risk Factors:30161}  The above risks/problems have been discussed with the patient.  Pertinent information has been shared with the patient in the After Visit Summary.  An After Visit Summary and PPPS were made available to the patient.    Follow Up:   Next Medicare Wellness  "visit to be scheduled in 1 year.   {Wrapup  Review (Popup)  Advance Care Planning  Labs  CC  Problem List  Visit Diagnosis  Medications  Result Review  Imaging  Health Maintenance  Quality  BestPractice  SmartSets  SnapShot  Encounters  Notes  Media  Procedures :23}    Additional E&M Note during same encounter follows:  Patient has multiple medical problems which are significant and separately identifiable that require additional work above and beyond the Medicare Wellness Visit.      Chief Complaint  Medicare Wellness-subsequent    Subjective    {Problem List  Visit Diagnosis   Encounters  Notes  Medications  Labs  Result Review Imaging  Media :23}    HPI  Joan Almanza is also being seen today for ***         Objective   Vital Signs:  /84 (BP Location: Right arm, Patient Position: Sitting, Cuff Size: Large Adult)   Pulse 77   Resp 18   Ht 152.4 cm (60\")   Wt 127 kg (280 lb)   SpO2 99%   BMI 54.68 kg/m²     Physical Exam     {The following data was reviewed by (Optional):94949}  {Ambulatory Labs (Optional):27218}  {Data reviewed (Optional):46223:::1}           Assessment and Plan {CC Problem List  Visit Diagnosis   ROS  Review (Popup)  Health Maintenance  Quality  BestPractice  Medications  SmartSets  SnapShot Encounters  Media :23}  Diagnoses and all orders for this visit:    1. Encounter for subsequent annual wellness visit (AWV) in Medicare patient (Primary)    2. Irregular heart rhythm    3. Atrial fibrillation, unspecified type  -     metoprolol succinate XL (Toprol XL) 25 MG 24 hr tablet; Take 1 tablet by mouth Daily.  Dispense: 90 tablet; Refill: 0  -     apixaban (ELIQUIS) 5 MG tablet tablet; Take 1 tablet by mouth Every 12 (Twelve) Hours.  Dispense: 180 tablet; Refill: 0  -     Ambulatory Referral to Cardiology           {Time Spent (Optional):52725}  Follow Up {Instructions Charge Capture  Follow-up Communications :23}  Return in about 2 months " (around 7/8/2023) for Next scheduled follow up - A-fib, Eliquis.  Patient was given instructions and counseling regarding her condition or for health maintenance advice. Please see specific information pulled into the AVS if appropriate.

## 2023-05-18 NOTE — ASSESSMENT & PLAN NOTE
Irregular heart rhythm on assessment.  Order:  EKG, result = Atrial Fibrillation  RX: Metoprolol Succ XL 25mg daily.  RX (sample): Eliquis 5mg BID.  Discussed importance of increased bleeding when taking Eliquis.  Advised no NSAID use.  Take stool softeners for constipation to minimize risk of anal bleeding with straining.  Discussed importance of monitoring for abnormal bleeding (stool, urine, nose, cuts, falls).  Referral to Cardiology for evaluation and treatment and patient agreed.

## 2023-08-06 DIAGNOSIS — I48.91 ATRIAL FIBRILLATION, UNSPECIFIED TYPE: ICD-10-CM

## 2023-08-08 RX ORDER — METOPROLOL SUCCINATE 25 MG/1
25 TABLET, EXTENDED RELEASE ORAL DAILY
Qty: 90 TABLET | Refills: 0 | Status: SHIPPED | OUTPATIENT
Start: 2023-08-08

## 2023-09-12 ENCOUNTER — OFFICE VISIT (OUTPATIENT)
Dept: CARDIOLOGY | Facility: CLINIC | Age: 68
End: 2023-09-12
Payer: MEDICARE

## 2023-09-12 VITALS
HEART RATE: 84 BPM | DIASTOLIC BLOOD PRESSURE: 80 MMHG | BODY MASS INDEX: 55.99 KG/M2 | WEIGHT: 285.2 LBS | HEIGHT: 60 IN | SYSTOLIC BLOOD PRESSURE: 140 MMHG

## 2023-09-12 DIAGNOSIS — I48.11 LONGSTANDING PERSISTENT ATRIAL FIBRILLATION: Primary | ICD-10-CM

## 2023-09-12 DIAGNOSIS — I67.1 CEREBROVASCULAR DURAL AV FISTULA: ICD-10-CM

## 2023-09-12 DIAGNOSIS — I48.19 ATRIAL FIBRILLATION, PERSISTENT: ICD-10-CM

## 2023-09-12 PROCEDURE — 3077F SYST BP >= 140 MM HG: CPT | Performed by: INTERNAL MEDICINE

## 2023-09-12 PROCEDURE — 1160F RVW MEDS BY RX/DR IN RCRD: CPT | Performed by: INTERNAL MEDICINE

## 2023-09-12 PROCEDURE — 1159F MED LIST DOCD IN RCRD: CPT | Performed by: INTERNAL MEDICINE

## 2023-09-12 PROCEDURE — 99204 OFFICE O/P NEW MOD 45 MIN: CPT | Performed by: INTERNAL MEDICINE

## 2023-09-12 PROCEDURE — 3079F DIAST BP 80-89 MM HG: CPT | Performed by: INTERNAL MEDICINE

## 2023-09-12 PROCEDURE — 93000 ELECTROCARDIOGRAM COMPLETE: CPT | Performed by: INTERNAL MEDICINE

## 2023-09-12 RX ORDER — PREDNISOLONE ACETATE 10 MG/ML
SUSPENSION/ DROPS OPHTHALMIC
COMMUNITY
Start: 2023-07-26

## 2023-09-12 RX ORDER — DICLOFENAC SODIUM 1 MG/ML
SOLUTION/ DROPS OPHTHALMIC
COMMUNITY
Start: 2023-07-26

## 2023-09-12 RX ORDER — MOXIFLOXACIN 5 MG/ML
SOLUTION/ DROPS OPHTHALMIC
COMMUNITY
Start: 2023-07-26

## 2023-09-12 NOTE — PROGRESS NOTES
Date of Office Visit: 2023  Encounter Provider: Mary Baeza MD  Place of Service: [facility] CARDIOLOGY  Patient Name: Joan Almanza  :1955    Chief complaint  Consult requested by MILA Roberts for evaluation of atrial fibrillation.    History of Present Illness  Patient is a 68-year-old female with history of hypertension, lymphedema, morbid obesity hyperparathyroidism, meningioma, cranial AV fistula and atrial fibrillation.  Patient was seen 3/2019 for bradycardia in the setting of hypertension and cerebral edema and diplopia.  She underwent parathyroidectomy in hospital.  Postoperatively she developed atrial fibrillation with rapid rates initially elevated but then controlled during her hospitalization.  Echo at this time showed an ejection fraction of 49% the estimated EF slightly higher with the timing of the focal study.  There is mild left ventricular hypertrophy and no significant valve dysfunction.  She was dismissed home on metoprolol and Eliquis.  However after a period of time she stopped metoprolol on her own as she did not feel it was helping her symptoms.  By May 2023 she was noted to have possible rapid heartbeat and metoprolol was added to her regimen of Eliquis.    Patient states that she has been busy taking care of her  and doing yard work.  As best that she can tell she has remained in atrial fibrillation.  She was to have had further follow-up with a cranial AV fistula and possible resection however due to time constraints this was not pursued.  She has no palpitation chest pain shortness of breath lightheadedness she has edema and lymphedema.  Blood pressure at home is typically 130 over 80s occasional in the 120s.  His STOP-BANG score is 4 though she has had no snoring.  She has no family history of sleep apnea he has mild daytime somnolence    Past Medical History:   Diagnosis Date    Arthritis     OSTEO    Atrial fibrillation       MINI CANELA    Brain tumor (benign)     Hx of being hospitalized 03/06/2019    DSG WITH BRAIN TUMOR-PARATHYROID REMOVED    Hypertension     Irregular heart beat     Lipoma of breast     Lymph edema     Morbid obesity     Neuropathy     HANDS FEET AND FACE    Ovarian cyst     Poor circulation     Tachycardia      Past Surgical History:   Procedure Laterality Date    ADENOIDECTOMY      APPENDECTOMY      CHOLECYSTECTOMY      OOPHORECTOMY      1999    OVARY SURGERY Right     TONSILLECTOMY      TOTAL THYROIDECTOMY       Outpatient Medications Prior to Visit   Medication Sig Dispense Refill    apixaban (ELIQUIS) 5 MG tablet tablet Take 1 tablet by mouth Every 12 (Twelve) Hours. 180 tablet 0    diclofenac (VOLTAREN) 0.1 % ophthalmic solution       metoprolol succinate XL (Toprol XL) 25 MG 24 hr tablet Take 1 tablet by mouth Daily. 90 tablet 0    moxifloxacin (VIGAMOX) 0.5 % ophthalmic solution       multivitamin with minerals tablet tablet Take 1 tablet by mouth Daily.      prednisoLONE acetate (PRED FORTE) 1 % ophthalmic suspension       vitamin E 400 UNIT capsule Take 1 capsule by mouth Daily. 450 units       No facility-administered medications prior to visit.       Allergies as of 09/12/2023    (No Known Allergies)     Social History     Socioeconomic History    Marital status:    Tobacco Use    Smoking status: Never     Passive exposure: Never    Smokeless tobacco: Never   Vaping Use    Vaping Use: Never used   Substance and Sexual Activity    Alcohol use: No    Drug use: No    Sexual activity: Not Currently     Partners: Male     Birth control/protection: Abstinence, None     Comment: 67 years old     Family History   Problem Relation Age of Onset    Diabetes Mother         Never on insulin    Cancer Mother         Breast cancer masectomy 1965    Stroke Mother     Hypertension Mother     Heart disease Father         Heart attack passed 1967  was 45    Diabetes Brother         Passed at 75    Hypertension Brother   "   Malig Hyperthermia Neg Hx      Review of Systems   Constitutional: Negative for chills, fever, weight gain and weight loss.   Cardiovascular:  Positive for leg swelling.   Respiratory:  Negative for cough, snoring and wheezing.    Hematologic/Lymphatic: Negative for bleeding problem. Does not bruise/bleed easily.   Skin:  Negative for color change.   Musculoskeletal:  Negative for falls, joint pain and myalgias.   Gastrointestinal:  Negative for melena.   Genitourinary:  Negative for hematuria.   Neurological:  Negative for excessive daytime sleepiness.   Psychiatric/Behavioral:  Negative for depression. The patient is not nervous/anxious.       Objective:     Vitals:    09/12/23 1148   BP: 140/80   BP Location: Left arm   Pulse: 84   Weight: 129 kg (285 lb 3.2 oz)   Height: 152.4 cm (60\")     Body mass index is 55.7 kg/m².    Vitals reviewed.   Constitutional:       Appearance: Well-developed.   Eyes:      General: No scleral icterus.        Right eye: No discharge.      Conjunctiva/sclera: Conjunctivae normal.      Pupils: Pupils are equal, round, and reactive to light.   HENT:      Head: Normocephalic.      Nose: Nose normal.   Neck:      Thyroid: No thyromegaly.      Vascular: No JVD.   Pulmonary:      Effort: Pulmonary effort is normal. No respiratory distress.      Breath sounds: Normal breath sounds. No wheezing. No rales.   Cardiovascular:      Normal rate. Regular rhythm. Normal S1. Normal S2.       Murmurs: There is no murmur.      No gallop.       Comments: Significant varicosities noted lower extremities, no calf tenderness or cords  Pulses:     Intact distal pulses.      Carotid: 2+ bilaterally.     Radial: 2+ bilaterally.     Femoral: 2+ bilaterally.     Popliteal: 2+ bilaterally.     Dorsalis pedis: 2+ bilaterally.     Posterior tibial: 2+ bilaterally.  Edema:     Peripheral edema absent.   Abdominal:      General: Bowel sounds are normal. There is no distension.      Palpations: Abdomen is soft. "      Tenderness: There is no abdominal tenderness. There is no rebound.   Musculoskeletal: Normal range of motion.         General: No tenderness.      Cervical back: Normal range of motion and neck supple. Skin:     General: Skin is warm and dry.      Findings: No erythema or rash.   Neurological:      Mental Status: Alert and oriented to person, place, and time.   Psychiatric:         Behavior: Behavior normal.         Thought Content: Thought content normal.         Judgment: Judgment normal.     Lab Review:   Lab Results - Last 18 Months   Lab Units 03/17/23  0911 12/06/22  1119   WBC 10*3/mm3 9.70 12.11*   RBC 10*6/mm3 5.42* 5.00   HEMOGLOBIN g/dL 13.9 14.2   HEMATOCRIT % 44.8 42.7   MCV fL 82.7 85.4   MCH pg 25.6* 28.4   MCHC g/dL 31.0* 33.3   RDW % 14.2 13.7   PLATELETS 10*3/mm3 247 243   NEUTROPHIL % % 74.9 77.1*   LYMPHOCYTE % % 16.3* 13.9*   MONOCYTES % % 6.7 6.9   EOSINOPHIL % % 1.4 1.3   BASOPHIL % % 0.4 0.3   NEUTROS ABS 10*3/mm3 7.26* 9.34*   LYMPHS ABS 10*3/mm3 1.58 1.68   MONOS ABS 10*3/mm3 0.65 0.83   EOS ABS 10*3/mm3 0.14 0.16   BASOS ABS 10*3/mm3 0.04 0.04   IMM GRAN % % 0.3 0.5   IMMATURE GRANS (ABS) 10*3/mm3 0.03 0.06*       Lab Results - Last 18 Months   Lab Units 03/17/23  0911 12/06/22  1119   GLUCOSE mg/dL 112* 109*   BUN mg/dL 18 17   CREATININE mg/dL 1.05* 0.88   SODIUM mmol/L 144 144   POTASSIUM mmol/L 4.8 4.5   CHLORIDE mmol/L 103 103   CO2 mmol/L 30.2* 30.6*   CALCIUM mg/dL 9.9 9.1   ALBUMIN g/dL 4.5 4.40   ALT (SGPT) U/L 16 10   AST (SGOT) U/L 16 14   ALK PHOS U/L 92 85   BILIRUBIN mg/dL 0.5 0.4   BUN / CREAT RATIO  17.1 19.3     Lab Results - Last 18 Months   Lab Units 03/17/23  0911 12/06/22  1119   TRIGLYCERIDES mg/dL 141 196*   HDL CHOL mg/dL 47 39*   LDL CHOL mg/dL 145* 129*   VLDL CHOLESTEROL BUSHRA mg/dL 25 35       Lab Results - Last 18 Months   Lab Units 05/02/22  1323   TSH uIU/mL 2.190           ECG 12 Lead    Date/Time: 9/12/2023 1:00 AM  Performed by: Mary Baeza,  MD  Authorized by: Mary Baeza MD   Comparison: compared with previous ECG   Similar to previous ECG  Rhythm: atrial fibrillation  Conduction: right bundle branch block    Clinical impression: abnormal EKG      Assessment:       Diagnosis Plan   1. Longstanding persistent atrial fibrillation  Adult Transthoracic Echo Complete W/ Cont if Necessary Per Protocol    Ambulatory Referral to Neurosurgery    ECG 12 Lead      2. Cerebrovascular dural AV fistula  Ambulatory Referral to Neurosurgery    ECG 12 Lead      3. Atrial fibrillation, persistent  Holter Monitor - 24 Hour        Plan:       1.  Chronic atrial fibrillation, she is on anticoagulant therapy.  Unclear how well rates are controlled.  We will place a 24-hour Holter.  We will also check an echocardiogram given borderline low ejection fraction noted previously.  2.  Chronic anticoagulation.  No falls or bleeding.  3.  Questionable cardiomyopathy, ejection fraction previously very thorough but also to be 49%.  We will check an echocardiogram.  4.  STOP-BANG score is elevated however no clear symptoms of sleep apnea.  She does not wish to pursue further testing.  5.  Cranial AV fistula.  She did not follow-up with neurosurgery and at 1 point there had been discussion of resection I have taken the liberty of requesting neurosurgery consultation.    6.  Dyslipidemia.  Recommend low-cholesterol diet.  Additional recommendations per MILA Roberts  7.  Cranial meningioma  8.  Elevated glucose.  Defer further evaluation recommendations to MILA Roberts      Time Spent: I spent 60 minutes caring for Joan on this date of service. This time includes time spent by me in the following activities: preparing for the visit, reviewing tests, obtaining and/or reviewing a separately obtained history, performing a medically appropriate examination and/or evaluation, counseling and educating the patient/family/caregiver, ordering medications, tests, or procedures, documenting  information in the medical record, and independently interpreting results and communicating that information with the patient/family/caregiver.   I spent 1 minutes on the separately reported service of ECG. This time is not included in the time used to support the E/M service also reported today.        Your medication list            Accurate as of September 12, 2023 11:59 PM. If you have any questions, ask your nurse or doctor.                CONTINUE taking these medications        Instructions Last Dose Given Next Dose Due   apixaban 5 MG tablet tablet  Commonly known as: ELIQUIS      Take 1 tablet by mouth Every 12 (Twelve) Hours.       diclofenac 0.1 % ophthalmic solution  Commonly known as: VOLTAREN           metoprolol succinate XL 25 MG 24 hr tablet  Commonly known as: Toprol XL      Take 1 tablet by mouth Daily.       moxifloxacin 0.5 % ophthalmic solution  Commonly known as: VIGAMOX           multivitamin with minerals tablet tablet      Take 1 tablet by mouth Daily.       prednisoLONE acetate 1 % ophthalmic suspension  Commonly known as: PRED FORTE           vitamin E 400 UNIT capsule      Take 1 capsule by mouth Daily. 450 units                Patient is no longer taking -.  I corrected the med list to reflect this.  I did not stop these medications.      Dictated utilizing Dragon dictation

## 2023-09-14 ENCOUNTER — TELEPHONE (OUTPATIENT)
Dept: CARDIOLOGY | Facility: CLINIC | Age: 68
End: 2023-09-14
Payer: MEDICARE

## 2023-09-14 NOTE — TELEPHONE ENCOUNTER
Please let patient know that the echocardiogram was to check on borderline week heart that was noted several years ago.  I did place an order for 24 Holter as well.

## 2023-09-14 NOTE — TELEPHONE ENCOUNTER
Notified patient of recommendations. Patient verbalized understanding.    Scheduling: any way we can get monitor and echo done on the same day for the patient? Please call her to discuss.    Chiqui Grullon RN  [place] [facility] [place]

## 2023-10-02 ENCOUNTER — TELEPHONE (OUTPATIENT)
Dept: NEUROSURGERY | Facility: CLINIC | Age: 68
End: 2023-10-02

## 2023-10-02 NOTE — TELEPHONE ENCOUNTER
PATIENT CALLED IN AND STATES SHE ACCIDENTALLY CANCELED HER APPOINTMENT AND WANTS TO RESCHEDULE TO SEE DR. OWENS.    PLEASE CALL PATIENT TO RESCHEDULE    THANK YOU!

## 2023-10-16 ENCOUNTER — HOSPITAL ENCOUNTER (OUTPATIENT)
Dept: CARDIOLOGY | Facility: HOSPITAL | Age: 68
Discharge: HOME | End: 2023-10-16
Admitting: INTERNAL MEDICINE
Payer: MEDICARE

## 2023-10-16 VITALS
BODY MASS INDEX: 55.95 KG/M2 | HEART RATE: 100 BPM | WEIGHT: 285 LBS | OXYGEN SATURATION: 96 % | DIASTOLIC BLOOD PRESSURE: 64 MMHG | SYSTOLIC BLOOD PRESSURE: 128 MMHG | HEIGHT: 60 IN

## 2023-10-16 DIAGNOSIS — I48.11 LONGSTANDING PERSISTENT ATRIAL FIBRILLATION: ICD-10-CM

## 2023-10-16 LAB
ASCENDING AORTA: 3.6 CM
BH CV ECHO MEAS - ACS: 1.68 CM
BH CV ECHO MEAS - AO MAX PG: 5.7 MMHG
BH CV ECHO MEAS - AO MEAN PG: 3 MMHG
BH CV ECHO MEAS - AO ROOT DIAM: 3.2 CM
BH CV ECHO MEAS - AO V2 MAX: 119 CM/SEC
BH CV ECHO MEAS - AO V2 VTI: 20.9 CM
BH CV ECHO MEAS - AVA(I,D): 2.7 CM2
BH CV ECHO MEAS - EDV(CUBED): 29.5 ML
BH CV ECHO MEAS - EDV(MOD-SP2): 106 ML
BH CV ECHO MEAS - EDV(MOD-SP4): 92 ML
BH CV ECHO MEAS - EF(MOD-BP): 60.3 %
BH CV ECHO MEAS - EF(MOD-SP2): 57.5 %
BH CV ECHO MEAS - EF(MOD-SP4): 63 %
BH CV ECHO MEAS - ESV(CUBED): 6.2 ML
BH CV ECHO MEAS - ESV(MOD-SP2): 45 ML
BH CV ECHO MEAS - ESV(MOD-SP4): 34 ML
BH CV ECHO MEAS - FS: 40.5 %
BH CV ECHO MEAS - IVS/LVPW: 0.96 CM
BH CV ECHO MEAS - IVSD: 1.12 CM
BH CV ECHO MEAS - LAT PEAK E' VEL: 14.1 CM/SEC
BH CV ECHO MEAS - LV DIASTOLIC VOL/BSA (35-75): 42.4 CM2
BH CV ECHO MEAS - LV MASS(C)D: 105.3 GRAMS
BH CV ECHO MEAS - LV MAX PG: 4.2 MMHG
BH CV ECHO MEAS - LV MEAN PG: 2 MMHG
BH CV ECHO MEAS - LV SYSTOLIC VOL/BSA (12-30): 15.7 CM2
BH CV ECHO MEAS - LV V1 MAX: 103 CM/SEC
BH CV ECHO MEAS - LV V1 VTI: 17.5 CM
BH CV ECHO MEAS - LVIDD: 3.1 CM
BH CV ECHO MEAS - LVIDS: 1.84 CM
BH CV ECHO MEAS - LVOT AREA: 3.3 CM2
BH CV ECHO MEAS - LVOT DIAM: 2.04 CM
BH CV ECHO MEAS - LVPWD: 1.17 CM
BH CV ECHO MEAS - MED PEAK E' VEL: 7.7 CM/SEC
BH CV ECHO MEAS - MV DEC SLOPE: 533 CM/SEC2
BH CV ECHO MEAS - MV DEC TIME: 0.13 SEC
BH CV ECHO MEAS - MV E MAX VEL: 93.8 CM/SEC
BH CV ECHO MEAS - MV MAX PG: 6.6 MMHG
BH CV ECHO MEAS - MV MEAN PG: 2.02 MMHG
BH CV ECHO MEAS - MV P1/2T: 69.8 MSEC
BH CV ECHO MEAS - MV V2 VTI: 28.6 CM
BH CV ECHO MEAS - MVA(P1/2T): 3.2 CM2
BH CV ECHO MEAS - MVA(VTI): 2 CM2
BH CV ECHO MEAS - PA ACC TIME: 0.1 SEC
BH CV ECHO MEAS - PA V2 MAX: 105.3 CM/SEC
BH CV ECHO MEAS - QP/QS: 1.18
BH CV ECHO MEAS - RV MAX PG: 3.3 MMHG
BH CV ECHO MEAS - RV V1 MAX: 90.8 CM/SEC
BH CV ECHO MEAS - RV V1 VTI: 15.9 CM
BH CV ECHO MEAS - RVOT DIAM: 2.33 CM
BH CV ECHO MEAS - SI(MOD-SP2): 28.1 ML/M2
BH CV ECHO MEAS - SI(MOD-SP4): 26.7 ML/M2
BH CV ECHO MEAS - SV(LVOT): 57.3 ML
BH CV ECHO MEAS - SV(MOD-SP2): 61 ML
BH CV ECHO MEAS - SV(MOD-SP4): 58 ML
BH CV ECHO MEAS - SV(RVOT): 67.7 ML
BH CV ECHO MEAS - TAPSE (>1.6): 2.6 CM
BH CV ECHO MEASUREMENTS AVERAGE E/E' RATIO: 8.61
BH CV XLRA - RV BASE: 4 CM
BH CV XLRA - RV LENGTH: 6.5 CM
BH CV XLRA - RV MID: 3.1 CM
BH CV XLRA - TDI S': 11.9 CM/SEC
LEFT ATRIUM VOLUME INDEX: 50.7 ML/M2
SINUS: 3.1 CM
STJ: 2.7 CM

## 2023-10-16 PROCEDURE — 25510000001 PERFLUTREN (DEFINITY) 8.476 MG IN SODIUM CHLORIDE (PF) 0.9 % 10 ML INJECTION: Performed by: INTERNAL MEDICINE

## 2023-10-16 PROCEDURE — 93306 TTE W/DOPPLER COMPLETE: CPT

## 2023-10-16 PROCEDURE — 93306 TTE W/DOPPLER COMPLETE: CPT | Performed by: INTERNAL MEDICINE

## 2023-10-16 RX ADMIN — PERFLUTREN 1.5 ML: 6.52 INJECTION, SUSPENSION INTRAVENOUS at 11:22

## 2023-10-18 ENCOUNTER — TELEPHONE (OUTPATIENT)
Dept: CARDIOLOGY | Facility: CLINIC | Age: 68
End: 2023-10-18
Payer: MEDICARE

## 2023-10-18 DIAGNOSIS — I77.819 ACQUIRED DILATION OF ASCENDING AORTA AND AORTIC ROOT: Primary | ICD-10-CM

## 2023-10-18 NOTE — TELEPHONE ENCOUNTER
Reviewed results and recommendations with Joan Almanza.  Patient verbalized understanding of results and recommendations.    Rafaela,  Patient is agreeable to CT angiogram.  Please place order.  Patient does have [employer] insurance but stated that her echo was covered so she believes the CT angiogram should be covered as well.  Encouraged patient to contact [employer] to verify and she stated she will do this.    Thank you,  La FARFAN RN  Triage Nurse [facility] [place]   13:33 EDT

## 2023-10-18 NOTE — TELEPHONE ENCOUNTER
Please let her know that echo looks good.  Her heart is strong and functioning well.  LVEF 60.3% on the study which is in the normal range.  There was some mild thickening of the heart muscle this could be due to elevated blood pressures.  Would continue checking blood pressure at home and call if consistently greater than 130/80.  There was also a questionable enlargement of her aorta where it leaves the heart.  This was not seen on her echocardiogram in 2019.  We would like to do a CT angiogram to assess this further as CT is the best method of evaluating the aorta.  If she is agreeable to this please let me know and I will place the orders

## 2023-10-26 DIAGNOSIS — I48.91 ATRIAL FIBRILLATION, UNSPECIFIED TYPE: ICD-10-CM

## 2023-10-26 RX ORDER — METOPROLOL SUCCINATE 25 MG/1
25 TABLET, EXTENDED RELEASE ORAL DAILY
Qty: 90 TABLET | Refills: 0 | Status: SHIPPED | OUTPATIENT
Start: 2023-10-26

## 2023-12-12 ENCOUNTER — PATIENT MESSAGE (OUTPATIENT)
Dept: CARDIOLOGY | Facility: CLINIC | Age: 68
End: 2023-12-12
Payer: MEDICARE

## 2023-12-12 DIAGNOSIS — I48.91 ATRIAL FIBRILLATION, UNSPECIFIED TYPE: ICD-10-CM

## 2023-12-12 RX ORDER — METOPROLOL SUCCINATE 25 MG/1
25 TABLET, EXTENDED RELEASE ORAL DAILY
Qty: 90 TABLET | Refills: 1 | Status: SHIPPED | OUTPATIENT
Start: 2023-12-12

## 2024-01-25 ENCOUNTER — TELEPHONE (OUTPATIENT)
Dept: CARDIOLOGY | Facility: CLINIC | Age: 69
End: 2024-01-25
Payer: MEDICARE

## 2024-01-25 NOTE — TELEPHONE ENCOUNTER
Patient was here with her  today and had questions which she related to our medical system.  Please contact this patient and find out what were her concerns?

## 2024-01-29 DIAGNOSIS — I48.91 ATRIAL FIBRILLATION, UNSPECIFIED TYPE: ICD-10-CM

## 2024-01-29 NOTE — TELEPHONE ENCOUNTER
Caller: Joan Almanza    Relationship: Self    Best call back number: [phone]    Requested Prescriptions:   Requested Prescriptions     Pending Prescriptions Disp Refills    apixaban (ELIQUIS) 5 MG tablet tablet 60 tablet 0     Sig: Take 1 tablet by mouth Every 12 (Twelve) Hours.    metoprolol succinate XL (Toprol XL) 25 MG 24 hr tablet 90 tablet 1     Sig: Take 1 tablet by mouth Daily.        Pharmacy where request should be sent: [email or web address], [address] [phone] [place] [phone]      Last office visit with prescribing clinician: 9/12/2023   Last telemedicine visit with prescribing clinician: Visit date not found   Next office visit with prescribing clinician: Visit date not found     Additional details provided by patient: PT HAS NEW INSURANCE AND WILL GET PRESCRIPTIONS FROM [email or web address]IL [facility]  90 DAY SUPPLY    Does the patient have less than a 3 day supply:  [] Yes  [x] No    Would you like a call back once the refill request has been completed: [] Yes [x] No    If the office needs to give you a call back, can they leave a voicemail: [x] Yes [] No    Deandre Doll Rep   01/29/24 15:23 EST

## 2024-01-30 RX ORDER — METOPROLOL SUCCINATE 25 MG/1
25 TABLET, EXTENDED RELEASE ORAL DAILY
Qty: 90 TABLET | Refills: 1 | Status: SHIPPED | OUTPATIENT
Start: 2024-01-30 | End: 2024-02-01 | Stop reason: SDUPTHER

## 2024-01-31 NOTE — TELEPHONE ENCOUNTER
Spoke with Joan and this was address.  She wanted to add the insurance, which was completed.  (Done)

## 2024-02-01 DIAGNOSIS — I48.91 ATRIAL FIBRILLATION, UNSPECIFIED TYPE: ICD-10-CM

## 2024-02-01 RX ORDER — METOPROLOL SUCCINATE 25 MG/1
25 TABLET, EXTENDED RELEASE ORAL DAILY
Qty: 90 TABLET | Refills: 3 | Status: SHIPPED | OUTPATIENT
Start: 2024-02-01

## 2024-03-06 ENCOUNTER — HOSPITAL ENCOUNTER (OUTPATIENT)
Facility: HOSPITAL | Age: 69
Discharge: HOME | End: 2024-03-06
Admitting: NURSE PRACTITIONER
Payer: MEDICARE

## 2024-03-06 DIAGNOSIS — I77.819 ACQUIRED DILATION OF ASCENDING AORTA AND AORTIC ROOT: ICD-10-CM

## 2024-03-06 PROCEDURE — 25510000001 IOPAMIDOL PER 1 ML: Performed by: NURSE PRACTITIONER

## 2024-03-06 PROCEDURE — 71275 CT ANGIOGRAPHY CHEST: CPT

## 2024-03-06 RX ADMIN — IOPAMIDOL 85 ML: 755 INJECTION, SOLUTION INTRAVENOUS at 10:34

## 2024-03-11 ENCOUNTER — OFFICE VISIT (OUTPATIENT)
Dept: CARDIOLOGY | Facility: CLINIC | Age: 69
End: 2024-03-11
Payer: MEDICARE

## 2024-03-11 ENCOUNTER — TELEPHONE (OUTPATIENT)
Dept: CARDIOLOGY | Facility: CLINIC | Age: 69
End: 2024-03-11

## 2024-03-11 VITALS
HEIGHT: 60 IN | BODY MASS INDEX: 57.52 KG/M2 | SYSTOLIC BLOOD PRESSURE: 138 MMHG | DIASTOLIC BLOOD PRESSURE: 84 MMHG | WEIGHT: 293 LBS | HEART RATE: 78 BPM

## 2024-03-11 DIAGNOSIS — I48.11 LONGSTANDING PERSISTENT ATRIAL FIBRILLATION: Primary | ICD-10-CM

## 2024-03-11 DIAGNOSIS — I67.1 CEREBROVASCULAR DURAL AV FISTULA: ICD-10-CM

## 2024-03-11 DIAGNOSIS — I10 PRIMARY HYPERTENSION: ICD-10-CM

## 2024-03-11 DIAGNOSIS — E66.01 CLASS 3 SEVERE OBESITY DUE TO EXCESS CALORIES WITHOUT SERIOUS COMORBIDITY WITH BODY MASS INDEX (BMI) OF 50.0 TO 59.9 IN ADULT: ICD-10-CM

## 2024-03-11 DIAGNOSIS — Z53.20 STATIN MEDICATION DECLINED BY PATIENT: ICD-10-CM

## 2024-03-11 DIAGNOSIS — I77.819 ACQUIRED DILATION OF ASCENDING AORTA AND AORTIC ROOT: ICD-10-CM

## 2024-03-11 DIAGNOSIS — E78.5 DYSLIPIDEMIA: ICD-10-CM

## 2024-03-11 DIAGNOSIS — R29.818 SUSPECTED SLEEP APNEA: ICD-10-CM

## 2024-03-11 DIAGNOSIS — D32.9 MENINGIOMA: ICD-10-CM

## 2024-03-11 PROCEDURE — 3079F DIAST BP 80-89 MM HG: CPT | Performed by: NURSE PRACTITIONER

## 2024-03-11 PROCEDURE — 99214 OFFICE O/P EST MOD 30 MIN: CPT | Performed by: NURSE PRACTITIONER

## 2024-03-11 PROCEDURE — 3075F SYST BP GE 130 - 139MM HG: CPT | Performed by: NURSE PRACTITIONER

## 2024-03-11 PROCEDURE — 93000 ELECTROCARDIOGRAM COMPLETE: CPT | Performed by: NURSE PRACTITIONER

## 2024-03-11 NOTE — TELEPHONE ENCOUNTER
Spoke with Maira with CT Angiogram and she states it takes a little longer to read as there are 2 doctors involved in the reading. She moved her up to have read.

## 2024-03-11 NOTE — TELEPHONE ENCOUNTER
Results and recommendations called to pt.  Instructed to call with any further questions or concerns.  Verbalized understanding.  [facility].    Susan Messina RN  Triage Nurse, [facility] [place]  03/11/24 14:58 EDT

## 2024-03-11 NOTE — TELEPHONE ENCOUNTER
Please let her know that CTA did not show any enlargement of her aorta.  There were some tiny (less than 6 mm) pulmonary nodules that per radiologist recommendations do not need follow-up.  There was also mention of an outpouching in her esophagus.  This can be followed by her PCP or GI if she sees someone already.  Would continue current medications and keep currently scheduled follow-up appointment.

## 2024-03-11 NOTE — TELEPHONE ENCOUNTER
Called and left VM, will continue to try to reach pt.    HUB- please put patient straight through to triage    Susan Messina, RN  Triage RN  03/11/24 12:57 EDT

## 2024-03-11 NOTE — PROGRESS NOTES
Date of Office Visit: 2024  Encounter Provider: MILA Carr  Place of Service: [facility] CARDIOLOGY  Patient Name: Joan Almanza  :1955    Chief complaint  atrial fibrillation     History of Present Illness  Patient is a 69 y.o. year old female  patient of Dr. Baeza. Past medical history includes hypertension, lymphedema, morbid obesity hyperparathyroidism, meningioma, cranial AV fistula and atrial fibrillation.  Patient was seen 3/2019 for bradycardia in the setting of hypertension and cerebral edema and diplopia.  She underwent parathyroidectomy in hospital.  Postoperatively she developed atrial fibrillation with rapid rates initially elevated but then controlled during her hospitalization.  Echo at this time showed an ejection fraction of 49% the estimated EF slightly higher with the timing of the focal study.  There is mild left ventricular hypertrophy and no significant valve dysfunction. She was dismissed home on metoprolol and Eliquis.  However after a period of time she stopped metoprolol on her own as she did not feel it was helping her symptoms.  By May 2023 she was noted to have possible rapid heartbeat and metoprolol was added again to her regimen of Eliquis.      Echocardiogram 10/16/2023 showed LVEF 60.3%, mild concentric LVH, indeterminate diastolic function, moderate to severely dilated right atrial cavity, mild mitral annular calcification with no regurgitation or stenosis, and borderline dilation of the ascending aorta at 3.6 cm.  Subsequent CT angiogram of the chest was performed on 3/6/2024, however results are not available at the time of this visit.  24-hour Holter was ordered but not completed due to insurance issues.  She has also declined evaluation for sleep apnea despite elevated STOP-BANG score.    Interval history  Patient presents today for routine follow-up.  I will visit with her for the first time today and have reviewed her  medical record.  Since last visit she has been doing well.  She denies palpitations, edema, dizziness, chest pain or chest pressure, fatigue, syncope or presyncope.  She is active walking her dogs and doing chair exercises and denies exertional symptoms.  She does have baseline shortness of breath that she states is unchanged.  Blood pressure today is slightly elevated but she states readings at home are anywhere from 115-130/80.  Heart rate has been stable at home in the 70s.    Past Medical History:   Diagnosis Date    Arthritis     OSTEO    Atrial fibrillation 2019    DR. ELANA CANELA    Brain tumor (benign)     Hx of being hospitalized 03/06/2019    DSG WITH BRAIN TUMOR-PARATHYROID REMOVED    Hypertension     Irregular heart beat     Lipoma of breast     Lymph edema     Morbid obesity     Neuropathy     HANDS FEET AND FACE    Ovarian cyst     Poor circulation     Tachycardia      Past Surgical History:   Procedure Laterality Date    ADENOIDECTOMY      APPENDECTOMY      CHOLECYSTECTOMY      OOPHORECTOMY      1999    OVARY SURGERY Right     TONSILLECTOMY      TOTAL THYROIDECTOMY       Outpatient Medications Prior to Visit   Medication Sig Dispense Refill    apixaban (ELIQUIS) 5 MG tablet tablet Take 1 tablet by mouth Every 12 (Twelve) Hours. 180 tablet 3    metoprolol succinate XL (Toprol XL) 25 MG 24 hr tablet Take 1 tablet by mouth Daily. 90 tablet 3    multivitamin with minerals tablet tablet Take 1 tablet by mouth Daily.      vitamin E 400 UNIT capsule Take 1 capsule by mouth Daily. 450 units      diclofenac (VOLTAREN) 0.1 % ophthalmic solution       moxifloxacin (VIGAMOX) 0.5 % ophthalmic solution       prednisoLONE acetate (PRED FORTE) 1 % ophthalmic suspension        No facility-administered medications prior to visit.       Allergies as of 03/11/2024    (No Known Allergies)     Social History     Socioeconomic History    Marital status:    Tobacco Use    Smoking status: Never     Passive exposure: Never  "   Smokeless tobacco: Never   Vaping Use    Vaping status: Never Used   Substance and Sexual Activity    Alcohol use: No    Drug use: No    Sexual activity: Not Currently     Partners: Male     Birth control/protection: Abstinence, None     Comment: 67 years old     Family History   Problem Relation Age of Onset    Diabetes Mother         Never on insulin    Cancer Mother         Breast cancer masectomy 1965    Stroke Mother     Hypertension Mother     Heart disease Father         Heart attack passed 1967  was 45    Diabetes Brother         Passed at 75    Hypertension Brother     Malig Hyperthermia Neg Hx      Review of Systems   Constitutional: Negative for malaise/fatigue.   HENT:  Negative for nosebleeds.    Cardiovascular:  Negative for chest pain, claudication, dyspnea on exertion, leg swelling, near-syncope, orthopnea, palpitations, paroxysmal nocturnal dyspnea and syncope.   Respiratory:  Positive for shortness of breath.    Hematologic/Lymphatic: Negative for bleeding problem. Does not bruise/bleed easily.   Gastrointestinal:  Negative for hematemesis, hematochezia and melena.   Genitourinary:  Negative for hematuria.   Neurological:  Negative for brief paralysis, dizziness, headaches and light-headedness.   All other systems reviewed and are negative.       Objective:     Vitals:    03/11/24 1124   BP: 138/84   BP Location: Left arm   Patient Position: Sitting   Cuff Size: Small Adult   Pulse: 78   Weight: 134 kg (296 lb)   Height: 152.4 cm (60\")     Body mass index is 57.81 kg/m².    Vitals reviewed.   Constitutional:       General: Not in acute distress.     Appearance: Well-developed and not in distress. Morbidly obese. Not diaphoretic.      Comments: Walks with a cane   HENT:      Head: Normocephalic.   Pulmonary:      Effort: Pulmonary effort is normal. No respiratory distress.      Breath sounds: Normal breath sounds. No wheezing. No rhonchi. No rales.   Cardiovascular:      Normal rate. Irregularly " "irregular rhythm.      Murmurs: There is no murmur.   Pulses:     Radial: 2+ bilaterally.  Edema:     Peripheral edema absent.   Skin:     General: Skin is warm and dry. There is no cyanosis.      Findings: No rash.   Neurological:      Mental Status: Alert and oriented to person, place, and time.   Psychiatric:         Behavior: Behavior normal. Behavior is cooperative.         Thought Content: Thought content normal.         Judgment: Judgment normal.       Lab Review:     Lab Results   Component Value Date     03/17/2023     12/06/2022    K 4.8 03/17/2023    K 4.5 12/06/2022     03/17/2023     12/06/2022    CO2 30.2 (H) 03/17/2023    CO2 30.6 (H) 12/06/2022    BUN 18 03/17/2023    BUN 17 12/06/2022    CREATININE 1.05 (H) 03/17/2023    CREATININE 0.88 12/06/2022    EGFRIFNONA 70 09/22/2020    EGFRIFNONA 54 (L) 04/22/2019    EGFRIFAFRI 85 09/22/2020    EGFRIFAFRI 58 (L) 03/27/2019    GLUCOSE 112 (H) 03/17/2023    GLUCOSE 109 (H) 12/06/2022    CALCIUM 9.9 03/17/2023    CALCIUM 9.1 12/06/2022    PROTENTOTREF 8.0 03/17/2023    PROTENTOTREF 7.5 12/06/2022    ALBUMIN 4.5 03/17/2023    ALBUMIN 4.40 12/06/2022    BILITOT 0.5 03/17/2023    BILITOT 0.4 12/06/2022    AST 16 03/17/2023    AST 14 12/06/2022    ALT 16 03/17/2023    ALT 10 12/06/2022     Lab Results   Component Value Date    WBC 9.70 03/17/2023    WBC 12.11 (H) 12/06/2022    HGB 13.9 03/17/2023    HGB 14.2 12/06/2022    HCT 44.8 03/17/2023    HCT 42.7 12/06/2022    MCV 82.7 03/17/2023    MCV 85.4 12/06/2022     03/17/2023     12/06/2022     No results found for: \"PROBNP\", \"BNP\"  No results found for: \"CKTOTAL\", \"CKMB\", \"CKMBINDEX\", \"TROPONINI\", \"TROPONINT\"  Lab Results   Component Value Date    TSH 2.190 05/02/2022    TSH 2.930 09/22/2020             ECG 12 Lead    Date/Time: 3/11/2024 1:14 PM  Performed by: Rafaela Francisco APRN    Authorized by: Rafaela Francisco APRN  Comparison: compared with previous ECG   Similar " to previous ECG  Rhythm: atrial fibrillation  Rate: normal  BPM: 78  Conduction: right bundle branch block  QRS axis: normal  Comments: Similar to prior        Assessment:       Diagnosis Plan   1. Longstanding persistent atrial fibrillation        2. Cerebrovascular dural AV fistula        3. Statin medication declined by patient        4. Class 3 severe obesity due to excess calories without serious comorbidity with body mass index (BMI) of 50.0 to 59.9 in adult        5. Suspected sleep apnea        6. Acquired dilation of ascending aorta and aortic root        7. Primary hypertension        8. Dyslipidemia        9. Meningioma          Plan:       1.  Chronic atrial fibrillation, she is on anticoagulant therapy. Rates are well controlled per her report. She did not complete Holter monitor due to insurance issues. Echo with improvement in EF.   2.  Chronic anticoagulation.  No falls or bleeding.  3.  Questionable cardiomyopathy, ejection fraction previously very thorough but also to be 49%.  Repeat echo October 2023 with LVEF 60%. Will hold off on Holter monitoring for now with improvement in EF. She will call if she develops palpitations or notices higher heart rates at home and will reconsider Holter at that time.  4.  STOP-BANG score is elevated however no clear symptoms of sleep apnea.  She does not wish to pursue further testing.  5.  Cranial AV fistula.  She was referred to Dr. Parks but subsequently canceled 2 appointments and has not rescheduled.  6.  Dyslipidemia.  Recommend low-cholesterol diet.  Additional recommendations per MILA Roberts. She has declined statin therapy  7.  Cranial meningioma  8.  Elevated glucose.  Defer further evaluation recommendations to MILA Roberts  9.  Borderline dilated aorta seen on echocardiogram.  CTA did not show dilation of aorta.      Time Spent: I spent 30 minutes caring for Joan on this date of service. This time includes time spent by me in the following activities:  preparing for the visit, reviewing tests, performing a medically appropriate examination and/or evaluations, counseling and educating the patient/family/caregiver, ordering medications, tests, or procedures, documenting information in the medical record, and independently interpreting results and communicating that information with the patient/family/caregiver.   I spent 1 minutes on the separately reported service of ECG. This time is not included in the time used to support the E/M service also reported today.        Your medication list            Accurate as of March 11, 2024  1:16 PM. If you have any questions, ask your nurse or doctor.                CONTINUE taking these medications        Instructions Last Dose Given Next Dose Due   apixaban 5 MG tablet tablet  Commonly known as: ELIQUIS      Take 1 tablet by mouth Every 12 (Twelve) Hours.       metoprolol succinate XL 25 MG 24 hr tablet  Commonly known as: Toprol XL      Take 1 tablet by mouth Daily.       multivitamin with minerals tablet tablet      Take 1 tablet by mouth Daily.       vitamin E 400 UNIT capsule      Take 1 capsule by mouth Daily. 450 units                Patient is no longer taking -.  I corrected the med list to reflect this.  I did not stop these medications.    Return in about 6 months (around 9/11/2024) for with Dr. Baeza.      Dictated utilizing Dragon dictation

## 2024-10-04 ENCOUNTER — TELEPHONE (OUTPATIENT)
Dept: CARDIOLOGY | Facility: CLINIC | Age: 69
End: 2024-10-04

## 2024-10-04 ENCOUNTER — OFFICE VISIT (OUTPATIENT)
Dept: CARDIOLOGY | Facility: CLINIC | Age: 69
End: 2024-10-04
Payer: MEDICARE

## 2024-10-04 VITALS
WEIGHT: 284 LBS | SYSTOLIC BLOOD PRESSURE: 120 MMHG | BODY MASS INDEX: 55.76 KG/M2 | HEIGHT: 60 IN | DIASTOLIC BLOOD PRESSURE: 70 MMHG | HEART RATE: 99 BPM

## 2024-10-04 DIAGNOSIS — I48.91 ATRIAL FIBRILLATION, UNSPECIFIED TYPE: ICD-10-CM

## 2024-10-04 DIAGNOSIS — I10 PRIMARY HYPERTENSION: ICD-10-CM

## 2024-10-04 DIAGNOSIS — D32.9 MENINGIOMA: ICD-10-CM

## 2024-10-04 DIAGNOSIS — I48.19 ATRIAL FIBRILLATION, PERSISTENT: Primary | ICD-10-CM

## 2024-10-04 NOTE — PROGRESS NOTES
Date of Office Visit: 10/04/2024  Encounter Provider: Elana Canela MD  Place of Service: [facility] CARDIOLOGY  Patient Name: Joan Almanza  :1955    Chief complaint  Atrial fibrillation, cardiomyopathy    History of Present Illness  Patient is a 69-year-old female with hypertension, hypertensive heart disease, lymphedema, morbid obesity, hyperparathyroidism, meningioma, cranial AV fistula and paroxysmal atrial fibrillation.  In  in the setting of bradycardia and hypertension with cerebral edema and diplopia mild bradycardia was noted.  She underwent parathyroidectomy and postoperatively developed atrial fibrillation rapid rates which is controlled in hospital.  Echo at that time showed an ejection fraction 49% and she was sent home on metoprolol and Eliquis.  At some point she discontinued metoprolol.  By May 2023 she was noted to have rapid heartbeat and metoprolol was resumed.  By 2023 ejection fraction by echo was 60% with mild left ventricular pretrip he, indeterminate diastolic dysfunction, moderate-severe atrial enlargement, no significant valve heart disease.  The acing aorta was borderline dilated 3.6 cm.  CT angiogram of the chest showed no aortic aneurysm with few pulmonary nodules were felt to be benign and a mid esophageal diverticulum also was present    Since last visit she has had no palpitations shortness of breath dizziness or chest pain.  Edema has improved slightly.  She is active playing in the garden with her dog.  I do not see that a sleep study has been completed due to lack of insurance coverage.      Past Medical History:   Diagnosis Date    Arthritis     OSTEO    Atrial fibrillation     DR. ELANA CANELA    Brain tumor (benign)     Hx of being hospitalized 2019    DSG WITH BRAIN TUMOR-PARATHYROID REMOVED    Hypertension     Irregular heart beat     Lipoma of breast     Lymph edema     Morbid obesity     Neuropathy     HANDS FEET AND  FACE    Ovarian cyst     Poor circulation     Tachycardia      Past Surgical History:   Procedure Laterality Date    ADENOIDECTOMY      APPENDECTOMY      CHOLECYSTECTOMY      OOPHORECTOMY      1999    OVARY SURGERY Right     TONSILLECTOMY      TOTAL THYROIDECTOMY       Outpatient Medications Prior to Visit   Medication Sig Dispense Refill    apixaban (ELIQUIS) 5 MG tablet tablet Take 1 tablet by mouth Every 12 (Twelve) Hours. 180 tablet 3    metoprolol succinate XL (Toprol XL) 25 MG 24 hr tablet Take 1 tablet by mouth Daily. 90 tablet 3    multivitamin with minerals tablet tablet Take 1 tablet by mouth Daily.      vitamin E 400 UNIT capsule Take 1 capsule by mouth Daily. 450 units       No facility-administered medications prior to visit.       Allergies as of 10/04/2024    (No Known Allergies)     Social History     Socioeconomic History    Marital status:    Tobacco Use    Smoking status: Never     Passive exposure: Never    Smokeless tobacco: Never   Vaping Use    Vaping status: Never Used   Substance and Sexual Activity    Alcohol use: No    Drug use: No    Sexual activity: Not Currently     Partners: Male     Birth control/protection: Abstinence, None     Comment: 67 years old     Family History   Problem Relation Age of Onset    Diabetes Mother         Never on insulin    Cancer Mother         Breast cancer masectomy 1965    Stroke Mother     Hypertension Mother     Heart disease Father         Heart attack passed 1967  was 45    Diabetes Brother         Passed at 75    Hypertension Brother     Malig Hyperthermia Neg Hx      Review of Systems   Constitutional: Negative for chills, fever, weight gain and weight loss.   Cardiovascular:  Negative for leg swelling.   Respiratory:  Negative for cough, snoring and wheezing.    Hematologic/Lymphatic: Negative for bleeding problem. Does not bruise/bleed easily.   Skin:  Negative for color change.   Musculoskeletal:  Negative for falls, joint pain and myalgias.  "  Gastrointestinal:  Negative for melena.   Genitourinary:  Negative for hematuria.   Neurological:  Negative for excessive daytime sleepiness.   Psychiatric/Behavioral:  Negative for depression. The patient is not nervous/anxious.         Objective:     Vitals:    10/04/24 1006   BP: 120/70   Pulse: 99   Weight: 129 kg (284 lb)   Height: 152.4 cm (60\")     Body mass index is 55.46 kg/m².    Vitals reviewed.   Constitutional:       Appearance: Well-developed. Morbidly obese.   Eyes:      General: No scleral icterus.        Right eye: No discharge.      Conjunctiva/sclera: Conjunctivae normal.      Pupils: Pupils are equal, round, and reactive to light.   HENT:      Head: Normocephalic.      Nose: Nose normal.   Neck:      Thyroid: No thyromegaly.      Vascular: No JVD.   Pulmonary:      Effort: Pulmonary effort is normal. No respiratory distress.      Breath sounds: Normal breath sounds. No wheezing. No rales.   Cardiovascular:      Normal rate. Irregularly irregular rhythm. Normal S1. Normal S2.       Murmurs: There is no murmur.      No gallop.       Comments: Left leg lymphedema, bilateral varicosities  Pulses:     Intact distal pulses.      Carotid: 2+ bilaterally.     Radial: 2+ bilaterally.     Femoral: 2+ bilaterally.     Popliteal: 2+ bilaterally.     Dorsalis pedis: 2+ bilaterally.     Posterior tibial: 2+ bilaterally.  Edema:     Peripheral edema present.  Abdominal:      General: Bowel sounds are normal. There is no distension.      Palpations: Abdomen is soft.      Tenderness: There is no abdominal tenderness. There is no rebound.   Musculoskeletal: Normal range of motion.         General: No tenderness.      Cervical back: Normal range of motion and neck supple. Skin:     General: Skin is warm and dry.      Findings: No erythema or rash.   Neurological:      Mental Status: Alert and oriented to person, place, and time.   Psychiatric:         Behavior: Behavior normal.         Thought Content: Thought " content normal.         Judgment: Judgment normal.       Lab Review:         ECG 12 Lead    Date/Time: 10/4/2024 10:17 AM  Performed by: Mary Baeza MD    Authorized by: Mary Baeza MD  Comparison: compared with previous ECG   Rhythm: atrial fibrillation  Conduction: right bundle branch block    Clinical impression: abnormal EKG            Diagnosis Plan   1. Atrial fibrillation, persistent  ECG 12 Lead      2. Atrial fibrillation, unspecified type        3. Primary hypertension        4. Meningioma          Plan:       1.  Paroxysmal atrial fibrillation chronic atrial fibrillation.  Rates controlled and remains on Eliquis.  Rates slightly elevated today though she states rates at home are typically in the 50s to 80s.  She is tolerating Eliquis well.  She anticipates getting blood work with MILA Roberts in the next 3 to 4 weeks.  She will forward these.  2.  Transient cardiomyopathy in the setting of tachycardia.  Ejection fraction on 10/2023 was normal.  3.  Elevated STOP-BANG score.  Patient declined further evaluation for sleep apnea   4.  Cranial AV fistula. Recommendations per neurosurgery  5.  Dyslipidemia  6.  Elevated glucose      Time Spent: I spent 25 minutes caring for Joan on this date of service. This time includes time spent by me in the following activities: preparing for the visit, reviewing tests, obtaining and/or reviewing a separately obtained history, performing a medically appropriate examination and/or evaluation, counseling and educating the patient/family/caregiver, documenting information in the medical record, and independently interpreting results and communicating that information with the patient/family/caregiver.   I spent 1 minutes on the separately reported service of ECG. This time is not included in the time used to support the E/M service also reported today.        Your medication list            Accurate as of October 4, 2024 11:59 PM. If you have any questions, ask your nurse  or doctor.                CONTINUE taking these medications        Instructions Last Dose Given Next Dose Due   apixaban 5 MG tablet tablet  Commonly known as: ELIQUIS      Take 1 tablet by mouth Every 12 (Twelve) Hours.       metoprolol succinate XL 25 MG 24 hr tablet  Commonly known as: Toprol XL      Take 1 tablet by mouth Daily.       multivitamin with minerals tablet tablet      Take 1 tablet by mouth Daily.       vitamin E 400 UNIT capsule      Take 1 capsule by mouth Daily. 450 units                Patient is no longer taking -.  I corrected the med list to reflect this.  I did not stop these medications.      Dictated utilizing Dragon dictation

## 2024-10-25 NOTE — TELEPHONE ENCOUNTER
Results and recommendations called to pt.  Instructed to call with any further questions or concerns.  Verbalized understanding.    Dr. Baeza- pt asked me to let you know that her PCP also has started her on atorvastatin (she is unsure of dose, but states that she will send you that information via [employer] when she is home).    Susan Messina RN  Triage Nurse, [facility]  10/25/24 15:42 EDT

## 2024-10-25 NOTE — TELEPHONE ENCOUNTER
Please let patient know that assuming these labs are fasting labs, LDL is 130, HDL 47, triglycerides 166.  LDL is too high HDL slightly low triglycerides slightly elevated.  Have her stay on a low-cholesterol diet low carbohydrate diet increase exercise and increase exercise as able

## 2024-10-28 NOTE — TELEPHONE ENCOUNTER
Reviewed recommendations with patient, verbalized understanding, will call with any further questions or complaints.    Susan Messina RN  Triage Nurse  10/28/24 09:10 EDT

## 2025-01-27 DIAGNOSIS — I48.91 ATRIAL FIBRILLATION, UNSPECIFIED TYPE: ICD-10-CM

## 2025-01-28 NOTE — TELEPHONE ENCOUNTER
Lv 10/4/24 MKD   Nxt 4/17/25 SW   Labs 10/18/24     Return for followup with APRN in 6 months and me in 1 yr.

## 2025-01-31 ENCOUNTER — TELEPHONE (OUTPATIENT)
Dept: CARDIOLOGY | Facility: CLINIC | Age: 70
End: 2025-01-31
Payer: MEDICARE

## 2025-01-31 NOTE — TELEPHONE ENCOUNTER
MA reached out to pt who is aware need her portion of Patient Enrollment form to complete application from [employer].     Pt will fill out form and sent it in.

## 2025-04-15 ENCOUNTER — TELEPHONE (OUTPATIENT)
Dept: CARDIOLOGY | Age: 70
End: 2025-04-15

## 2025-04-15 NOTE — TELEPHONE ENCOUNTER
Caller: DALILA    Relationship: SELF    Best call back number: [phone]         What was the call regarding:  NEEDS TO RESCHEDULE 4/17/25 APPOINTMENT DUE TO KNEE INJURY        PT'S   WILL BE IN OFFICE 5/7/25- ANTHONY IS AT [facility] THAT DAY, CAN SHE SEE ANOTHER NURSE AROUND 11AM-12PM

## 2025-04-16 NOTE — TELEPHONE ENCOUNTER
Called Joan Almanza to reschedule appointment.  Patient reports she has been using voltaren on her knee and that she thinks she can keep the appointment scheduled for 4/17/2025.  She declined moving appointment.    Thank you,  La FARFAN RN  Triage Nurse MIKE  04/16/25 12:12 EDT

## 2025-04-17 ENCOUNTER — OFFICE VISIT (OUTPATIENT)
Dept: CARDIOLOGY | Age: 70
End: 2025-04-17
Payer: MEDICARE

## 2025-04-17 VITALS
DIASTOLIC BLOOD PRESSURE: 86 MMHG | SYSTOLIC BLOOD PRESSURE: 124 MMHG | WEIGHT: 286 LBS | HEIGHT: 60 IN | HEART RATE: 83 BPM | BODY MASS INDEX: 56.15 KG/M2

## 2025-04-17 DIAGNOSIS — I10 PRIMARY HYPERTENSION: ICD-10-CM

## 2025-04-17 DIAGNOSIS — I48.19 ATRIAL FIBRILLATION, PERSISTENT: Primary | ICD-10-CM

## 2025-04-17 DIAGNOSIS — E78.5 DYSLIPIDEMIA: ICD-10-CM

## 2025-04-17 DIAGNOSIS — R29.818 SUSPECTED SLEEP APNEA: ICD-10-CM

## 2025-04-17 DIAGNOSIS — D32.9 MENINGIOMA: ICD-10-CM

## 2025-04-17 RX ORDER — ATORVASTATIN CALCIUM 10 MG/1
10 TABLET, FILM COATED ORAL NIGHTLY
COMMUNITY
Start: 2025-02-08

## 2025-04-17 NOTE — PROGRESS NOTES
Date of Office Visit: 2025  Encounter Provider: MILA Carr  Place of Service: [facility] CARDIOLOGY  Patient Name: Joan Almanza  :1955    Chief complaint  Atrial fibrillation, cardiomyopathy     History of Present Illness  Patient is a 70 y.o. year old female  patient of Dr. Baeza. Past medical history includes hypertension, hypertensive heart disease, lymphedema, morbid obesity, hyperparathyroidism, meningioma, cranial AV fistula and paroxysmal atrial fibrillation. In  in the setting of bradycardia and hypertension with cerebral edema and diplopia mild bradycardia was noted. She underwent parathyroidectomy and postoperatively developed atrial fibrillation rapid rates which is controlled in hospital. Echo at that time showed an ejection fraction 49% and she was sent home on metoprolol and Eliquis. At some point she discontinued metoprolol. By May 2023 she was noted to have rapid heartbeat and metoprolol was resumed. By 2023 ejection fraction by echo was 60% with mild left ventricular pretrip he, indeterminate diastolic dysfunction, moderate-severe atrial enlargement, no significant valve heart disease. The ascending aorta was borderline dilated 3.6 cm. CT angiogram of the chest showed no aortic aneurysm with few pulmonary nodules were felt to be benign and a mid esophageal diverticulum also was present.     Interval history  Patient presents today for routine follow up. I will visit with her today and have reviewed her medical record. Since last visit, she has been doing well. She denies palpitations, shortness of breath, edema, dizziness, chest pain or chest pressure, fatigue, syncope or presyncope.  She is struggling with a recent knee injury but is overall active in the garden and walking.  Blood pressure in office today is well-controlled.  Labs 2024 with normal hemoglobin and platelet count, slightly elevated glucose but otherwise  unremarkable CMP, , HDL 47, triglycerides 166.    Past Medical History:   Diagnosis Date    Arthritis     OSTEO    Atrial fibrillation 2019    DR. ELANA CANELA    Brain tumor (benign)     Hx of being hospitalized 03/06/2019    DSG WITH BRAIN TUMOR-PARATHYROID REMOVED    Hypertension     Irregular heart beat     Lipoma of breast     Lymph edema     Morbid obesity     Neuropathy     HANDS FEET AND FACE    Ovarian cyst     Poor circulation     Tachycardia      Past Surgical History:   Procedure Laterality Date    ADENOIDECTOMY      APPENDECTOMY      CHOLECYSTECTOMY      OOPHORECTOMY      1999    OVARY SURGERY Right     TONSILLECTOMY      TOTAL THYROIDECTOMY       Outpatient Medications Prior to Visit   Medication Sig Dispense Refill    apixaban (ELIQUIS) 5 MG tablet tablet Take 1 tablet by mouth Every 12 (Twelve) Hours. 60 tablet 11    atorvastatin (LIPITOR) 10 MG tablet Take 1 tablet by mouth Every Night.      metoprolol succinate XL (Toprol XL) 25 MG 24 hr tablet Take 1 tablet by mouth Daily. 90 tablet 3    multivitamin with minerals tablet tablet Take 1 tablet by mouth Daily.      vitamin E 400 UNIT capsule Take 1 capsule by mouth Daily. 450 units       No facility-administered medications prior to visit.       Allergies as of 04/17/2025    (No Known Allergies)     Social History     Socioeconomic History    Marital status:    Tobacco Use    Smoking status: Never     Passive exposure: Never    Smokeless tobacco: Never   Vaping Use    Vaping status: Never Used   Substance and Sexual Activity    Alcohol use: No    Drug use: No    Sexual activity: Not Currently     Partners: Male     Birth control/protection: Abstinence, None     Comment: 67 years old     Family History   Problem Relation Age of Onset    Diabetes Mother         Never on insulin    Cancer Mother         Breast cancer masectomy 1965    Stroke Mother     Hypertension Mother     Heart disease Father         Heart attack passed 1967  was 45     "Diabetes Brother         Passed at 75    Hypertension Brother     Malig Hyperthermia Neg Hx      Review of Systems   Constitutional: Negative for malaise/fatigue.   Cardiovascular:  Negative for chest pain, claudication, dyspnea on exertion, leg swelling, near-syncope, orthopnea, palpitations, paroxysmal nocturnal dyspnea and syncope.   Respiratory:  Negative for shortness of breath.    Neurological:  Negative for brief paralysis, dizziness, headaches and light-headedness.   All other systems reviewed and are negative.       Objective:     Vitals:    04/17/25 1015   BP: 124/86   BP Location: Left arm   Patient Position: Sitting   Cuff Size: Large Adult   Pulse: 83   Weight: 130 kg (286 lb)   Height: 152.4 cm (60\")     Body mass index is 55.86 kg/m².    Vitals reviewed.   Constitutional:       General: Not in acute distress.     Appearance: Healthy appearance. Well-developed and not in distress. Not diaphoretic.   HENT:      Head: Normocephalic.   Pulmonary:      Effort: Pulmonary effort is normal. No respiratory distress.      Breath sounds: Normal breath sounds. No wheezing. No rhonchi. No rales.   Cardiovascular:      Normal rate. Irregularly irregular rhythm.      Murmurs: There is no murmur.   Pulses:     Radial: 2+ bilaterally.  Edema:     Peripheral edema absent.   Skin:     General: Skin is warm and dry. There is no cyanosis.      Findings: No rash.   Neurological:      Mental Status: Alert and oriented to person, place, and time.   Psychiatric:         Behavior: Behavior normal. Behavior is cooperative.         Thought Content: Thought content normal.         Judgment: Judgment normal.       Lab Review:     Lab Results   Component Value Date     03/17/2023     12/06/2022    K 4.8 03/17/2023    K 4.5 12/06/2022     03/17/2023     12/06/2022    CO2 30.2 (H) 03/17/2023    CO2 30.6 (H) 12/06/2022    BUN 18 03/17/2023    BUN 17 12/06/2022    CREATININE 1.05 (H) 03/17/2023    CREATININE " "0.88 12/06/2022    EGFRIFNONA 70 09/22/2020    EGFRIFNONA 54 (L) 04/22/2019    EGFRIFAFRI 85 09/22/2020    EGFRIFAFRI 58 (L) 03/27/2019    GLUCOSE 112 (H) 03/17/2023    GLUCOSE 109 (H) 12/06/2022    CALCIUM 9.9 03/17/2023    CALCIUM 9.1 12/06/2022    ALBUMIN 4.5 03/17/2023    ALBUMIN 4.40 12/06/2022    BILITOT 0.5 03/17/2023    BILITOT 0.4 12/06/2022    AST 16 03/17/2023    AST 14 12/06/2022    ALT 16 03/17/2023    ALT 10 12/06/2022     Lab Results   Component Value Date    WBC 9.70 03/17/2023    WBC 12.11 (H) 12/06/2022    HGB 13.9 03/17/2023    HGB 14.2 12/06/2022    HCT 44.8 03/17/2023    HCT 42.7 12/06/2022    MCV 82.7 03/17/2023    MCV 85.4 12/06/2022     03/17/2023     12/06/2022     No results found for: \"PROBNP\", \"BNP\"  No results found for: \"CKTOTAL\", \"CKMB\", \"CKMBINDEX\", \"TROPONINI\", \"TROPONINT\"  Lab Results   Component Value Date    TSH 2.190 05/02/2022    TSH 2.930 09/22/2020             ECG 12 Lead    Date/Time: 4/17/2025 10:40 AM  Performed by: Rafaela Francisco APRN    Authorized by: Rafaela Francisco APRN  Comparison: compared with previous ECG   Similar to previous ECG  Rhythm: atrial fibrillation  Rate: normal  BPM: 83  Conduction: right bundle branch block  QRS axis: normal  Comments: Similar to prior        Assessment:       Diagnosis Plan   1. Atrial fibrillation, persistent  ECG 12 Lead      2. Primary hypertension  ECG 12 Lead      3. Meningioma  ECG 12 Lead      4. Suspected sleep apnea  ECG 12 Lead      5. Dyslipidemia  ECG 12 Lead        Plan:       1.  Chronic atrial fibrillation. Rates are controlled and she is tolerating Eliquis well no falls or abnormal bleeding.  Labs in October 2024 were stable.  2.  Transient cardiomyopathy in the setting of tachycardia.  Ejection fraction on 10/2023 was normal.  Clinically compensated and euvolemic on exam today  3.  Elevated STOP-BANG score.  Sleep study was recommended but patient declined evaluation  4.  Cranial AV fistula. " Recommendations per neurosurgery  5.  Dyslipidemia on statin.  Most recent lipids not at goal.  Managed by PCP  6.  Elevated glucose.  Mild and managed by PCP      Time Spent: I spent 30 minutes caring for Joan on this date of service. This time includes time spent by me in the following activities: preparing for the visit, reviewing tests, performing a medically appropriate examination and/or evaluations, counseling and educating the patient/family/caregiver, ordering medications, tests, or procedures, documenting information in the medical record, and independently interpreting results and communicating that information with the patient/family/caregiver.   I spent 1 minutes on the separately reported service of ECG. This time is not included in the time used to support the E/M service also reported today.        Your medication list            Accurate as of April 17, 2025 10:58 AM. If you have any questions, ask your nurse or doctor.                CONTINUE taking these medications        Instructions Last Dose Given Next Dose Due   apixaban 5 MG tablet tablet  Commonly known as: ELIQUIS      Take 1 tablet by mouth Every 12 (Twelve) Hours.       atorvastatin 10 MG tablet  Commonly known as: LIPITOR      Take 1 tablet by mouth Every Night.       metoprolol succinate XL 25 MG 24 hr tablet  Commonly known as: Toprol XL      Take 1 tablet by mouth Daily.       multivitamin with minerals tablet tablet      Take 1 tablet by mouth Daily.       vitamin E 400 UNIT capsule      Take 1 capsule by mouth Daily. 450 units                Patient is no longer taking -.  I corrected the med list to reflect this.  I did not stop these medications.    Return for Keep October appointment.      Dictated utilizing Dragon dictation

## (undated) DEVICE — NDL HYPO ECLPS SFTY 22G 1 1/2IN

## (undated) DEVICE — CONTAINER,SPECIMEN,OR STERILE,4OZ: Brand: MEDLINE

## (undated) DEVICE — SUT VIC 3/0 TIES 18IN J110T

## (undated) DEVICE — JACKSON-PRATT 100CC BULB RESERVOIR: Brand: CARDINAL HEALTH

## (undated) DEVICE — Device

## (undated) DEVICE — STPLR SKIN VISISTAT WD 35CT

## (undated) DEVICE — SKIN PREP TRAY W/CHG: Brand: MEDLINE INDUSTRIES, INC.

## (undated) DEVICE — GLV SURG BIOGEL LTX PF 6 1/2

## (undated) DEVICE — DISPOSABLE BIPOLAR FORCEPS 4" (10.2CM) JEWELERS, STRAIGHT 0.4MM TIP AND 12 FT. (3.6M) CABLE: Brand: KIRWAN

## (undated) DEVICE — BIOPATCH™ ANTIMICROBIAL DRESSING WITH CHLORHEXIDINE GLUCONATE IS A HYDROPHILLIC POLYURETHANE ABSORPTIVE FOAM WITH CHLORHEXIDINE GLUCONATE (CHG) WHICH INHIBITS BACTERIAL GROWTH UNDER THE DRESSING. THE DRESSING IS INTENDED TO BE USED TO ABSORB EXUDATE, COVER A WOUND CAUSED BY VASCULAR AND NONVASCULAR PERCUTANEOUS MEDICAL DEVICES DURING SURGERY, AS WELL AS REDUCE LOCAL INFECTION AND COLONIZATION OF MICROORGANISMS.: Brand: BIOPATCH

## (undated) DEVICE — TOWEL,OR,DSP,ST,BLUE,STD,4/PK,20PK/CS: Brand: MEDLINE

## (undated) DEVICE — DRAPE,REIN 53X77,STERILE: Brand: MEDLINE

## (undated) DEVICE — IRRIGATOR BULB ASEPTO 60CC STRL

## (undated) DEVICE — SUT VIC 5/0 PS2 18IN J495H

## (undated) DEVICE — DRSNG TELFA PAD NONADH STR 1S 3X4IN

## (undated) DEVICE — SUT VIC 2/0 TIES 18IN J111T

## (undated) DEVICE — PK ENT MAJ 40

## (undated) DEVICE — SUT VIC 3/0 CT2 27IN J232H

## (undated) DEVICE — ST. SORBAVIEW ULTIMATE IJ SYSTEM A,C: Brand: CENTURION

## (undated) DEVICE — ELECTRD BLD EDGE/INSUL1P 2.4X5.1MM STRL

## (undated) DEVICE — MAGNETIC DRAPE: Brand: DEVON

## (undated) DEVICE — ADHS SKIN DERMABOND TOP ADVANCED